# Patient Record
Sex: FEMALE | Race: WHITE | Employment: OTHER | URBAN - NONMETROPOLITAN AREA
[De-identification: names, ages, dates, MRNs, and addresses within clinical notes are randomized per-mention and may not be internally consistent; named-entity substitution may affect disease eponyms.]

---

## 2017-04-14 ENCOUNTER — HOSPITAL ENCOUNTER (OUTPATIENT)
Dept: CT IMAGING | Age: 77
Discharge: HOME | End: 2017-04-14
Payer: MEDICARE

## 2017-04-14 DIAGNOSIS — R10.2 PELVIC AND PERINEAL PAIN: ICD-10-CM

## 2017-04-14 LAB
GFR NON-AFRICAN AMERICAN: 48
PERFORMED ON: ABNORMAL
POC CREATININE: 1.1 MG/DL (ref 0.3–1.3)
POC SAMPLE TYPE: ABNORMAL

## 2017-04-14 PROCEDURE — 6360000004 HC RX CONTRAST MEDICATION: Performed by: FAMILY MEDICINE

## 2017-04-14 PROCEDURE — 74177 CT ABD & PELVIS W/CONTRAST: CPT

## 2017-04-14 PROCEDURE — 82565 ASSAY OF CREATININE: CPT

## 2017-04-14 RX ADMIN — IOVERSOL 75 ML: 741 INJECTION INTRA-ARTERIAL; INTRAVENOUS at 15:28

## 2017-05-02 ENCOUNTER — TRANSCRIBE ORDERS (OUTPATIENT)
Dept: ADMINISTRATIVE | Facility: HOSPITAL | Age: 77
End: 2017-05-02

## 2017-05-02 DIAGNOSIS — R19.09 ABDOMINAL MASS OF OTHER SITE: Primary | ICD-10-CM

## 2017-05-05 ENCOUNTER — APPOINTMENT (OUTPATIENT)
Dept: NUCLEAR MEDICINE | Facility: HOSPITAL | Age: 77
End: 2017-05-05
Attending: FAMILY MEDICINE

## 2017-05-11 ENCOUNTER — HOSPITAL ENCOUNTER (OUTPATIENT)
Dept: NUCLEAR MEDICINE | Facility: HOSPITAL | Age: 77
End: 2017-05-11
Attending: FAMILY MEDICINE

## 2017-05-11 ENCOUNTER — APPOINTMENT (OUTPATIENT)
Dept: NUCLEAR MEDICINE | Facility: HOSPITAL | Age: 77
End: 2017-05-11
Attending: FAMILY MEDICINE

## 2017-05-12 ENCOUNTER — TELEPHONE (OUTPATIENT)
Dept: CARDIOLOGY | Age: 77
End: 2017-05-12

## 2017-05-18 ENCOUNTER — HOSPITAL ENCOUNTER (OUTPATIENT)
Dept: NUCLEAR MEDICINE | Facility: HOSPITAL | Age: 77
Discharge: HOME | End: 2017-05-18
Attending: FAMILY MEDICINE

## 2017-05-18 DIAGNOSIS — R19.09 ABDOMINAL MASS OF OTHER SITE: ICD-10-CM

## 2017-05-18 PROCEDURE — A9561 TC99M OXIDRONATE: HCPCS | Performed by: FAMILY MEDICINE

## 2017-05-18 PROCEDURE — 0 TECHNETIUM OXIDRONATE KIT: Performed by: FAMILY MEDICINE

## 2017-05-18 PROCEDURE — 78306 BONE IMAGING WHOLE BODY: CPT

## 2017-05-18 RX ADMIN — TECHNETIUM TC 99M OXIDRONATE 1 DOSE: 3.15 INJECTION, POWDER, LYOPHILIZED, FOR SOLUTION INTRAVENOUS at 12:00

## 2017-06-23 ENCOUNTER — TELEPHONE (OUTPATIENT)
Dept: CARDIOLOGY | Age: 77
End: 2017-06-23

## 2017-07-12 ENCOUNTER — OFFICE VISIT (OUTPATIENT)
Dept: CARDIOLOGY | Age: 77
End: 2017-07-12
Payer: MEDICARE

## 2017-07-12 VITALS
BODY MASS INDEX: 24.4 KG/M2 | SYSTOLIC BLOOD PRESSURE: 110 MMHG | DIASTOLIC BLOOD PRESSURE: 70 MMHG | WEIGHT: 161 LBS | HEIGHT: 68 IN | HEART RATE: 103 BPM

## 2017-07-12 DIAGNOSIS — I51.89 DIASTOLIC DYSFUNCTION: ICD-10-CM

## 2017-07-12 DIAGNOSIS — Z95.0 PACEMAKER: ICD-10-CM

## 2017-07-12 DIAGNOSIS — I49.5 SICK SINUS SYNDROME (HCC): Primary | ICD-10-CM

## 2017-07-12 PROCEDURE — G8420 CALC BMI NORM PARAMETERS: HCPCS | Performed by: NURSE PRACTITIONER

## 2017-07-12 PROCEDURE — 1090F PRES/ABSN URINE INCON ASSESS: CPT | Performed by: NURSE PRACTITIONER

## 2017-07-12 PROCEDURE — G8427 DOCREV CUR MEDS BY ELIG CLIN: HCPCS | Performed by: NURSE PRACTITIONER

## 2017-07-12 PROCEDURE — 4040F PNEUMOC VAC/ADMIN/RCVD: CPT | Performed by: NURSE PRACTITIONER

## 2017-07-12 PROCEDURE — 1123F ACP DISCUSS/DSCN MKR DOCD: CPT | Performed by: NURSE PRACTITIONER

## 2017-07-12 PROCEDURE — G8400 PT W/DXA NO RESULTS DOC: HCPCS | Performed by: NURSE PRACTITIONER

## 2017-07-12 PROCEDURE — 99203 OFFICE O/P NEW LOW 30 MIN: CPT | Performed by: NURSE PRACTITIONER

## 2017-07-12 PROCEDURE — 93280 PM DEVICE PROGR EVAL DUAL: CPT | Performed by: NURSE PRACTITIONER

## 2017-07-12 PROCEDURE — 1036F TOBACCO NON-USER: CPT | Performed by: NURSE PRACTITIONER

## 2017-07-12 RX ORDER — FUROSEMIDE 40 MG/1
40 TABLET ORAL 2 TIMES DAILY
COMMUNITY
Start: 2017-07-04

## 2017-07-12 RX ORDER — CITALOPRAM 40 MG/1
40 TABLET ORAL DAILY
COMMUNITY
Start: 2017-06-17

## 2017-07-13 ENCOUNTER — TELEPHONE (OUTPATIENT)
Dept: CARDIOLOGY | Age: 77
End: 2017-07-13

## 2017-10-12 ENCOUNTER — TELEPHONE (OUTPATIENT)
Dept: CARDIOLOGY | Age: 77
End: 2017-10-12

## 2017-10-12 ENCOUNTER — HOSPITAL ENCOUNTER (OUTPATIENT)
Dept: WOUND CARE | Age: 77
Discharge: HOME | End: 2017-10-12
Payer: MEDICARE

## 2017-10-12 VITALS
HEART RATE: 79 BPM | DIASTOLIC BLOOD PRESSURE: 55 MMHG | TEMPERATURE: 98.2 F | BODY MASS INDEX: 23.19 KG/M2 | WEIGHT: 153 LBS | RESPIRATION RATE: 18 BRPM | HEIGHT: 68 IN | SYSTOLIC BLOOD PRESSURE: 103 MMHG

## 2017-10-12 DIAGNOSIS — R15.9 INCONTINENCE OF FECES, UNSPECIFIED FECAL INCONTINENCE TYPE: Chronic | ICD-10-CM

## 2017-10-12 DIAGNOSIS — L89.154 DECUBITUS ULCER OF SACRAL REGION, STAGE 4 (HCC): Chronic | ICD-10-CM

## 2017-10-12 DIAGNOSIS — E46 MALNUTRITION (HCC): Chronic | ICD-10-CM

## 2017-10-12 DIAGNOSIS — R53.81 DEBILITATED: Chronic | ICD-10-CM

## 2017-10-12 DIAGNOSIS — Z87.828 H/O BACK INJURY: Chronic | ICD-10-CM

## 2017-10-12 PROCEDURE — 11043 DBRDMT MUSC&/FSCA 1ST 20/<: CPT | Performed by: SURGERY

## 2017-10-12 PROCEDURE — 99214 OFFICE O/P EST MOD 30 MIN: CPT

## 2017-10-12 PROCEDURE — 11042 DBRDMT SUBQ TIS 1ST 20SQCM/<: CPT

## 2017-10-12 PROCEDURE — 99203 OFFICE O/P NEW LOW 30 MIN: CPT | Performed by: SURGERY

## 2017-10-12 NOTE — TELEPHONE ENCOUNTER
Talked to nurse at [facility]. Reminded her to send Carelink. She does have the monitor.
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
## 2017-10-12 NOTE — PLAN OF CARE
Problem: Pain:  Goal: Pain level will decrease  Pain level will decrease   Outcome: Ongoing    Goal: Control of acute pain  Control of acute pain   Outcome: Ongoing    Goal: Control of chronic pain  Control of chronic pain   Outcome: Ongoing      Problem: Wound:  Goal: Will show signs of wound healing; wound closure and no evidence of infection  Will show signs of wound healing; wound closure and no evidence of infection  Outcome: Ongoing      Problem: Pressure Ulcer:  Goal: Signs of wound healing will improve  Signs of wound healing will improve  Outcome: Ongoing    Goal: Absence of new pressure ulcer  Absence of new pressure ulcer  Outcome: Ongoing    Goal: Will show no infection signs and symptoms  Will show no infection signs and symptoms  Outcome: Ongoing

## 2017-10-23 ENCOUNTER — HOSPITAL ENCOUNTER (EMERGENCY)
Age: 77
Discharge: HOME | End: 2017-10-23
Attending: FAMILY MEDICINE
Payer: MEDICARE

## 2017-10-23 VITALS
DIASTOLIC BLOOD PRESSURE: 75 MMHG | TEMPERATURE: 98.5 F | HEIGHT: 68 IN | BODY MASS INDEX: 22.73 KG/M2 | WEIGHT: 150 LBS | OXYGEN SATURATION: 97 % | RESPIRATION RATE: 20 BRPM | HEART RATE: 84 BPM | SYSTOLIC BLOOD PRESSURE: 143 MMHG

## 2017-10-23 DIAGNOSIS — D72.829 LEUKOCYTOSIS, UNSPECIFIED TYPE: ICD-10-CM

## 2017-10-23 DIAGNOSIS — A49.9 UTI (URINARY TRACT INFECTION), BACTERIAL: Primary | ICD-10-CM

## 2017-10-23 DIAGNOSIS — N39.0 UTI (URINARY TRACT INFECTION), BACTERIAL: Primary | ICD-10-CM

## 2017-10-23 LAB
ALBUMIN SERPL-MCNC: 2 G/DL (ref 3.5–5.2)
ALP BLD-CCNC: 140 U/L (ref 35–104)
ALT SERPL-CCNC: 9 U/L (ref 5–33)
ANION GAP SERPL CALCULATED.3IONS-SCNC: 9 MMOL/L (ref 7–19)
AST SERPL-CCNC: 18 U/L (ref 5–32)
BACTERIA: ABNORMAL /HPF
BASOPHILS ABSOLUTE: 0.1 K/UL (ref 0–0.2)
BASOPHILS RELATIVE PERCENT: 0.4 % (ref 0–1)
BILIRUB SERPL-MCNC: 0.3 MG/DL (ref 0.2–1.2)
BILIRUBIN URINE: NEGATIVE
BLOOD, URINE: NEGATIVE
BUN BLDV-MCNC: 9 MG/DL (ref 8–23)
CALCIUM SERPL-MCNC: 8.3 MG/DL (ref 8.8–10.2)
CHLORIDE BLD-SCNC: 87 MMOL/L (ref 98–111)
CLARITY: ABNORMAL
CO2: 36 MMOL/L (ref 22–29)
COLOR: YELLOW
CREAT SERPL-MCNC: 0.4 MG/DL (ref 0.5–0.9)
EOSINOPHILS ABSOLUTE: 0 K/UL (ref 0–0.6)
EOSINOPHILS RELATIVE PERCENT: 0.1 % (ref 0–5)
EPITHELIAL CELLS, UA: 1 /HPF (ref 0–5)
GFR NON-AFRICAN AMERICAN: >60
GLUCOSE BLD-MCNC: 91 MG/DL (ref 74–109)
GLUCOSE URINE: NEGATIVE MG/DL
HCT VFR BLD CALC: 39.1 % (ref 37–47)
HEMOGLOBIN: 12.6 G/DL (ref 12–16)
HYALINE CASTS: 4 /HPF (ref 0–8)
KETONES, URINE: NEGATIVE MG/DL
LEUKOCYTE ESTERASE, URINE: ABNORMAL
LYMPHOCYTES ABSOLUTE: 1.6 K/UL (ref 1.1–4.5)
LYMPHOCYTES RELATIVE PERCENT: 9.6 % (ref 20–40)
MCH RBC QN AUTO: 31.7 PG (ref 27–31)
MCHC RBC AUTO-ENTMCNC: 32.2 G/DL (ref 33–37)
MCV RBC AUTO: 98.2 FL (ref 81–99)
MONOCYTES ABSOLUTE: 1.2 K/UL (ref 0–0.9)
MONOCYTES RELATIVE PERCENT: 7.1 % (ref 0–10)
NEUTROPHILS ABSOLUTE: 12.7 K/UL (ref 1.5–7.5)
NEUTROPHILS RELATIVE PERCENT: 78.3 % (ref 50–65)
NITRITE, URINE: POSITIVE
PDW BLD-RTO: 13 % (ref 11.5–14.5)
PH UA: 6
PLATELET # BLD: 281 K/UL (ref 130–400)
PMV BLD AUTO: 10.1 FL (ref 9.4–12.3)
POTASSIUM SERPL-SCNC: 3.7 MMOL/L (ref 3.5–5)
PROTEIN UA: NEGATIVE MG/DL
RBC # BLD: 3.98 M/UL (ref 4.2–5.4)
RBC UA: 1 /HPF (ref 0–4)
SODIUM BLD-SCNC: 132 MMOL/L (ref 136–145)
SPECIFIC GRAVITY UA: 1.01
TOTAL PROTEIN: 6.1 G/DL (ref 6.6–8.7)
UROBILINOGEN, URINE: 1 E.U./DL
WBC # BLD: 16.3 K/UL (ref 4.8–10.8)
WBC UA: 60 /HPF (ref 0–5)

## 2017-10-23 PROCEDURE — 85025 COMPLETE CBC W/AUTO DIFF WBC: CPT

## 2017-10-23 PROCEDURE — 96365 THER/PROPH/DIAG IV INF INIT: CPT

## 2017-10-23 PROCEDURE — 6370000000 HC RX 637 (ALT 250 FOR IP): Performed by: FAMILY MEDICINE

## 2017-10-23 PROCEDURE — 86403 PARTICLE AGGLUT ANTBDY SCRN: CPT

## 2017-10-23 PROCEDURE — 87086 URINE CULTURE/COLONY COUNT: CPT

## 2017-10-23 PROCEDURE — 36415 COLL VENOUS BLD VENIPUNCTURE: CPT

## 2017-10-23 PROCEDURE — 87075 CULTR BACTERIA EXCEPT BLOOD: CPT

## 2017-10-23 PROCEDURE — 2580000003 HC RX 258: Performed by: FAMILY MEDICINE

## 2017-10-23 PROCEDURE — 99284 EMERGENCY DEPT VISIT MOD MDM: CPT | Performed by: FAMILY MEDICINE

## 2017-10-23 PROCEDURE — 99283 EMERGENCY DEPT VISIT LOW MDM: CPT

## 2017-10-23 PROCEDURE — 87070 CULTURE OTHR SPECIMN AEROBIC: CPT

## 2017-10-23 PROCEDURE — 80053 COMPREHEN METABOLIC PANEL: CPT

## 2017-10-23 PROCEDURE — 2500000003 HC RX 250 WO HCPCS: Performed by: FAMILY MEDICINE

## 2017-10-23 PROCEDURE — 87040 BLOOD CULTURE FOR BACTERIA: CPT

## 2017-10-23 PROCEDURE — 81001 URINALYSIS AUTO W/SCOPE: CPT

## 2017-10-23 PROCEDURE — 87185 SC STD ENZYME DETCJ PER NZM: CPT

## 2017-10-23 PROCEDURE — 87077 CULTURE AEROBIC IDENTIFY: CPT

## 2017-10-23 RX ORDER — BENZONATATE 100 MG/1
100 CAPSULE ORAL 3 TIMES DAILY PRN
COMMUNITY
End: 2018-03-06 | Stop reason: ALTCHOICE

## 2017-10-23 RX ORDER — POTASSIUM CHLORIDE 1.5 G/1.77G
20 POWDER, FOR SOLUTION ORAL DAILY
COMMUNITY
End: 2018-09-25

## 2017-10-23 RX ORDER — MAGNESIUM CARB/ALUMINUM HYDROX 105-160MG
296 TABLET,CHEWABLE ORAL ONCE
COMMUNITY
End: 2018-02-22 | Stop reason: ALTCHOICE

## 2017-10-23 RX ORDER — NYSTATIN 100000 U/G
CREAM TOPICAL 2 TIMES DAILY
COMMUNITY
End: 2017-10-26 | Stop reason: ALTCHOICE

## 2017-10-23 RX ORDER — TRAMADOL HYDROCHLORIDE 50 MG/1
50 TABLET ORAL ONCE
Status: COMPLETED | OUTPATIENT
Start: 2017-10-23 | End: 2017-10-23

## 2017-10-23 RX ORDER — DOXYCYCLINE HYCLATE 100 MG
100 TABLET ORAL 2 TIMES DAILY
Qty: 14 TABLET | Refills: 0 | Status: SHIPPED | OUTPATIENT
Start: 2017-10-23 | End: 2017-10-26 | Stop reason: ALTCHOICE

## 2017-10-23 RX ORDER — CLOTRIMAZOLE AND BETAMETHASONE DIPROPIONATE 10; .64 MG/G; MG/G
CREAM TOPICAL 2 TIMES DAILY
COMMUNITY
End: 2017-10-26 | Stop reason: ALTCHOICE

## 2017-10-23 RX ADMIN — DOXYCYCLINE 100 MG: 100 INJECTION, POWDER, LYOPHILIZED, FOR SOLUTION INTRAVENOUS at 21:02

## 2017-10-23 RX ADMIN — TRAMADOL HYDROCHLORIDE 50 MG: 50 TABLET, FILM COATED ORAL at 22:08

## 2017-10-23 ASSESSMENT — ENCOUNTER SYMPTOMS
ABDOMINAL PAIN: 0
VOMITING: 0
SINUS PAIN: 0
BACK PAIN: 0
CHEST TIGHTNESS: 0
NAUSEA: 0
ABDOMINAL DISTENTION: 0
COUGH: 0
BLOOD IN STOOL: 0
SHORTNESS OF BREATH: 0
COLOR CHANGE: 0
RHINORRHEA: 0
CONSTIPATION: 0
WHEEZING: 0
PHOTOPHOBIA: 0
DIARRHEA: 0

## 2017-10-23 ASSESSMENT — PAIN SCALES - GENERAL
PAINLEVEL_OUTOF10: 3
PAINLEVEL_OUTOF10: 3

## 2017-10-23 NOTE — ED PROVIDER NOTES
as needed for Itching    DOCUSATE SODIUM (COLACE) 100 MG CAPSULE    Take 100 mg by mouth daily    FEXOFENADINE HCL (MUCINEX ALLERGY PO)    Take by mouth    FUROSEMIDE (LASIX) 20 MG TABLET    20 mg 2 times daily     IPRATROPIUM (ATROVENT) 0.03 % NASAL SPRAY    2 sprays by Nasal route every 12 hours    LOPERAMIDE (IMODIUM) 2 MG CAPSULE    Take 2 mg by mouth 4 times daily as needed for Diarrhea    LORATADINE (CLARITIN) 10 MG TABLET    Take 10 mg by mouth daily    LORAZEPAM (ATIVAN) 0.5 MG TABLET    Take 0.25 mg by mouth 2 times daily    MAGNESIUM CITRATE (CITROMA) 1.745 GM/30ML SOLUTION    Take 296 mLs by mouth once    MAGNESIUM HYDROXIDE (MILK OF MAGNESIA PO)    Take by mouth    MELOXICAM (MOBIC) 7.5 MG TABLET    Take 7.5 mg by mouth daily    MENTHOL-ZINC OXIDE (CALMOSEPTINE EX)    Apply topically    MENTHOL-ZINC OXIDE (CALMOSEPTINE EX)    Apply topically    NYSTATIN (MYCOSTATIN) 106763 UNIT/GM CREAM    Apply topically 2 times daily Apply topically 2 times daily. PANTOPRAZOLE SODIUM (PROTONIX) 40 MG PACK PACKET    Take 40 mg by mouth every morning (before breakfast)    POLYETHYLENE GLYCOL (GLYCOLAX) POWDER    Take 17 g by mouth daily    POTASSIUM CHLORIDE (KLOR-CON) 20 MEQ PACKET    Take 20 mEq by mouth daily    QUETIAPINE (SEROQUEL) 100 MG TABLET    Take 50 mg by mouth daily    ROPINIROLE (REQUIP) 0.25 MG TABLET    Take 0.25 mg by mouth daily    SKIN PROTECTANTS, MISC. (MINERIN) CREA    Apply topically    TRAMADOL (ULTRAM) 50 MG TABLET    Take 50 mg by mouth daily    TRAMADOL HCL  MG CP24    Take by mouth    TRAZODONE (DESYREL) 150 MG TABLET    Take 150 mg by mouth nightly    VALACYCLOVIR (VALTREX) 500 MG TABLET    Take 500 mg by mouth 2 times daily       ALLERGIES     Ciprocinonide [fluocinolone]; Codeine phosphate [codeine];  Morphine sulfate [morphine]; and Quinolones    FAMILY HISTORY       Family History   Problem Relation Age of Onset    Heart Disease Mother     Heart Disease Father           SOCIAL MICROSCOPIC URINALYSIS - Abnormal; Notable for the following:     WBC, UA 60 (*)     All other components within normal limits   CULTURE BLOOD #1   CULTURE BLOOD #2   TIP CULTURE   URINE CULTURE         All other labs were within normal range or not returned as of this dictation. EMERGENCY DEPARTMENT COURSE and DIFFERENTIAL DIAGNOSIS/MDM:   Vitals:    Vitals:    10/23/17 1902 10/23/17 1933 10/23/17 2003 10/23/17 2033   BP: 119/67 134/72 (!) 109/58 122/80   Pulse: 72 69 75 81   Resp: 20 18 20 20   Temp:       TempSrc:       SpO2: 93% 95% 94% 95%   Weight:       Height:           MDM  Number of Diagnoses or Management Options  Leukocytosis, unspecified type: new and requires workup  UTI (urinary tract infection), bacterial: new and requires workup     Amount and/or Complexity of Data Reviewed  Clinical lab tests: ordered and reviewed  Decide to obtain previous medical records or to obtain history from someone other than the patient: yes  Review and summarize past medical records: yes  Independent visualization of images, tracings, or specimens: yes    Risk of Complications, Morbidity, and/or Mortality  Presenting problems: moderate  Diagnostic procedures: moderate  Management options: moderate    Patient Progress  Patient progress: improved      Reassessment      CONSULTS:  None    PROCEDURES:  Unless otherwise noted below, none     Procedures    FINAL IMPRESSION      1. UTI (urinary tract infection), bacterial    2.  Leukocytosis, unspecified type          DISPOSITION/PLAN   DISPOSITION Decision to Discharge    PATIENT REFERRED TO:  MD Brit [address]ina [address] [id number]  [phone]    In 2 days  As needed      DISCHARGE MEDICATIONS:  New Prescriptions    DOXYCYCLINE HYCLATE (VIBRA-TABS) 100 MG TABLET    Take 1 tablet by mouth 2 times daily for 7 days          (Please note that portions of this note were completed with a voice recognition program.  Efforts were made to edit the dictations but occasionally words are mis-transcribed.)    Raeann Lucas MD (electronically signed)  Attending Emergency Physician       Wilmer Bartlett MD  10/23/17 2619       Wilmer Bartlett MD  10/23/17 6124

## 2017-10-24 NOTE — ED NOTES
Urine specimen pulled from pt existing obrien catheter.  Urine pulled from port after cleaning it with alcohol     Annemarie Luong RN  10/23/17 1583

## 2017-10-24 NOTE — ED NOTES
Pt with large incontinent bowel movement. Pt cleaned up, wound dressing from coccyx changed. Pt put back on back, pt with another large bowel movement.  Pt cleaned up     Maribell Daniel RN  10/23/17 2056

## 2017-10-26 ENCOUNTER — HOSPITAL ENCOUNTER (OUTPATIENT)
Dept: WOUND CARE | Age: 77
Discharge: HOME | End: 2017-10-26
Payer: MEDICARE

## 2017-10-26 VITALS
BODY MASS INDEX: 22.66 KG/M2 | DIASTOLIC BLOOD PRESSURE: 73 MMHG | HEART RATE: 112 BPM | RESPIRATION RATE: 22 BRPM | WEIGHT: 149.06 LBS | SYSTOLIC BLOOD PRESSURE: 122 MMHG | TEMPERATURE: 98.6 F

## 2017-10-26 LAB
ORGANISM: ABNORMAL
URINE CULTURE, ROUTINE: ABNORMAL

## 2017-10-26 PROCEDURE — 11043 DBRDMT MUSC&/FSCA 1ST 20/<: CPT

## 2017-10-26 PROCEDURE — 11043 DBRDMT MUSC&/FSCA 1ST 20/<: CPT | Performed by: SURGERY

## 2017-10-26 RX ORDER — SULFAMETHOXAZOLE AND TRIMETHOPRIM 800; 160 MG/1; MG/1
1 TABLET ORAL 2 TIMES DAILY
Qty: 10 TABLET | Refills: 0 | Status: SHIPPED | OUTPATIENT
Start: 2017-10-26 | End: 2017-11-05

## 2017-10-26 RX ORDER — GUAIFENESIN 600 MG/1
600 TABLET, EXTENDED RELEASE ORAL 2 TIMES DAILY
COMMUNITY
End: 2019-05-07 | Stop reason: ALTCHOICE

## 2017-10-26 RX ORDER — M-VIT,TX,IRON,MINS/CALC/FOLIC 27MG-0.4MG
1 TABLET ORAL DAILY
COMMUNITY

## 2017-10-26 ASSESSMENT — PAIN DESCRIPTION - DESCRIPTORS: DESCRIPTORS: ACHING;TIGHTNESS

## 2017-10-26 ASSESSMENT — PAIN DESCRIPTION - PROGRESSION: CLINICAL_PROGRESSION: NOT CHANGED

## 2017-10-26 ASSESSMENT — PAIN SCALES - GENERAL: PAINLEVEL_OUTOF10: 2

## 2017-10-26 ASSESSMENT — PAIN DESCRIPTION - LOCATION: LOCATION: LEG;OTHER (COMMENT)

## 2017-10-26 ASSESSMENT — PAIN DESCRIPTION - FREQUENCY: FREQUENCY: INTERMITTENT

## 2017-10-26 ASSESSMENT — PAIN DESCRIPTION - ONSET: ONSET: ON-GOING

## 2017-10-26 ASSESSMENT — PAIN DESCRIPTION - PAIN TYPE: TYPE: CHRONIC PAIN

## 2017-10-27 LAB
ANAEROBIC CULTURE: ABNORMAL
CULTURE CATHETER TIP: ABNORMAL
CULTURE CATHETER TIP: ABNORMAL
ORGANISM: ABNORMAL

## 2017-10-28 LAB
BLOOD CULTURE, ROUTINE: NORMAL
CULTURE, BLOOD 2: NORMAL

## 2017-10-31 ENCOUNTER — HOSPITAL ENCOUNTER (OUTPATIENT)
Dept: WOUND CARE | Age: 77
Discharge: HOME | End: 2017-10-31
Payer: OTHER MISCELLANEOUS

## 2017-10-31 VITALS — RESPIRATION RATE: 20 BRPM | WEIGHT: 149 LBS | TEMPERATURE: 97.7 F | HEIGHT: 68 IN | BODY MASS INDEX: 22.58 KG/M2

## 2017-10-31 PROCEDURE — 11043 DBRDMT MUSC&/FSCA 1ST 20/<: CPT

## 2017-10-31 PROCEDURE — 11043 DBRDMT MUSC&/FSCA 1ST 20/<: CPT | Performed by: SURGERY

## 2017-10-31 PROCEDURE — 11042 DBRDMT SUBQ TIS 1ST 20SQCM/<: CPT

## 2017-10-31 ASSESSMENT — PAIN SCALES - GENERAL: PAINLEVEL_OUTOF10: 3

## 2017-10-31 ASSESSMENT — PAIN DESCRIPTION - FREQUENCY: FREQUENCY: INTERMITTENT

## 2017-10-31 ASSESSMENT — PAIN DESCRIPTION - PROGRESSION: CLINICAL_PROGRESSION: NOT CHANGED

## 2017-10-31 ASSESSMENT — PAIN DESCRIPTION - PAIN TYPE: TYPE: CHRONIC PAIN

## 2017-10-31 ASSESSMENT — PAIN DESCRIPTION - LOCATION: LOCATION: BUTTOCKS

## 2017-10-31 ASSESSMENT — PAIN DESCRIPTION - ORIENTATION: ORIENTATION: RIGHT

## 2017-10-31 ASSESSMENT — PAIN DESCRIPTION - ONSET: ONSET: ON-GOING

## 2017-10-31 ASSESSMENT — PAIN DESCRIPTION - DESCRIPTORS: DESCRIPTORS: ACHING

## 2017-10-31 NOTE — PROGRESS NOTES
Wound Care Center   Progress Note and Procedure Note      Cristina Carrillo  AGE: 65 y.o. GENDER: female  : 1940  TODAY'S DATE:  10/31/2017    Subjective:        HISTORY of PRESENT ILLNESS HPI   Cristina Carrillo is a 65 y.o. female who presents today for wound evaluation. Wound Type:pressure  Wound Location:sacral  Modifying factors:chronic pressure, decreased mobility, shear force, malnutrition and incontinence of stool    Patient Active Problem List   Diagnosis Code    Sick sinus syndrome ([facility]) I49.5    Pacemaker W45.1    Diastolic dysfunction C64.8    Decubitus ulcer of sacral region, stage 4 ([facility]) L89.154    Debilitated R53.81    Malnutrition ([facility]) E46    H/O back injury Z87.828    Incontinence of feces R15.9    Severe protein-calorie malnutrition ([facility]) E43       ALLERGIES    Allergies   Allergen Reactions    Ciprocinonide [Fluocinolone]     Codeine Phosphate [Codeine]     Morphine Sulfate [Morphine]     Quinolones            Objective:      Temp 97.7 °F (36.5 °C) (Temporal)   Resp 20   Ht 5' 8\" (1.727 m)   Wt 149 lb (67.6 kg)   BMI 22.66 kg/m²     Post Debridement Measurements and Assessment:  Pressure Ulcer 10/12/17 Coccyx Mid Wound 1. Stage 4, Coccyx (Active)   Sabine-wound Assessment Excoriated 10/31/2017  2:13 PM   Sabine-Wound Texture Excoriation 10/31/2017  2:13 PM   Sabine-Wound Moisture Dark edges 10/31/2017  2:13 PM   Sabine-Wound Color Red 10/31/2017  2:13 PM   Pressure Ulcer Staging Stage IV 10/31/2017  2:13 PM   Non-staged Wound Description Not applicable   8:44 PM   Wound Assessment Black; Yellow;Slough;Pink 10/31/2017  2:13 PM   Shape irregular 10/12/2017  3:24 PM   Wound Length (cm) 6 cm 10/31/2017  3:06 PM   Wound Width (cm) 5 cm 10/31/2017  3:06 PM   Wound Depth (cm)  1.9 10/31/2017  3:06 PM   Calculated Wound Size (cm^2) (l*w) 30 cm^2 10/31/2017  3:06 PM   Change in Wound Size % (l*w) -66.67 10/31/2017  3:06 PM   Closure None 10/31/2017  2:13 PM   Dressing Status Old Debrided:  fibrin, biofilm, slough and necrotic/eschar    Percent of Wound Debrided: 50%- 50% debrided to muscle, 50% debrided as open wound      Bleeding: Estimated amount of blood loss is 15 ml. Hemostasis:   by pressure      Response to treatment:  Well tolerated by patient. Assessment:      Patient Active Problem List   Diagnosis    [facility]    Pacemaker    Diastolic dysfunction    Decubitus ulcer of sacral region, stage 4 ([facility]    Debilitated    Malnutrition ([facility]    H/O back injury    Incontinence of feces    Severe protein-calorie malnutrition ([facility]          Plan:          Plan for wound - Dress per physician order  Treatment:     Compression : No   Offloading : Yes   Dressing : see AVS   Additional Therapy : none     1. Discussed appropriate home care of this wound. Wound redressed. 2. Patient instructions were given. 3. Follow up: 2 week(s). Ms. Arsenio Casillas had more necrotic tissue in the base of her wound and the appearance of continued pressure. I discussed with her that my directions wound be to be up just long enough and then back to bed, no other sitting in her chair, and limited to 30-45 minutes at a time. Discharge Instructions       Visit Discharge/Physician Orders    Discharge condition: Stable     Discharge to: ECF     Left via:Private automobile     Accompanied by:  granddaughter     ECF/HHA: [address]     Dressing Orders: Coccyx:   Wash with soap and water. Apply santyl ointment to wound bed. Cover with normal saline moistened gauze to wound bed. Cover with ABD. Secure with medipore tape or hytape. Change twice daily .     Apply Tinactin to both feet twice daily.      Treatment Orders:   Avoid Pressure to wound site.   Turn frequently (turn at least every two hours when in bed)  While in chair reposition every 30 minutes  Patient advised to sit up less than 1 hours at a time.     Off-load heels by applying heel-lift boots or \"float\" heels by placing pillows under calves so that heels do not touch mattress.     Continue Protein rich diet (unless restricted by your physician). Please restart Protein supplements. Take Multivitamin daily     Please use high profile Roho cushion in chair  Please use low air loss bed          [facility] followup visit ______2 weeks_______________________  (Please note your next appointment above and if you are unable to keep, kindly give a 24 hour notice. Thank you.)        If you experience any of the following, please call the [facility] during business hours:    * Increase in Pain  * Temperature over 101  * Increase in drainage from your wound  * Drainage with a foul odor  * Bleeding  * Increase in swelling  * Need for compression bandage changes due to slippage, breakthrough drainage. If you need medical attention outside of the business hours of the [facility] please contact your PCP or go to the nearest emergency room.              Electronically signed by Velma Ye MD on 10/31/2017 at 3:15 PM

## 2017-10-31 NOTE — PLAN OF CARE
Problem: Wound:  Goal: Will show signs of wound healing; wound closure and no evidence of infection  Will show signs of wound healing; wound closure and no evidence of infection   Outcome: Ongoing      Problem: Pressure Ulcer:  Goal: Signs of wound healing will improve  Signs of wound healing will improve   Outcome: Ongoing    Goal: Absence of new pressure ulcer  Absence of new pressure ulcer   Outcome: Ongoing    Goal: Will show no infection signs and symptoms  Will show no infection signs and symptoms   Outcome: Ongoing

## 2017-11-03 ENCOUNTER — TELEPHONE (OUTPATIENT)
Dept: CARDIOLOGY | Age: 77
End: 2017-11-03

## 2017-11-03 NOTE — TELEPHONE ENCOUNTER
Krys Scanlon of [address] in [facility] called and they have tried 3 times to get carelink to work. It will not send out. Ordered a new carelink monitor and wireless adapter.

## 2017-11-17 DIAGNOSIS — I49.5 SA NODE DYSFUNCTION (HCC): ICD-10-CM

## 2017-11-17 DIAGNOSIS — Z95.0 PACEMAKER: Primary | ICD-10-CM

## 2017-11-17 PROCEDURE — 93294 REM INTERROG EVL PM/LDLS PM: CPT | Performed by: INTERNAL MEDICINE

## 2017-11-17 PROCEDURE — 93296 REM INTERROG EVL PM/IDS: CPT | Performed by: INTERNAL MEDICINE

## 2017-11-21 ENCOUNTER — HOSPITAL ENCOUNTER (OUTPATIENT)
Dept: WOUND CARE | Age: 77
Discharge: HOME | End: 2017-11-21
Payer: MEDICARE

## 2017-11-21 VITALS
RESPIRATION RATE: 16 BRPM | SYSTOLIC BLOOD PRESSURE: 106 MMHG | DIASTOLIC BLOOD PRESSURE: 67 MMHG | WEIGHT: 142.86 LBS | BODY MASS INDEX: 21.65 KG/M2 | TEMPERATURE: 98.2 F | HEART RATE: 87 BPM | HEIGHT: 68 IN

## 2017-11-21 PROCEDURE — 11042 DBRDMT SUBQ TIS 1ST 20SQCM/<: CPT | Performed by: SURGERY

## 2017-11-21 PROCEDURE — 11042 DBRDMT SUBQ TIS 1ST 20SQCM/<: CPT

## 2017-11-21 RX ORDER — COLLAGENASE SANTYL 250 [ARB'U]/G
OINTMENT TOPICAL 2 TIMES DAILY
COMMUNITY
Start: 2017-11-16 | End: 2018-10-16 | Stop reason: ALTCHOICE

## 2017-11-21 ASSESSMENT — PAIN DESCRIPTION - ONSET: ONSET: ON-GOING

## 2017-11-21 ASSESSMENT — PAIN DESCRIPTION - PAIN TYPE: TYPE: CHRONIC PAIN

## 2017-11-21 ASSESSMENT — PAIN DESCRIPTION - FREQUENCY: FREQUENCY: INTERMITTENT

## 2017-11-21 ASSESSMENT — PAIN SCALES - GENERAL: PAINLEVEL_OUTOF10: 2

## 2017-11-21 ASSESSMENT — PAIN DESCRIPTION - LOCATION: LOCATION: HIP

## 2017-11-21 ASSESSMENT — PAIN DESCRIPTION - PROGRESSION: CLINICAL_PROGRESSION: NOT CHANGED

## 2017-11-21 ASSESSMENT — PAIN DESCRIPTION - DESCRIPTORS: DESCRIPTORS: DULL

## 2017-11-21 NOTE — PROGRESS NOTES
Wound Care Center   Progress Note and Procedure Note      Kianna Hollingsworth  AGE: 65 y.o. GENDER: female  : 1940  TODAY'S DATE:  2017    Subjective:        HISTORY of PRESENT ILLNESS HPI   Kianna Hollingsworth is a 65 y.o. female who presents today for wound evaluation. Wound Type:pressure  Wound Location:sacral  Modifying factors:chronic pressure, decreased mobility, shear force and incontinence of stool    Patient Active Problem List   Diagnosis Code    SA node dysfunction ([facility]) I49.5    Pacemaker L64.0    Diastolic dysfunction X47.0    Decubitus ulcer of sacral region, stage 4 ([facility]) L89.154    Debilitated R53.81    Malnutrition ([facility]) E46    H/O back injury Z87.828    Incontinence of feces R15.9    Severe protein-calorie malnutrition ([facility]) E43       ALLERGIES    Allergies   Allergen Reactions    Morphine Sulfate [Morphine] Anaphylaxis     Pt states she quit breathing      Ciprocinonide [Fluocinolone]     Codeine Phosphate [Codeine] Nausea Only    Quinolones            Objective:      /67   Pulse 87   Temp 98.2 °F (36.8 °C) (Temporal)   Resp 16   Ht 5' 8\" (1.727 m)   Wt 142 lb 13.7 oz (64.8 kg)   BMI 21.72 kg/m²     Post Debridement Measurements and Assessment:  Pressure Ulcer 10/12/17 Coccyx Mid Wound 1. Stage 4, Coccyx (Active)   Sabine-wound Assessment Purple;Red 2017 11:06 AM   Sabine-Wound Texture Localized edema 2017 11:06 AM   Sabine-Wound Moisture Dry 2017 11:06 AM   Sabine-Wound Color Purple;Red 2017 11:06 AM   Pressure Ulcer Staging Stage IV 2017 11:06 AM   Non-staged Wound Description Not applicable  91:15 AM   Wound Assessment Drainage;Red;Pink;Slough; Yellow;Black 2017 11:06 AM   Shape irregular 10/12/2017  3:24 PM   Wound Length (cm) 7.5 cm 2017 11:26 AM   Wound Width (cm) 4.5 cm 2017 11:26 AM   Wound Depth (cm)  1.9 2017 11:26 AM   Calculated Wound Size (cm^2) (l*w) 33.75 cm^2 2017 11:26 AM   Change in Wound Size % (l*w) -87.5 11/21/2017 11:26 AM   Closure None 11/21/2017 11:06 AM   Dressing Status Old drainage 11/21/2017 11:06 AM   Dressing Changed Changed/New 10/12/2017  4:45 PM   Dressing/Treatment Moist to dry;ABD; Medipore 10/26/2017  3:26 PM   Wound Cleansed Other (Comment) 11/21/2017 11:06 AM   Necrotic Type Black Eschar 11/21/2017 11:06 AM   Necrotic Amount Medium: 34-66% 11/21/2017 11:06 AM   Granulation Quality Red 11/21/2017 11:06 AM   Drainage Amount Moderate 11/21/2017 11:06 AM   Drainage Description Serosanguinous 11/21/2017 11:06 AM   Odor None 11/21/2017 11:06 AM   Epithelialization None present 11/21/2017 11:06 AM   Distance Tunneling (cm) 0 cm 11/21/2017 11:06 AM   Tunneling Position ___ O'Clock 0 11/21/2017 11:06 AM   Tunneling Maxium Distance (cm) 0 11/21/2017 11:06 AM   Undermining Starts ___ O'Clock 8 11/21/2017 11:06 AM   Undermining Ends___ O'Clock 5 11/21/2017 11:06 AM   Undermining Maxium Distance (cm) 4.8 11/21/2017 11:06 AM   [place]%Wound Bed 20 11/21/2017 11:06 AM   Red%Wound Bed 40 11/21/2017 11:06 AM   Yellow%Wound Bed 20 11/21/2017 11:06 AM   Black%Wound Bed 20 11/21/2017 11:06 AM   Purple%Wound Bed 0 11/21/2017 11:06 AM   Margins Unattached edges 11/21/2017 11:06 AM   Exposed structure Bone 11/21/2017 11:06 AM   Debridement per physician Muscle 11/21/2017 11:26 AM   Time out Yes 11/21/2017 11:26 AM   Procedural Pain 0 11/21/2017 11:26 AM   Post procedural Pain 0 11/21/2017 11:26 AM   Number of days: 39      The patients pain isPain Level: 2 Pain Type: Chronic pain. Wound is has slightly improved. The appearance of the wound is much improved, even though the measurements are not improved. Please refer to nursing measurements and assessment regarding wound pre and post debridement.     Procedure Note:    Wound #: 1     Debridement: Excisional Debridement    Anesthetic: Anesthetic: 2% Lidocaine Gel Topical  Using scissors and forceps the wound was sharply debrided    down through and including the removal of epidermis, dermis and subcutaneous tissue. Devitalized Tissue Debrided:  fibrin, biofilm, slough and necrotic/eschar    Percent of Wound Debrided: 10%      Bleeding: Minimal    Hemostasis:   by pressure      Response to treatment:  Well tolerated by patient. Assessment:      Patient Active Problem List   Diagnosis    [facility] [address]    Pacemaker    Diastolic dysfunction    Decubitus ulcer of sacral region, stage 4 ([facility] [address]    Debilitated    Malnutrition [facility] [address]    H/O back injury    Incontinence of feces    Severe protein-calorie malnutrition ([facility] [address]          Plan:          Plan for wound - Dress per physician order  Treatment:     Compression : No   Offloading : Yes   Dressing : see AVS   Additional Therapy : none     1. Discussed appropriate home care of this wound. Wound redressed. 2. Patient instructions were given. 3. Follow up: 2 week(s). Ms. Nury Fournier wound is looking much more clean, there was only a small area of necrotic tissue today. Continue with spending most time in the bed on the specialty mattress, only up to eat. Encouraged her to try and eat more, and take the protein supplement every time. Follow up in two weeks. Discharge Instructions       Visit Discharge/Physician Orders    Discharge condition: Stable     Discharge to: ECF     Left via:Private automobile     Accompanied by:  granddaughter     ECF/[facility]HA: [address]     Dressing Orders: Coccyx:   Wash with soap and water. Apply santyl ointment to wound bed. Cover with normal saline moistened gauze to wound bed. Cover with ABD. Secure with medipore tape or hytape. Change twice daily .     Apply Tinactin to both feet twice daily.      Treatment Orders:   Avoid Pressure to wound site.   Turn frequently (turn at least every two hours when in bed)  While in chair reposition every 30 minutes  Patient advised to sit up less than 1 hours at a time.     Off-load heels by applying heel-lift boots or \"float\" heels by placing pillows under calves so that heels do not touch mattress.     Continue Protein rich diet (unless restricted by your physician). Please restart Protein supplements. Take Multivitamin daily     Please use high profile Roho cushion in chair  Please use low air loss bed          [facility] followup visit ______2 weeks_______________________  (Please note your next appointment above and if you are unable to keep, kindly give a 24 hour notice. Thank you.)      If you experience any of the following, please call the [facility] during business hours:    * Increase in Pain  * Temperature over 101  * Increase in drainage from your wound  * Drainage with a foul odor  * Bleeding  * Increase in swelling  * Need for compression bandage changes due to slippage, breakthrough drainage. If you need medical attention outside of the business hours of the [facility] please contact your PCP or go to the nearest emergency room.              Electronically signed by Mart Coon MD on 11/21/2017 at 11:33 AM

## 2017-12-12 ENCOUNTER — HOSPITAL ENCOUNTER (OUTPATIENT)
Dept: WOUND CARE | Age: 77
Discharge: HOME | End: 2017-12-12
Payer: MEDICARE

## 2017-12-19 ENCOUNTER — HOSPITAL ENCOUNTER (OUTPATIENT)
Dept: WOUND CARE | Age: 77
Discharge: HOME | End: 2017-12-19
Payer: MEDICARE

## 2017-12-19 VITALS
WEIGHT: 138.45 LBS | DIASTOLIC BLOOD PRESSURE: 56 MMHG | RESPIRATION RATE: 18 BRPM | HEART RATE: 68 BPM | HEIGHT: 68 IN | SYSTOLIC BLOOD PRESSURE: 87 MMHG | BODY MASS INDEX: 20.98 KG/M2

## 2017-12-19 PROCEDURE — 97598 DBRDMT OPN WND ADDL 20CM/<: CPT

## 2017-12-19 PROCEDURE — 97597 DBRDMT OPN WND 1ST 20 CM/<: CPT

## 2017-12-19 PROCEDURE — 11042 DBRDMT SUBQ TIS 1ST 20SQCM/<: CPT | Performed by: SURGERY

## 2017-12-19 PROCEDURE — 11045 DBRDMT SUBQ TISS EACH ADDL: CPT | Performed by: SURGERY

## 2017-12-19 PROCEDURE — 11042 DBRDMT SUBQ TIS 1ST 20SQCM/<: CPT

## 2017-12-19 PROCEDURE — 11045 DBRDMT SUBQ TISS EACH ADDL: CPT

## 2017-12-19 ASSESSMENT — PAIN SCALES - GENERAL: PAINLEVEL_OUTOF10: 0

## 2017-12-19 NOTE — PROGRESS NOTES
Wound Care Center   Progress Note and Procedure Note      Elaine Adrian  AGE: 65 y.o. GENDER: female  : 1940  TODAY'S DATE:  2017    Subjective:        HISTORY of PRESENT ILLNESS HPI   Elaine Adrian is a 65 y.o. female who presents today for wound evaluation. Wound Type:pressure  Wound Location:sacrral  Modifying factors:chronic pressure, decreased mobility, shear force and non-adherence    Patient Active Problem List   Diagnosis Code    SA node dysfunction ([facility]) I49.5    Pacemaker U39.1    Diastolic dysfunction S76.2    Decubitus ulcer of sacral region, stage 4 ([facility]) L89.154    Debilitated R53.81    Malnutrition ([facility]) E46    H/O back injury Z87.828    Incontinence of feces R15.9    Severe protein-calorie malnutrition ([facility]) E43       ALLERGIES    Allergies   Allergen Reactions    Morphine Sulfate [Morphine] Anaphylaxis     Pt states she quit breathing      Ciprocinonide [Fluocinolone]     Codeine Phosphate [Codeine] Nausea Only    Quinolones            Objective:      BP (!) 87/56   Pulse 68   Resp 18   Ht 5' 8\" (1.727 m)   Wt 138 lb 7.2 oz (62.8 kg)   BMI 21.05 kg/m²     Post Debridement Measurements and Assessment:  Pressure Ulcer 10/12/17 Coccyx Mid Wound 1.  Stage 4, Coccyx (Active)   Sabine-wound Assessment Dry 2017 11:25 AM   Sabine-Wound Texture Localized edema 2017 11:06 AM   Sabine-Wound Moisture Dry 2017 11:25 AM   Sabine-Wound Color Purple 2017 11:25 AM   Pressure Ulcer Staging Stage IV 2017 11:25 AM   Non-staged Wound Description Not applicable  38:79 AM   Wound Assessment Red;Slough;Pink;Yellow;Granulation tissue;Drainage 2017 11:25 AM   Shape irregular 10/12/2017  3:24 PM   Wound Length (cm) 9.5 cm 2017 12:00 PM   Wound Width (cm) 5.7 cm 2017 12:00 PM   Wound Depth (cm)  2.7 2017 12:00 PM   Calculated Wound Size (cm^2) (l*w) 54.15 cm^2 2017 12:00 PM   Change in Wound Size % (l*w) -200.83 2017 12:00 PM Closure None 12/19/2017 11:25 AM   Dressing Status Old drainage 12/19/2017 11:25 AM   Dressing Changed Changed/New 10/12/2017  4:45 PM   Dressing/Treatment Moist to dry;ABD; Medipore 10/26/2017  3:26 PM   Wound Cleansed Other (Comment) 12/19/2017 11:25 AM   Necrotic Type Yellow Fibrin/Slough 12/19/2017 11:25 AM   Necrotic Amount Medium: 34-66% 12/19/2017 11:25 AM   Granulation Quality Red 12/19/2017 11:25 AM   Drainage Amount Large 12/19/2017 11:25 AM   Drainage Description Serosanguinous 12/19/2017 11:25 AM   Odor None 12/19/2017 11:25 AM   Epithelialization None present 12/19/2017 11:25 AM   Distance Tunneling (cm) 0 cm 12/19/2017 11:25 AM   Tunneling Position ___ O'Clock 0 12/19/2017 11:25 AM   Tunneling Maxium Distance (cm) 0 12/19/2017 11:25 AM   Undermining Starts ___ O'Clock 12 12/19/2017 11:25 AM   Undermining Ends___ O'Clock 5 12/19/2017 11:25 AM   Undermining Maxium Distance (cm) 4.5 12/19/2017 11:25 AM   [place]%Wound Bed 20 12/19/2017 11:25 AM   Red%Wound Bed 60 12/19/2017 11:25 AM   Yellow%Wound Bed 15 12/19/2017 11:25 AM   Black%Wound Bed 5 12/19/2017 11:25 AM   Purple%Wound Bed 0 12/19/2017 11:25 AM   Margins Unattached edges 12/19/2017 11:25 AM   Exposed structure Bone 12/19/2017 11:25 AM   Debridement per physician Subcutaneous 12/19/2017 12:00 PM   Time out Yes 12/19/2017 12:00 PM   Procedural Pain 0 12/19/2017 12:00 PM   Post procedural Pain 0 12/19/2017 12:00 PM   Number of days: 67      The patients pain isPain Level: 0  . Wound is has worsened slightly. Please refer to nursing measurements and assessment regarding wound pre and post debridement. Procedure Note:    Wound #: 1     Debridement: Excisional Debridement    Anesthetic: Anesthetic: 2% Lidocaine Gel Topical  Using curette the wound was sharply debrided    down through and including the removal of epidermis, dermis and subcutaneous tissue.       Total Surface Area Debrided:  54 sq cm   Devitalized Tissue Debrided:  fibrin, biofilm

## 2018-01-09 ENCOUNTER — HOSPITAL ENCOUNTER (OUTPATIENT)
Dept: WOUND CARE | Age: 78
Discharge: HOME | End: 2018-01-09

## 2018-01-23 ENCOUNTER — HOSPITAL ENCOUNTER (OUTPATIENT)
Dept: WOUND CARE | Age: 78
Discharge: HOME | End: 2018-01-23
Payer: MEDICARE

## 2018-01-23 VITALS
HEART RATE: 64 BPM | TEMPERATURE: 98 F | WEIGHT: 138.2 LBS | HEIGHT: 68 IN | RESPIRATION RATE: 20 BRPM | BODY MASS INDEX: 20.95 KG/M2

## 2018-01-23 DIAGNOSIS — E43 SEVERE PROTEIN-CALORIE MALNUTRITION (HCC): Primary | ICD-10-CM

## 2018-01-23 DIAGNOSIS — L89.154 DECUBITUS ULCER OF SACRAL REGION, STAGE 4 (HCC): Chronic | ICD-10-CM

## 2018-01-23 PROCEDURE — 97598 DBRDMT OPN WND ADDL 20CM/<: CPT | Performed by: SURGERY

## 2018-01-23 PROCEDURE — 97597 DBRDMT OPN WND 1ST 20 CM/<: CPT | Performed by: SURGERY

## 2018-01-23 PROCEDURE — 97598 DBRDMT OPN WND ADDL 20CM/<: CPT

## 2018-01-23 PROCEDURE — 97597 DBRDMT OPN WND 1ST 20 CM/<: CPT

## 2018-01-23 RX ORDER — THERMOMETER, ELECTRONIC,ORAL
EACH MISCELLANEOUS 2 TIMES DAILY
COMMUNITY
End: 2018-03-06 | Stop reason: ALTCHOICE

## 2018-01-23 RX ORDER — OMEPRAZOLE 20 MG/1
20 CAPSULE, DELAYED RELEASE ORAL DAILY
COMMUNITY
End: 2018-03-06 | Stop reason: ALTCHOICE

## 2018-01-23 ASSESSMENT — PAIN DESCRIPTION - DESCRIPTORS: DESCRIPTORS: ACHING

## 2018-01-23 ASSESSMENT — PAIN DESCRIPTION - LOCATION: LOCATION: BUTTOCKS

## 2018-01-23 ASSESSMENT — PAIN DESCRIPTION - ONSET: ONSET: ON-GOING

## 2018-01-23 ASSESSMENT — PAIN DESCRIPTION - FREQUENCY: FREQUENCY: INTERMITTENT

## 2018-01-23 ASSESSMENT — PAIN SCALES - GENERAL: PAINLEVEL_OUTOF10: 5

## 2018-01-23 ASSESSMENT — PAIN DESCRIPTION - PROGRESSION: CLINICAL_PROGRESSION: NOT CHANGED

## 2018-01-23 ASSESSMENT — PAIN DESCRIPTION - PAIN TYPE: TYPE: CHRONIC PAIN

## 2018-01-30 ENCOUNTER — HOSPITAL ENCOUNTER (OUTPATIENT)
Dept: NUCLEAR MEDICINE | Age: 78
Discharge: HOME | End: 2018-02-01
Payer: MEDICARE

## 2018-01-30 DIAGNOSIS — E43 SEVERE PROTEIN-CALORIE MALNUTRITION (HCC): ICD-10-CM

## 2018-01-30 DIAGNOSIS — L89.154 DECUBITUS ULCER OF SACRAL REGION, STAGE 4 (HCC): Chronic | ICD-10-CM

## 2018-01-30 PROCEDURE — A9561 TC99M OXIDRONATE: HCPCS | Performed by: SURGERY

## 2018-01-30 PROCEDURE — 3430000000 HC RX DIAGNOSTIC RADIOPHARMACEUTICAL: Performed by: SURGERY

## 2018-01-30 PROCEDURE — 78315 BONE IMAGING 3 PHASE: CPT

## 2018-01-30 RX ADMIN — TECHNETIUM TC 99M OXIDRONATE 20 MILLICURIE: 3.15 INJECTION, POWDER, LYOPHILIZED, FOR SOLUTION INTRAVENOUS at 13:29

## 2018-02-06 ENCOUNTER — HOSPITAL ENCOUNTER (OUTPATIENT)
Dept: WOUND CARE | Age: 78
Discharge: HOME | End: 2018-02-06
Payer: MEDICARE

## 2018-02-06 VITALS
BODY MASS INDEX: 20.92 KG/M2 | WEIGHT: 138 LBS | RESPIRATION RATE: 20 BRPM | DIASTOLIC BLOOD PRESSURE: 68 MMHG | HEART RATE: 91 BPM | HEIGHT: 68 IN | SYSTOLIC BLOOD PRESSURE: 107 MMHG | TEMPERATURE: 98.7 F

## 2018-02-06 DIAGNOSIS — M86.8X8 OTHER OSTEOMYELITIS, OTHER SITE (HCC): Chronic | ICD-10-CM

## 2018-02-06 DIAGNOSIS — L89.154 DECUBITUS ULCER OF SACRAL REGION, STAGE 4 (HCC): Primary | Chronic | ICD-10-CM

## 2018-02-06 DIAGNOSIS — L89.154 DECUBITUS ULCER OF SACRAL REGION, STAGE 4 (HCC): Chronic | ICD-10-CM

## 2018-02-06 LAB
ALBUMIN SERPL-MCNC: 2.9 G/DL (ref 3.5–5.2)
ANION GAP SERPL CALCULATED.3IONS-SCNC: 14 MMOL/L (ref 7–19)
BUN BLDV-MCNC: 12 MG/DL (ref 8–23)
C-REACTIVE PROTEIN: 4.26 MG/DL (ref 0–0.5)
CALCIUM SERPL-MCNC: 9.3 MG/DL (ref 8.8–10.2)
CHLORIDE BLD-SCNC: 96 MMOL/L (ref 98–111)
CO2: 29 MMOL/L (ref 22–29)
CREAT SERPL-MCNC: <0.5 MG/DL (ref 0.5–0.9)
GFR NON-AFRICAN AMERICAN: >60
GLUCOSE BLD-MCNC: 85 MG/DL (ref 74–109)
HCT VFR BLD CALC: 42.4 % (ref 37–47)
HEMOGLOBIN: 12.7 G/DL (ref 12–16)
MCH RBC QN AUTO: 27.9 PG (ref 27–31)
MCHC RBC AUTO-ENTMCNC: 30 G/DL (ref 33–37)
MCV RBC AUTO: 93 FL (ref 81–99)
PDW BLD-RTO: 14.5 % (ref 11.5–14.5)
PHOSPHORUS: 3.3 MG/DL (ref 2.5–4.5)
PLATELET # BLD: 371 K/UL (ref 130–400)
PMV BLD AUTO: 11.5 FL (ref 9.4–12.3)
POTASSIUM SERPL-SCNC: 4.1 MMOL/L (ref 3.5–5)
PREALBUMIN: 11 MG/DL (ref 20–40)
RBC # BLD: 4.56 M/UL (ref 4.2–5.4)
SEDIMENTATION RATE, ERYTHROCYTE: 82 MM/HR (ref 0–25)
SODIUM BLD-SCNC: 139 MMOL/L (ref 136–145)
WBC # BLD: 9.2 K/UL (ref 4.8–10.8)

## 2018-02-06 PROCEDURE — 36415 COLL VENOUS BLD VENIPUNCTURE: CPT

## 2018-02-06 PROCEDURE — 80069 RENAL FUNCTION PANEL: CPT

## 2018-02-06 PROCEDURE — 84134 ASSAY OF PREALBUMIN: CPT

## 2018-02-06 PROCEDURE — 85652 RBC SED RATE AUTOMATED: CPT

## 2018-02-06 PROCEDURE — 97598 DBRDMT OPN WND ADDL 20CM/<: CPT

## 2018-02-06 PROCEDURE — 97597 DBRDMT OPN WND 1ST 20 CM/<: CPT

## 2018-02-06 PROCEDURE — 97598 DBRDMT OPN WND ADDL 20CM/<: CPT | Performed by: SURGERY

## 2018-02-06 PROCEDURE — 85027 COMPLETE CBC AUTOMATED: CPT

## 2018-02-06 PROCEDURE — 97597 DBRDMT OPN WND 1ST 20 CM/<: CPT | Performed by: SURGERY

## 2018-02-06 PROCEDURE — 86140 C-REACTIVE PROTEIN: CPT

## 2018-02-06 ASSESSMENT — PAIN DESCRIPTION - PAIN TYPE: TYPE: CHRONIC PAIN

## 2018-02-06 ASSESSMENT — PAIN DESCRIPTION - FREQUENCY: FREQUENCY: INTERMITTENT

## 2018-02-06 ASSESSMENT — PAIN DESCRIPTION - PROGRESSION: CLINICAL_PROGRESSION: NOT CHANGED

## 2018-02-06 ASSESSMENT — PAIN DESCRIPTION - ONSET: ONSET: ON-GOING

## 2018-02-06 ASSESSMENT — PAIN SCALES - GENERAL: PAINLEVEL_OUTOF10: 6

## 2018-02-06 ASSESSMENT — PAIN DESCRIPTION - LOCATION: LOCATION: BUTTOCKS;COCCYX

## 2018-02-06 ASSESSMENT — PAIN DESCRIPTION - DESCRIPTORS: DESCRIPTORS: ACHING;SORE;TENDER

## 2018-02-06 NOTE — PROGRESS NOTES
PM   Change in Wound Size % (l*w) -92.67 2/6/2018  2:35 PM   Closure None 2/6/2018  1:57 PM   Dressing Status Old drainage 2/6/2018  1:57 PM   Dressing Changed Changed/New 1/23/2018  3:10 PM   Dressing/Treatment Moist to dry 1/23/2018  3:10 PM   Wound Cleansed Other (Comment) 1/23/2018  1:54 PM   Necrotic Type Yellow Fibrin/Slough 2/6/2018  1:57 PM   Necrotic Amount Medium: 34-66% 2/6/2018  1:57 PM   Granulation Quality Red 2/6/2018  1:57 PM   Drainage Amount Large 2/6/2018  1:57 PM   Drainage Description Serosanguinous 2/6/2018  1:57 PM   Odor None 2/6/2018  1:57 PM   Epithelialization None present 2/6/2018  1:57 PM   Distance Tunneling (cm) 0 cm 2/6/2018  1:57 PM   Tunneling Position ___ O'Clock 0 2/6/2018  1:57 PM   Tunneling Maxium Distance (cm) 0 2/6/2018  1:57 PM   Undermining Starts ___ O'Clock 9 2/6/2018  1:57 PM   Undermining Ends___ O'Clock 12 2/6/2018  1:57 PM   Undermining Maxium Distance (cm) 3.2 2/6/2018  1:57 PM   [place]%Wound Bed 30 2/6/2018  1:57 PM   Red%Wound Bed 50 2/6/2018  1:57 PM   Yellow%Wound Bed 10 2/6/2018  1:57 PM   Black%Wound Bed 0 2/6/2018  1:57 PM   Purple%Wound Bed 0 2/6/2018  1:57 PM   Margins Unattached edges 2/6/2018  1:57 PM   Exposed structure Bone 2/6/2018  1:57 PM   Debridement per physician Partial thickness 2/6/2018  2:35 PM   Time out Yes 2/6/2018  2:35 PM   Procedural Pain 0 2/6/2018  2:35 PM   Post procedural Pain 0 2/6/2018  2:35 PM   Number of days: 117      The patients pain isPain Level: 6 Pain Type: Chronic pain. Wound is has slightly improved. Please refer to nursing measurements and assessment regarding wound pre and post debridement. Procedure Note:    Wound #: 1     Debridement: Non-excisional Debridement    Anesthetic: Anesthetic: 2% Lidocaine Gel Topical  Using curette the wound was sharply debrided    down through and including the removal of epidermis and dermis.       Total Surface Area Debrided:  38 sq cm   Devitalized Tissue Debrided:  fibrin, biofilm

## 2018-02-20 ENCOUNTER — HOSPITAL ENCOUNTER (OUTPATIENT)
Dept: CT IMAGING | Age: 78
Discharge: HOME | End: 2018-02-20
Payer: MEDICARE

## 2018-02-20 ENCOUNTER — OFFICE VISIT (OUTPATIENT)
Dept: CARDIOLOGY | Age: 78
End: 2018-02-20
Payer: MEDICARE

## 2018-02-20 VITALS
DIASTOLIC BLOOD PRESSURE: 58 MMHG | RESPIRATION RATE: 16 BRPM | SYSTOLIC BLOOD PRESSURE: 104 MMHG | WEIGHT: 140 LBS | HEIGHT: 68 IN | BODY MASS INDEX: 21.22 KG/M2

## 2018-02-20 DIAGNOSIS — M86.8X8 OTHER OSTEOMYELITIS, OTHER SITE (HCC): Chronic | ICD-10-CM

## 2018-02-20 DIAGNOSIS — Z95.0 PACEMAKER: ICD-10-CM

## 2018-02-20 DIAGNOSIS — I49.5 SA NODE DYSFUNCTION (HCC): ICD-10-CM

## 2018-02-20 DIAGNOSIS — L89.154 DECUBITUS ULCER OF SACRAL REGION, STAGE 4 (HCC): Chronic | ICD-10-CM

## 2018-02-20 PROCEDURE — G8484 FLU IMMUNIZE NO ADMIN: HCPCS | Performed by: INTERNAL MEDICINE

## 2018-02-20 PROCEDURE — 6360000004 HC RX CONTRAST MEDICATION: Performed by: SURGERY

## 2018-02-20 PROCEDURE — 93280 PM DEVICE PROGR EVAL DUAL: CPT | Performed by: INTERNAL MEDICINE

## 2018-02-20 PROCEDURE — 1090F PRES/ABSN URINE INCON ASSESS: CPT | Performed by: INTERNAL MEDICINE

## 2018-02-20 PROCEDURE — G8420 CALC BMI NORM PARAMETERS: HCPCS | Performed by: INTERNAL MEDICINE

## 2018-02-20 PROCEDURE — 1123F ACP DISCUSS/DSCN MKR DOCD: CPT | Performed by: INTERNAL MEDICINE

## 2018-02-20 PROCEDURE — 1036F TOBACCO NON-USER: CPT | Performed by: INTERNAL MEDICINE

## 2018-02-20 PROCEDURE — 4040F PNEUMOC VAC/ADMIN/RCVD: CPT | Performed by: INTERNAL MEDICINE

## 2018-02-20 PROCEDURE — 99212 OFFICE O/P EST SF 10 MIN: CPT | Performed by: INTERNAL MEDICINE

## 2018-02-20 PROCEDURE — G8400 PT W/DXA NO RESULTS DOC: HCPCS | Performed by: INTERNAL MEDICINE

## 2018-02-20 PROCEDURE — 74177 CT ABD & PELVIS W/CONTRAST: CPT

## 2018-02-20 PROCEDURE — G8427 DOCREV CUR MEDS BY ELIG CLIN: HCPCS | Performed by: INTERNAL MEDICINE

## 2018-02-20 RX ORDER — TRAMADOL HYDROCHLORIDE 300 MG/1
TABLET, EXTENDED RELEASE ORAL
COMMUNITY
Start: 2018-01-29 | End: 2018-02-20

## 2018-02-20 RX ORDER — ROPINIROLE 0.5 MG/1
0.5 TABLET, FILM COATED ORAL NIGHTLY
COMMUNITY
Start: 2018-02-14

## 2018-02-20 RX ORDER — POTASSIUM CHLORIDE 20 MEQ/1
20 TABLET, EXTENDED RELEASE ORAL 2 TIMES DAILY
COMMUNITY
Start: 2018-02-14 | End: 2019-12-12 | Stop reason: ALTCHOICE

## 2018-02-20 RX ADMIN — IOPAMIDOL 90 ML: 755 INJECTION, SOLUTION INTRAVENOUS at 13:31

## 2018-02-20 NOTE — PROGRESS NOTES
Pacemaker interrogated  Presenting rhythm:  AS VS, AP 8.9%,  2.2%  [place] voltage 2.78 V  Lead status:  Lead impedance within range and stable  Sensing:  P waves 2.8 to 4.0 mV,  R waves 8.0 11.2 mV  Thresholds:  Atrial 0.500V @ 0.4ms, ventricular 1.000@ 0.4ms  Observations:  1 mode switch  Reprogramming for sensitivity and threshold testing  Next [facility] appointment:  5/22/18

## 2018-02-22 ENCOUNTER — HOSPITAL ENCOUNTER (OUTPATIENT)
Dept: WOUND CARE | Age: 78
Discharge: HOME | End: 2018-02-22
Payer: MEDICARE

## 2018-02-22 VITALS
HEART RATE: 70 BPM | BODY MASS INDEX: 20.92 KG/M2 | HEIGHT: 68 IN | SYSTOLIC BLOOD PRESSURE: 85 MMHG | WEIGHT: 138 LBS | DIASTOLIC BLOOD PRESSURE: 56 MMHG | RESPIRATION RATE: 18 BRPM | TEMPERATURE: 97.4 F

## 2018-02-22 PROCEDURE — 97597 DBRDMT OPN WND 1ST 20 CM/<: CPT

## 2018-02-22 PROCEDURE — 97598 DBRDMT OPN WND ADDL 20CM/<: CPT

## 2018-02-22 PROCEDURE — 97598 DBRDMT OPN WND ADDL 20CM/<: CPT | Performed by: SURGERY

## 2018-02-22 PROCEDURE — 97597 DBRDMT OPN WND 1ST 20 CM/<: CPT | Performed by: SURGERY

## 2018-02-22 RX ORDER — TRAMADOL HYDROCHLORIDE 300 MG/1
300 CAPSULE ORAL DAILY
COMMUNITY

## 2018-02-22 RX ORDER — NYSTATIN 100000 U/G
CREAM TOPICAL PRN
COMMUNITY
End: 2018-10-16 | Stop reason: ALTCHOICE

## 2018-02-22 RX ORDER — LOPERAMIDE HYDROCHLORIDE 2 MG/1
2 CAPSULE ORAL 4 TIMES DAILY PRN
COMMUNITY
End: 2018-03-06 | Stop reason: ALTCHOICE

## 2018-02-22 RX ORDER — CLOTRIMAZOLE AND BETAMETHASONE DIPROPIONATE 10; .64 MG/G; MG/G
CREAM TOPICAL 2 TIMES DAILY PRN
COMMUNITY
End: 2018-03-06 | Stop reason: ALTCHOICE

## 2018-02-22 ASSESSMENT — PAIN DESCRIPTION - LOCATION: LOCATION: BUTTOCKS;COCCYX

## 2018-02-22 ASSESSMENT — PAIN DESCRIPTION - DESCRIPTORS: DESCRIPTORS: SORE

## 2018-02-22 ASSESSMENT — PAIN DESCRIPTION - PROGRESSION: CLINICAL_PROGRESSION: NOT CHANGED

## 2018-02-22 ASSESSMENT — PAIN DESCRIPTION - PAIN TYPE: TYPE: ACUTE PAIN

## 2018-02-22 ASSESSMENT — PAIN DESCRIPTION - FREQUENCY: FREQUENCY: INTERMITTENT

## 2018-02-22 ASSESSMENT — PAIN DESCRIPTION - ONSET: ONSET: ON-GOING

## 2018-02-22 ASSESSMENT — PAIN SCALES - GENERAL: PAINLEVEL_OUTOF10: 3

## 2018-03-06 ENCOUNTER — HOSPITAL ENCOUNTER (OUTPATIENT)
Dept: WOUND CARE | Age: 78
Discharge: HOME | End: 2018-03-06
Payer: MEDICARE

## 2018-03-06 VITALS
WEIGHT: 149 LBS | BODY MASS INDEX: 22.58 KG/M2 | DIASTOLIC BLOOD PRESSURE: 55 MMHG | SYSTOLIC BLOOD PRESSURE: 85 MMHG | HEIGHT: 68 IN | TEMPERATURE: 98.8 F | RESPIRATION RATE: 18 BRPM | HEART RATE: 77 BPM

## 2018-03-06 PROCEDURE — 97597 DBRDMT OPN WND 1ST 20 CM/<: CPT | Performed by: SURGERY

## 2018-03-06 PROCEDURE — 97598 DBRDMT OPN WND ADDL 20CM/<: CPT

## 2018-03-06 PROCEDURE — 97598 DBRDMT OPN WND ADDL 20CM/<: CPT | Performed by: SURGERY

## 2018-03-06 PROCEDURE — 97597 DBRDMT OPN WND 1ST 20 CM/<: CPT

## 2018-03-06 RX ORDER — OMEPRAZOLE 20 MG/1
20 CAPSULE, DELAYED RELEASE ORAL DAILY
COMMUNITY

## 2018-03-06 RX ORDER — BISACODYL 10 MG
10 SUPPOSITORY, RECTAL RECTAL DAILY PRN
COMMUNITY
End: 2020-01-21 | Stop reason: SDUPTHER

## 2018-03-06 RX ORDER — THERMOMETER, ELECTRONIC,ORAL
EACH MISCELLANEOUS NIGHTLY
COMMUNITY

## 2018-03-06 RX ORDER — BENZONATATE 100 MG/1
100 CAPSULE ORAL 3 TIMES DAILY PRN
COMMUNITY
End: 2018-12-04 | Stop reason: ALTCHOICE

## 2018-03-06 RX ORDER — DIPHENHYDRAMINE HCL 25 MG
25 TABLET ORAL EVERY 6 HOURS PRN
COMMUNITY
End: 2019-10-29 | Stop reason: ALTCHOICE

## 2018-03-06 ASSESSMENT — PAIN SCALES - GENERAL: PAINLEVEL_OUTOF10: 0

## 2018-03-06 NOTE — PROGRESS NOTES
1:57 PM   Change in Wound Size % (l*w) -82 3/6/2018  1:57 PM   Closure None 3/6/2018  1:57 PM   Dressing Status Old drainage 3/6/2018  1:57 PM   Dressing Changed Changed/New 2/22/2018  3:15 PM   Dressing/Treatment Moist to moist;4x4;ABD; Medipore 2/22/2018  3:15 PM   Wound Cleansed Other (Comment) 3/6/2018  1:57 PM   Necrotic Type Yellow Fibrin/Slough 3/6/2018  1:57 PM   Necrotic Amount Small: 1-33% 3/6/2018  1:57 PM   Granulation Quality Red 3/6/2018  1:57 PM   Drainage Amount Large 3/6/2018  1:57 PM   Drainage Description Serosanguinous 3/6/2018  1:57 PM   Odor None 3/6/2018  1:57 PM   Epithelialization None present 3/6/2018  1:57 PM   Distance Tunneling (cm) 0 cm 3/6/2018  1:57 PM   Tunneling Position ___ O'Clock 0 3/6/2018  1:57 PM   Tunneling Maxium Distance (cm) 0 3/6/2018  1:57 PM   Undermining Starts ___ O'Clock 1 3/6/2018  1:57 PM   Undermining Ends___ O'Clock 5 3/6/2018  1:57 PM   Undermining Maxium Distance (cm) 4.5 3/6/2018  1:57 PM   [place]%Wound Bed 10 3/6/2018  1:57 PM   Red%Wound Bed 90 3/6/2018  1:57 PM   Yellow%Wound Bed 10 3/6/2018  1:57 PM   Black%Wound Bed 0 3/6/2018  1:57 PM   Purple%Wound Bed 0 3/6/2018  1:57 PM   Margins Unattached edges 3/6/2018  1:57 PM   Exposed structure Muscle 3/6/2018  1:57 PM   Debridement per physician Partial thickness 2/22/2018  2:34 PM   Time out Yes 2/22/2018  2:34 PM   Procedural Pain 0 2/22/2018  2:34 PM   Post procedural Pain 0 2/22/2018  2:34 PM   Number of days: 145      The patients pain isPain Level: 0  . Wound is unchanged. Please refer to nursing measurements and assessment regarding wound pre and post debridement. Procedure Note:    Wound #: 1     Debridement: Non-excisional Debridement    Anesthetic:    Using curette the wound was sharply debrided    down through and including the removal of epidermis and dermis.       Total Surface Area Debrided:  32 sq cm   Devitalized Tissue Debrided:  fibrin, biofilm and slough    Percent of Wound Debrided:

## 2018-03-20 ENCOUNTER — HOSPITAL ENCOUNTER (OUTPATIENT)
Dept: WOUND CARE | Age: 78
Discharge: HOME | End: 2018-03-20

## 2018-04-05 ENCOUNTER — HOSPITAL ENCOUNTER (OUTPATIENT)
Dept: WOUND CARE | Age: 78
Discharge: HOME | End: 2018-04-05
Payer: MEDICARE

## 2018-04-05 VITALS
SYSTOLIC BLOOD PRESSURE: 98 MMHG | HEART RATE: 73 BPM | DIASTOLIC BLOOD PRESSURE: 59 MMHG | TEMPERATURE: 98.9 F | RESPIRATION RATE: 16 BRPM

## 2018-04-05 PROCEDURE — 11042 DBRDMT SUBQ TIS 1ST 20SQCM/<: CPT

## 2018-04-05 PROCEDURE — 11042 DBRDMT SUBQ TIS 1ST 20SQCM/<: CPT | Performed by: SURGERY

## 2018-04-05 PROCEDURE — 11045 DBRDMT SUBQ TISS EACH ADDL: CPT | Performed by: SURGERY

## 2018-04-05 PROCEDURE — 11045 DBRDMT SUBQ TISS EACH ADDL: CPT

## 2018-04-05 ASSESSMENT — PAIN DESCRIPTION - ONSET: ONSET: ON-GOING

## 2018-04-05 ASSESSMENT — PAIN DESCRIPTION - FREQUENCY: FREQUENCY: INTERMITTENT

## 2018-04-05 ASSESSMENT — PAIN DESCRIPTION - DESCRIPTORS: DESCRIPTORS: ACHING

## 2018-04-05 ASSESSMENT — PAIN SCALES - GENERAL: PAINLEVEL_OUTOF10: 1

## 2018-04-05 ASSESSMENT — PAIN DESCRIPTION - PROGRESSION: CLINICAL_PROGRESSION: NOT CHANGED

## 2018-04-05 ASSESSMENT — PAIN DESCRIPTION - PAIN TYPE: TYPE: ACUTE PAIN

## 2018-04-05 ASSESSMENT — PAIN DESCRIPTION - LOCATION: LOCATION: BUTTOCKS

## 2018-04-19 ENCOUNTER — HOSPITAL ENCOUNTER (OUTPATIENT)
Dept: WOUND CARE | Age: 78
Discharge: HOME | End: 2018-04-19
Payer: MEDICARE

## 2018-04-19 VITALS
DIASTOLIC BLOOD PRESSURE: 69 MMHG | WEIGHT: 149 LBS | HEIGHT: 68 IN | RESPIRATION RATE: 16 BRPM | TEMPERATURE: 98 F | HEART RATE: 80 BPM | SYSTOLIC BLOOD PRESSURE: 106 MMHG | BODY MASS INDEX: 22.58 KG/M2

## 2018-04-19 PROCEDURE — 97597 DBRDMT OPN WND 1ST 20 CM/<: CPT

## 2018-04-19 PROCEDURE — 97597 DBRDMT OPN WND 1ST 20 CM/<: CPT | Performed by: SURGERY

## 2018-04-19 PROCEDURE — 97598 DBRDMT OPN WND ADDL 20CM/<: CPT | Performed by: SURGERY

## 2018-04-19 PROCEDURE — 97598 DBRDMT OPN WND ADDL 20CM/<: CPT

## 2018-04-19 ASSESSMENT — PAIN DESCRIPTION - DESCRIPTORS: DESCRIPTORS: ACHING;SORE;TENDER

## 2018-04-19 ASSESSMENT — PAIN DESCRIPTION - PROGRESSION: CLINICAL_PROGRESSION: NOT CHANGED

## 2018-04-19 ASSESSMENT — PAIN DESCRIPTION - ONSET: ONSET: ON-GOING

## 2018-04-19 ASSESSMENT — PAIN DESCRIPTION - ORIENTATION: ORIENTATION: RIGHT

## 2018-04-19 ASSESSMENT — PAIN SCALES - GENERAL: PAINLEVEL_OUTOF10: 0

## 2018-04-19 ASSESSMENT — PAIN DESCRIPTION - FREQUENCY: FREQUENCY: INTERMITTENT

## 2018-04-19 ASSESSMENT — PAIN DESCRIPTION - PAIN TYPE: TYPE: ACUTE PAIN

## 2018-04-19 ASSESSMENT — PAIN DESCRIPTION - LOCATION: LOCATION: BUTTOCKS

## 2018-05-10 ENCOUNTER — HOSPITAL ENCOUNTER (OUTPATIENT)
Dept: WOUND CARE | Age: 78
Discharge: HOME | End: 2018-05-10
Payer: MEDICARE

## 2018-05-10 VITALS
WEIGHT: 149 LBS | RESPIRATION RATE: 16 BRPM | SYSTOLIC BLOOD PRESSURE: 115 MMHG | TEMPERATURE: 97.5 F | HEIGHT: 68 IN | HEART RATE: 77 BPM | DIASTOLIC BLOOD PRESSURE: 65 MMHG | BODY MASS INDEX: 22.58 KG/M2

## 2018-05-10 PROCEDURE — 97597 DBRDMT OPN WND 1ST 20 CM/<: CPT | Performed by: SURGERY

## 2018-05-10 PROCEDURE — 97597 DBRDMT OPN WND 1ST 20 CM/<: CPT

## 2018-05-10 ASSESSMENT — PAIN DESCRIPTION - FREQUENCY: FREQUENCY: INTERMITTENT

## 2018-05-10 ASSESSMENT — PAIN DESCRIPTION - ORIENTATION: ORIENTATION: RIGHT

## 2018-05-10 ASSESSMENT — PAIN SCALES - GENERAL: PAINLEVEL_OUTOF10: 4

## 2018-05-10 ASSESSMENT — PAIN DESCRIPTION - PAIN TYPE: TYPE: ACUTE PAIN

## 2018-05-10 ASSESSMENT — PAIN DESCRIPTION - DESCRIPTORS: DESCRIPTORS: ACHING;TENDER;SORE

## 2018-05-10 ASSESSMENT — PAIN DESCRIPTION - LOCATION: LOCATION: BUTTOCKS

## 2018-05-10 ASSESSMENT — PAIN DESCRIPTION - ONSET: ONSET: ON-GOING

## 2018-05-10 ASSESSMENT — PAIN DESCRIPTION - PROGRESSION: CLINICAL_PROGRESSION: NOT CHANGED

## 2018-05-23 ENCOUNTER — TELEPHONE (OUTPATIENT)
Dept: CARDIOLOGY | Age: 78
End: 2018-05-23

## 2018-05-24 DIAGNOSIS — Z95.0 PACEMAKER: Primary | ICD-10-CM

## 2018-05-24 DIAGNOSIS — I49.5 SA NODE DYSFUNCTION (HCC): ICD-10-CM

## 2018-05-24 PROCEDURE — 93294 REM INTERROG EVL PM/LDLS PM: CPT | Performed by: CLINICAL NURSE SPECIALIST

## 2018-05-24 PROCEDURE — 93296 REM INTERROG EVL PM/IDS: CPT | Performed by: CLINICAL NURSE SPECIALIST

## 2018-06-05 ENCOUNTER — HOSPITAL ENCOUNTER (OUTPATIENT)
Dept: WOUND CARE | Age: 78
Discharge: HOME | End: 2018-06-05
Payer: MEDICARE

## 2018-06-05 VITALS
WEIGHT: 141.5 LBS | RESPIRATION RATE: 18 BRPM | HEART RATE: 71 BPM | TEMPERATURE: 97.8 F | BODY MASS INDEX: 21.44 KG/M2 | DIASTOLIC BLOOD PRESSURE: 58 MMHG | HEIGHT: 68 IN | SYSTOLIC BLOOD PRESSURE: 93 MMHG

## 2018-06-05 PROCEDURE — 97597 DBRDMT OPN WND 1ST 20 CM/<: CPT | Performed by: SURGERY

## 2018-06-05 PROCEDURE — 97597 DBRDMT OPN WND 1ST 20 CM/<: CPT

## 2018-06-05 ASSESSMENT — PAIN SCALES - GENERAL: PAINLEVEL_OUTOF10: 0

## 2018-06-26 ENCOUNTER — HOSPITAL ENCOUNTER (OUTPATIENT)
Dept: WOUND CARE | Age: 78
Discharge: HOME | End: 2018-06-26
Payer: MEDICARE

## 2018-06-26 VITALS
HEIGHT: 68 IN | RESPIRATION RATE: 16 BRPM | HEART RATE: 65 BPM | TEMPERATURE: 98.6 F | WEIGHT: 143.74 LBS | BODY MASS INDEX: 21.78 KG/M2

## 2018-06-26 PROCEDURE — 97597 DBRDMT OPN WND 1ST 20 CM/<: CPT | Performed by: SURGERY

## 2018-06-26 PROCEDURE — 97597 DBRDMT OPN WND 1ST 20 CM/<: CPT

## 2018-06-26 ASSESSMENT — PAIN SCALES - GENERAL: PAINLEVEL_OUTOF10: 0

## 2018-07-17 ENCOUNTER — HOSPITAL ENCOUNTER (OUTPATIENT)
Dept: WOUND CARE | Age: 78
Discharge: HOME | End: 2018-07-17
Payer: MEDICARE

## 2018-07-17 VITALS
TEMPERATURE: 98.2 F | RESPIRATION RATE: 18 BRPM | WEIGHT: 143 LBS | BODY MASS INDEX: 21.67 KG/M2 | HEART RATE: 72 BPM | HEIGHT: 68 IN

## 2018-07-17 PROCEDURE — 97597 DBRDMT OPN WND 1ST 20 CM/<: CPT | Performed by: SURGERY

## 2018-07-17 PROCEDURE — 97597 DBRDMT OPN WND 1ST 20 CM/<: CPT

## 2018-07-17 ASSESSMENT — PAIN SCALES - GENERAL: PAINLEVEL_OUTOF10: 0

## 2018-07-17 ASSESSMENT — PAIN DESCRIPTION - ONSET: ONSET: ON-GOING

## 2018-07-17 ASSESSMENT — PAIN DESCRIPTION - FREQUENCY: FREQUENCY: INTERMITTENT

## 2018-07-17 ASSESSMENT — PAIN DESCRIPTION - PROGRESSION: CLINICAL_PROGRESSION: NOT CHANGED

## 2018-07-17 ASSESSMENT — PAIN DESCRIPTION - LOCATION: LOCATION: COCCYX

## 2018-07-17 ASSESSMENT — PAIN DESCRIPTION - PAIN TYPE: TYPE: ACUTE PAIN

## 2018-07-17 NOTE — PROGRESS NOTES
Wound Care Center   Progress Note and Procedure Note      Velma Yuen  AGE: 77 y.o. GENDER: female  : 1940  TODAY'S DATE:  2018    Subjective:        HISTORY of PRESENT ILLNESS HPI   Velma Yeun is a 77 y.o. female who presents today for wound evaluation. Wound Type:pressure  Wound Location:sacral  Modifying factors:chronic pressure, decreased mobility, shear force and malnutrition    Patient Active Problem List   Diagnosis Code    SA node dysfunction ([facility]) I49.5    Pacemaker Z28.0    Diastolic dysfunction A94.7    Decubitus ulcer of sacral region, stage 4 ([facility]) L89.154    Debilitated R53.81    Malnutrition ([address] E46    H/O back injury Z87.828    Incontinence of feces R15.9    Severe protein-calorie malnutrition ([facility]) E43    Other osteomyelitis, other site ([facility]) M86.8X8       ALLERGIES    Allergies   Allergen Reactions    Morphine Sulfate [Morphine] Anaphylaxis     Pt states she quit breathing      Ciprocinonide [Fluocinolone]     Codeine Phosphate [Codeine] Nausea Only    Quinolones            Objective:      Pulse 72   Temp 98.2 °F (36.8 °C) (Temporal)   Resp 18   Ht 5' 8\" (1.727 m)   Wt 143 lb (64.9 kg)   BMI 21.74 kg/m²     Post Debridement Measurements and Assessment:  Pressure Ulcer 10/12/17 Coccyx Mid Wound 1: Coccyx (Pressure Stage 4) (Active)   Sabine-wound Assessment Dry; Intact 2018 10:25 AM   Sabine-Wound Texture Rash 5/10/2018  2:46 PM   Sabine-Wound Moisture Dry; Intact 2018 10:25 AM   Sabine-Wound Color Rubor 2018 10:25 AM   Pressure Ulcer Staging Stage IV 2018 10:25 AM   Non-staged Wound Description Not applicable 1756 50:35 AM   Wound Assessment Red;Pink;Slough; Yellow;Drainage;Granulation tissue 2018 10:25 AM   Shape irregular 10/12/2017  3:24 PM   Wound Length (cm) 3.6 cm 2018 10:35 AM   Wound Width (cm) 3 cm 2018 10:35 AM   Wound Depth (cm)  3.3 2018 10:35 AM   Calculated Wound Size (cm^2) (l*w) 10.8 cm^2 2018 10:35 AM

## 2018-07-27 ENCOUNTER — LAB REQUISITION (OUTPATIENT)
Dept: LAB | Facility: HOSPITAL | Age: 78
End: 2018-07-27

## 2018-07-27 DIAGNOSIS — Z00.00 ROUTINE GENERAL MEDICAL EXAMINATION AT A HEALTH CARE FACILITY: ICD-10-CM

## 2018-07-27 LAB
BUN BLD-MCNC: 19 MG/DL (ref 5–21)
CREAT BLD-MCNC: 0.55 MG/DL (ref 0.5–1.4)
GFR SERPL CREATININE-BSD FRML MDRD: 107 ML/MIN/1.73

## 2018-07-27 PROCEDURE — 82565 ASSAY OF CREATININE: CPT

## 2018-07-27 PROCEDURE — 84520 ASSAY OF UREA NITROGEN: CPT

## 2018-07-31 ENCOUNTER — HOSPITAL ENCOUNTER (OUTPATIENT)
Dept: WOUND CARE | Age: 78
Discharge: HOME | End: 2018-07-31

## 2018-08-07 ENCOUNTER — HOSPITAL ENCOUNTER (OUTPATIENT)
Dept: WOUND CARE | Age: 78
Discharge: HOME | End: 2018-08-07
Payer: MEDICARE

## 2018-08-07 VITALS
HEIGHT: 68 IN | DIASTOLIC BLOOD PRESSURE: 59 MMHG | WEIGHT: 136 LBS | TEMPERATURE: 98.6 F | HEART RATE: 69 BPM | SYSTOLIC BLOOD PRESSURE: 98 MMHG | RESPIRATION RATE: 16 BRPM | BODY MASS INDEX: 20.61 KG/M2

## 2018-08-07 PROCEDURE — 97597 DBRDMT OPN WND 1ST 20 CM/<: CPT

## 2018-08-07 PROCEDURE — 97597 DBRDMT OPN WND 1ST 20 CM/<: CPT | Performed by: NURSE PRACTITIONER

## 2018-08-07 RX ORDER — CEFDINIR 300 MG/1
300 CAPSULE ORAL 2 TIMES DAILY
COMMUNITY
End: 2018-10-16 | Stop reason: ALTCHOICE

## 2018-08-07 RX ORDER — QUETIAPINE FUMARATE 50 MG/1
50 TABLET, FILM COATED ORAL NIGHTLY
COMMUNITY
End: 2018-10-16 | Stop reason: ALTCHOICE

## 2018-08-07 ASSESSMENT — PAIN SCALES - GENERAL: PAINLEVEL_OUTOF10: 0

## 2018-08-07 NOTE — PROGRESS NOTES
NONFORMULARY 3 times daily House Supplement on snack cart      diphenhydrAMINE (BENADRYL) 25 MG tablet Take 112.4 mg by mouth every 6 hours as needed for Itching      benzonatate (TESSALON PERLES) 100 MG capsule Take 100 mg by mouth 3 times daily as needed for Cough      magnesium citrate solution Take 296 mLs by mouth once as needed for Constipation      bisacodyl (DULCOLAX) 5 MG EC tablet Take 5 mg by mouth nightly as needed for Constipation      magnesium hydroxide (MILK OF MAGNESIA) 400 MG/5ML suspension Take by mouth daily as needed for Constipation      tolnaftate (TINACTIN) 1 % cream Apply topically 2 times daily Apply topically 2 times daily.  Amino Acids-Protein Hydrolys (PRO-STAT PO) Take 30 mLs by mouth daily      nystatin (MYCOSTATIN) 567775 UNIT/GM cream Apply topically as needed for Dry Skin Apply topically 2 times daily.  traMADol (ULTRAM ER) 300 MG extended releae tablet Take 300 mg by mouth daily.       sodium hypochlorite (DAKINS) 0.125 % SOLN Irrigate with as directed 2 times daily as needed for Wound Care      potassium chloride (KLOR-CON M) 20 MEQ extended release tablet Take 20 mEq by mouth daily       rOPINIRole (REQUIP) 0.5 MG tablet Take 0.5 mg by mouth nightly       SANTYL 250 UNIT/GM ointment Apply topically 2 times daily      guaiFENesin (MUCINEX) 600 MG extended release tablet Take 1,200 mg by mouth 2 times daily      Multiple Vitamins-Minerals (THERAPEUTIC MULTIVITAMIN-MINERALS) tablet Take 1 tablet by mouth daily      potassium chloride (KLOR-CON) 20 MEQ packet Take 20 mEq by mouth daily      furosemide (LASIX) 20 MG tablet 20 mg 2 times daily       citalopram (CELEXA) 40 MG tablet Take 40 mg by mouth nightly       traMADol (ULTRAM) 50 MG tablet Take 50 mg by mouth nightly       brimonidine-timolol (COMBIGAN) 0.2-0.5 % ophthalmic solution Place 1 drop into the left eye 3 times daily       Menthol-Zinc Oxide (CALMOSEPTINE EX) Apply topically Apply at dressing Mother     Heart Disease Father      Social History   Substance Use Topics    Smoking status: Former Smoker     Types: Cigarettes     Quit date: 6/19/2012    Smokeless tobacco: Never Used    Alcohol use No       Review of Systems    Constitutional - no significant activity change, appetite change, or unexpected weight change. No fever or chills. No diaphoresis or significant fatigue. HENT - no significant rhinorrhea or epistaxis. No tinnitus or significant hearing loss. Eyes - no sudden vision change or amaurosis. Respiratory - no significant shortness of breath, wheezing, or stridor. Cardiovascular - no chest pain, syncope, or significant dizziness. Gastrointestinal - no abdominal swelling or pain. No severe constipation or diarrhea  Genitourinary - No difficulty urinating, dysuria, frequency, or urgency. Musculoskeletal - no back pain,  Skin - no color change, rash, pallor, sacral wound. Neurologic - no dizziness, facial asymmetry, or light headedness. Hematologic - no easy bruising or excessive bleeding. Psychiatric - no severe anxiety or nervousness. No confusion. All other review of systems are negative. Physical Exam    BP (!) 98/59   Pulse 69   Temp 98.6 °F (37 °C) (Temporal)   Resp 16   Ht 5' 8\" (1.727 m)   Wt 136 lb (61.7 kg)   BMI 20.68 kg/m²     Constitutional - well developed, well nourished. No diaphoresis or acute distress. HENT - head normocephalic. Septum appears midline. Eyes - conjunctiva normal.  EOMS normal.  No exudate. No icterus. Neck- ROM appears normal, no tracheal deviation. Cardiovascular - Regular rate and rhythm. Heart sounds are normal.   Extremities - Radial and brachial pulses are 2+ to palpation bilaterally. No signs atheroembolic event. Pulmonary - effort appears normal.  No respiratory distress. Lungs - Breath sounds normal. No wheezes or rales. GI - Abdomen - soft, non tender, bowel sounds X 4 quadrants.      Genitourinary -

## 2018-08-21 ENCOUNTER — OFFICE VISIT (OUTPATIENT)
Dept: CARDIOLOGY | Age: 78
End: 2018-08-21
Payer: MEDICARE

## 2018-08-21 VITALS
HEART RATE: 62 BPM | WEIGHT: 140 LBS | BODY MASS INDEX: 21.22 KG/M2 | SYSTOLIC BLOOD PRESSURE: 122 MMHG | HEIGHT: 68 IN | DIASTOLIC BLOOD PRESSURE: 62 MMHG

## 2018-08-21 DIAGNOSIS — I49.5 SA NODE DYSFUNCTION (HCC): Primary | ICD-10-CM

## 2018-08-21 DIAGNOSIS — I51.89 DIASTOLIC DYSFUNCTION: ICD-10-CM

## 2018-08-21 DIAGNOSIS — Z95.0 PACEMAKER: ICD-10-CM

## 2018-08-21 PROCEDURE — G8420 CALC BMI NORM PARAMETERS: HCPCS | Performed by: NURSE PRACTITIONER

## 2018-08-21 PROCEDURE — G8427 DOCREV CUR MEDS BY ELIG CLIN: HCPCS | Performed by: NURSE PRACTITIONER

## 2018-08-21 PROCEDURE — 1090F PRES/ABSN URINE INCON ASSESS: CPT | Performed by: NURSE PRACTITIONER

## 2018-08-21 PROCEDURE — 1101F PT FALLS ASSESS-DOCD LE1/YR: CPT | Performed by: NURSE PRACTITIONER

## 2018-08-21 PROCEDURE — 93280 PM DEVICE PROGR EVAL DUAL: CPT | Performed by: NURSE PRACTITIONER

## 2018-08-21 PROCEDURE — 99214 OFFICE O/P EST MOD 30 MIN: CPT | Performed by: NURSE PRACTITIONER

## 2018-08-21 NOTE — PROGRESS NOTES
[facility], [place]. [address], [facility] 969, [address] [facility], [facility]  [phone] office  [phone] fax      OFFICE VISIT:  2018    Quinn Pinzon - : 1940    Reason For Visit:  Jackie Byrd is a 77 y.o. female who is here for 6 Month Follow-Up (Patient presents for cardiolog follow up and pacer check doing well.) and Bradycardia (s/p pacer)    The patient presents today for cardiology follow up and pacemaker interrogation. Overall, the patient is doing well from a cardiac standpoint without symptoms to suggest myocardial ischemia. BP is normal.  The patient's PCP monitors cholesterol. Patient is a nursing home resident. Subjective  Jackie Byrd denies exertional chest pain, shortness of breath, orthopnea, paroxysmal nocturnal dyspnea, syncope, presyncope, sustained arrythmia, edema and fatigue. The patient denies numbness or weakness to suggest cerebrovascular accident or transient ischemic attack.       Quinn Pinzon has the following history as recorded in [facility]:    Patient Active Problem List   Diagnosis Code    SA node dysfunction ([address] I49.5    Pacemaker V81.1    Diastolic dysfunction M80.2    Decubitus ulcer of sacral region, stage 4 ([address] L89.154    Debilitated R53.81    Malnutrition ([address] E46    H/O back injury Z87.828    Incontinence of feces R15.9    Severe protein-calorie malnutrition ([address] E43    Other osteomyelitis, other site [facility]) G66.2A0     Past Medical History:   Diagnosis Date    CAD (coronary artery disease)     Depressive disorder     GERD (gastroesophageal reflux disease)     bleeding ulcer    Hemorrhage of gastrointestinal tract     Hyperlipidemia     Hypertension     Osteoarthrosis     Pacemaker     Retention of fluid     Sick sinus syndrome ([address]     Sinus node dysfunction (HCC)      Past Surgical History:   Procedure Laterality Date    APPENDECTOMY      BACK SURGERY      CHOLECYSTECTOMY      by mouth daily       rOPINIRole (REQUIP) 0.5 MG tablet Take 0.5 mg by mouth nightly       SANTYL 250 UNIT/GM ointment Apply topically 2 times daily      guaiFENesin (MUCINEX) 600 MG extended release tablet Take 1,200 mg by mouth 2 times daily      Multiple Vitamins-Minerals (THERAPEUTIC MULTIVITAMIN-MINERALS) tablet Take 1 tablet by mouth daily      potassium chloride (KLOR-CON) 20 MEQ packet Take 20 mEq by mouth daily      furosemide (LASIX) 20 MG tablet 20 mg 2 times daily       citalopram (CELEXA) 40 MG tablet Take 40 mg by mouth nightly       traMADol (ULTRAM) 50 MG tablet Take 50 mg by mouth nightly       brimonidine-timolol (COMBIGAN) 0.2-0.5 % ophthalmic solution Place 1 drop into the left eye 3 times daily       Menthol-Zinc Oxide (CALMOSEPTINE EX) Apply topically Apply at dressing change      Artificial Tear Solution (BION TEARS OP) Apply to eye every 2 hours as needed       ipratropium (ATROVENT) 0.03 % nasal spray 2 sprays by Nasal route 3 times daily       bacitracin 500 UNIT/GM ophthalmic ointment Place into the left eye nightly Every 4 hours.  atorvastatin (LIPITOR) 10 MG tablet Take 10 mg by mouth daily      albuterol sulfate  (90 BASE) MCG/ACT inhaler Inhale 2 puffs into the lungs every 6 hours as needed for Wheezing      acetaminophen (TYLENOL) 500 MG tablet Take 500 mg by mouth every 6 hours as needed for Pain      loratadine (CLARITIN) 10 MG tablet Take 10 mg by mouth nightly       Cholecalciferol (VITAMIN D3) 11872 UNITS CAPS Take 1 capsule by mouth every 14 days       traZODone (DESYREL) 150 MG tablet Take 75 mg by mouth nightly 1/2 tablet of 150mg at bedtime      baclofen (LIORESAL) 10 MG tablet Take 10 mg by mouth 3 times daily as needed       valACYclovir (VALTREX) 500 MG tablet Take 500 mg by mouth 2 times daily      docusate sodium (COLACE) 100 MG capsule Take 200 mg by mouth 2 times daily        No current facility-administered medications for this visit. Allergies: Morphine sulfate [morphine]; Ciprocinonide [fluocinolone]; Codeine phosphate [codeine]; and Quinolones    Review of Systems  Constitutional - no appetite change, or unexpected weight change. No fever, chills or diaphoresis. No significant change in activity level or new onset of fatigue. HEENT - no significant rhinorrhea or epistaxis. No tinnitus or significant hearing loss. Eyes - no sudden vision change or amaurosis. No corneal arcus, xantholasma, subconjunctival hemorrhage or discharge. Respiratory - no significant wheezing, stridor, apnea or cough. No dyspnea on exertion or shortness of air. Cardiovascular - no exertional chest pain to suggest myocardial ischemia. No orthopnea or PND. No sensation of sustained arrythmia. No occurrence of slow heart rate. No palpitations. No claudication. No leg edema. Gastrointestinal - no abdominal swelling or pain. No blood in stool. No severe constipation, diarrhea, nausea, or vomiting. Genitourinary - no dysuria, frequency, or urgency. No flank pain or hematuria. Musculoskeletal - no back pain or myalgia. Transported via wheelchair. Extremities - no clubbing, cyanosis or edema. Skin - no color change or rash. No pallor. No new surgical incision. Neurologic - no speech difficulty, facial asymmetry or lateralizing weakness. No seizures, presyncope or syncope. No significant dizziness. Hematologic - no easy bruising or excessive bleeding. Psychiatric - no severe anxiety or insomnia. No confusion. All other review of systems are negative. Objective  Vital Signs - /62   Pulse 62   Ht 5' 8\" (1.727 m)   Wt 140 lb (63.5 kg)   BMI 21.29 kg/m²   General - Emmanuel Rodriguez is alert, cooperative, and pleasant. Well groomed. No acute distress. Body habitus - Body mass index is 21.29 kg/m². HEENT - Head is normocephalic. No circumoral cyanosis. Dentition is normal.  EYES -   Lids normal without ptosis.   No discharge, edema or

## 2018-08-28 ENCOUNTER — HOSPITAL ENCOUNTER (OUTPATIENT)
Dept: WOUND CARE | Age: 78
Discharge: HOME | End: 2018-08-28
Payer: MEDICARE

## 2018-08-28 VITALS
RESPIRATION RATE: 14 BRPM | WEIGHT: 140 LBS | TEMPERATURE: 99.6 F | BODY MASS INDEX: 21.22 KG/M2 | SYSTOLIC BLOOD PRESSURE: 109 MMHG | HEART RATE: 76 BPM | HEIGHT: 68 IN | DIASTOLIC BLOOD PRESSURE: 59 MMHG

## 2018-08-28 PROCEDURE — 97597 DBRDMT OPN WND 1ST 20 CM/<: CPT

## 2018-08-28 PROCEDURE — 97597 DBRDMT OPN WND 1ST 20 CM/<: CPT | Performed by: NURSE PRACTITIONER

## 2018-08-28 ASSESSMENT — PAIN SCALES - GENERAL: PAINLEVEL_OUTOF10: 0

## 2018-08-28 NOTE — PROGRESS NOTES
3.  Physiologic. Psychiatric - mood, affect, and behavior appear normal.  Judgment and thought processes appear normal.  Skin - sacral wound    Post Debridement Measurements and Assessment:    Pressure Ulcer 10/12/17 Coccyx Mid Wound 1: Coccyx (Pressure Stage 4) (Active)   Sabine-wound Assessment Dry; Intact 8/28/2018  1:45 PM   Sabine-Wound Texture Scarring 8/28/2018  1:45 PM   Sabine-Wound Moisture Dry; Intact 8/28/2018  1:45 PM   Sabine-Wound Color Rubor 8/28/2018  1:45 PM   Pressure Ulcer Staging Stage IV 8/28/2018  1:45 PM   Non-staged Wound Description Not applicable 2/41/7658  6:75 PM   Wound Assessment Red;Pink;Slough; Yellow;Drainage;Granulation tissue 8/28/2018  1:45 PM   Shape irregular 10/12/2017  3:24 PM   Wound Length (cm) 3.8 cm 8/28/2018  2:05 PM   Wound Width (cm) 2.7 cm 8/28/2018  2:05 PM   Wound Depth (cm)  3.5 8/28/2018  2:05 PM   Calculated Wound Size (cm^2) (l*w) 10.26 cm^2 8/28/2018  2:05 PM   Change in Wound Size % (l*w) 43 8/28/2018  2:05 PM   Closure None 8/28/2018  1:45 PM   Dressing Status Old drainage 8/28/2018  1:45 PM   Dressing Changed Changed/New 8/7/2018  2:25 PM   Dressing/Treatment Moist to moist;ABD 8/7/2018  2:15 PM   Wound Cleansed Rinsed/Irrigated with saline 8/28/2018  1:45 PM   Necrotic Type Yellow Fibrin/Slough 8/28/2018  1:45 PM   Necrotic Amount None present 8/28/2018  1:45 PM   Granulation Quality Red 8/28/2018  1:45 PM   Drainage Amount Large 8/28/2018  1:45 PM   Drainage Description Serosanguinous 8/28/2018  1:45 PM   Odor None 8/28/2018  1:45 PM   Epithelialization None present 8/7/2018  1:15 PM   Distance Tunneling (cm) 0 cm 8/28/2018  1:45 PM   Tunneling Position ___ O'Clock 0 8/28/2018  1:45 PM   Tunneling Maxium Distance (cm) 0 8/28/2018  1:45 PM   Undermining Starts ___ O'Clock 0 8/28/2018  1:45 PM   Undermining Ends___ O'Clock 0 8/28/2018  1:45 PM   Undermining Maxium Distance (cm) 0 8/28/2018  1:45 PM   [place]%Wound Bed 10 8/28/2018  1:45 PM   Red%Wound Bed 90 8/28/2018 cream of barrier cream to periwound -irritated area. Wash with soap and water. Cover with 1/4 STR DAKINS moistened ROLLED GAUZE to wound bed. Cover with ABD. Secure with hytape. Change twice daily .     Apply Tinactin to both feet twice daily.      Treatment Orders:   Avoid Pressure to wound site. Turn frequently (turn at least every two hours when in bed)  No sitting in chair.      Off-load heels by applying heel-lift boots or \"float\" heels by placing pillows under calves so that heels do not touch mattress.     Continue Protein rich diet (unless restricted by your physician). Please restart Protein supplements.     Take Multivitamin daily     Please use low air loss bed    [facility] followup visit ______3 weeks_______________________  (Please note your next appointment above and if you are unable to keep, kindly give a 24 hour notice. Thank you.)          If you experience any of the following, please call the [facility] during business hours:    * Increase in Pain  * Temperature over 101  * Increase in drainage from your wound  * Drainage with a foul odor  * Bleeding  * Increase in swelling  * Need for compression bandage changes due to slippage, breakthrough drainage. If you need medical attention outside of the business hours of the [facility] please contact your PCP or go to the nearest emergency room.

## 2018-09-18 ENCOUNTER — HOSPITAL ENCOUNTER (OUTPATIENT)
Dept: WOUND CARE | Age: 78
Discharge: HOME | End: 2018-09-18

## 2018-09-25 ENCOUNTER — HOSPITAL ENCOUNTER (OUTPATIENT)
Dept: WOUND CARE | Age: 78
Discharge: HOME | End: 2018-09-25
Payer: MEDICARE

## 2018-09-25 VITALS
RESPIRATION RATE: 16 BRPM | WEIGHT: 140 LBS | HEART RATE: 78 BPM | BODY MASS INDEX: 21.22 KG/M2 | DIASTOLIC BLOOD PRESSURE: 59 MMHG | TEMPERATURE: 98.8 F | SYSTOLIC BLOOD PRESSURE: 103 MMHG | HEIGHT: 68 IN

## 2018-09-25 PROCEDURE — 97597 DBRDMT OPN WND 1ST 20 CM/<: CPT

## 2018-09-25 PROCEDURE — 97597 DBRDMT OPN WND 1ST 20 CM/<: CPT | Performed by: NURSE PRACTITIONER

## 2018-09-25 ASSESSMENT — PAIN DESCRIPTION - ORIENTATION: ORIENTATION: RIGHT

## 2018-09-25 ASSESSMENT — PAIN DESCRIPTION - PROGRESSION: CLINICAL_PROGRESSION: NOT CHANGED

## 2018-09-25 ASSESSMENT — PAIN DESCRIPTION - PAIN TYPE: TYPE: ACUTE PAIN

## 2018-09-25 ASSESSMENT — PAIN DESCRIPTION - ONSET: ONSET: ON-GOING

## 2018-09-25 ASSESSMENT — PAIN SCALES - GENERAL: PAINLEVEL_OUTOF10: 0

## 2018-09-25 ASSESSMENT — PAIN DESCRIPTION - FREQUENCY: FREQUENCY: INTERMITTENT

## 2018-09-25 ASSESSMENT — PAIN DESCRIPTION - DESCRIPTORS: DESCRIPTORS: ACHING;TENDER;SORE

## 2018-09-25 ASSESSMENT — PAIN DESCRIPTION - LOCATION: LOCATION: COCCYX

## 2018-09-25 NOTE — PROGRESS NOTES
100 mg by mouth 3 times daily as needed for Cough      magnesium citrate solution Take 296 mLs by mouth once as needed for Constipation      bisacodyl (DULCOLAX) 5 MG EC tablet Take 5 mg by mouth nightly as needed for Constipation      magnesium hydroxide (MILK OF MAGNESIA) 400 MG/5ML suspension Take by mouth daily as needed for Constipation      tolnaftate (TINACTIN) 1 % cream Apply topically 2 times daily Apply topically 2 times daily.  Amino Acids-Protein Hydrolys (PRO-STAT PO) Take 30 mLs by mouth daily      nystatin (MYCOSTATIN) 771528 UNIT/GM cream Apply topically as needed for Dry Skin Apply topically 2 times daily.  traMADol (ULTRAM ER) 300 MG extended releae tablet Take 300 mg by mouth daily.  sodium hypochlorite (DAKINS) 0.125 % SOLN Irrigate with as directed 2 times daily as needed for Wound Care      potassium chloride (KLOR-CON M) 20 MEQ extended release tablet Take 20 mEq by mouth daily       rOPINIRole (REQUIP) 0.5 MG tablet Take 0.5 mg by mouth nightly       SANTYL 250 UNIT/GM ointment Apply topically 2 times daily      guaiFENesin (MUCINEX) 600 MG extended release tablet Take 1,200 mg by mouth 2 times daily      Multiple Vitamins-Minerals (THERAPEUTIC MULTIVITAMIN-MINERALS) tablet Take 1 tablet by mouth daily      furosemide (LASIX) 20 MG tablet 20 mg 2 times daily       citalopram (CELEXA) 40 MG tablet Take 40 mg by mouth nightly       traMADol (ULTRAM) 50 MG tablet Take 50 mg by mouth nightly       brimonidine-timolol (COMBIGAN) 0.2-0.5 % ophthalmic solution Place 1 drop into the left eye 3 times daily       Menthol-Zinc Oxide (CALMOSEPTINE EX) Apply topically Apply at dressing change      Artificial Tear Solution (BION TEARS OP) Apply to eye every 2 hours as needed       ipratropium (ATROVENT) 0.03 % nasal spray 2 sprays by Nasal route 3 times daily       bacitracin 500 UNIT/GM ophthalmic ointment Place into the left eye nightly Every 4 hours.        atorvastatin Alcohol use No       Review of Systems  Constitutional / general:  No fever / chills / sweats  Head:  No headache / neck stiffness / trauma / visual change  Eyes:  No blurry vision / acute visual change or loss / itching / redness  ENT: No sore throat / hoarseness / nasal drainage / ear pain  CV:  No chest pain / palpitations/ orthopnea   Respiratory:  No cough / shortness of breath / sputum / hemoptysis  GI: No nausea / vomiting / abdominal pain / diarrhea / constipation  :  No dysuria / hesitancy / urgency / hematuria   Vascular: No edema / claudication / thrombosis / varicosities  Heme / endocrine: No easy bruising / bleeding / excessive sweating / heat or cold intolerance  Psychiatric:  No depression / anxiety / insomnia / mood changes  Skin:  No new rashes / lesions / skin hair or nail changes  Wound: sacral wound  All other review of systems are negative. Physical Exam    BP (!) 103/59   Pulse 78   Temp 98.8 °F (37.1 °C) (Temporal)   Resp 16   Ht 5' 8\" (1.727 m)   Wt 140 lb (63.5 kg)   BMI 21.29 kg/m²     General appearance: alert, appears stated age, cooperative and no distress  Head: Normocephalic, without obvious abnormality, atraumatic  Eyes: conjunctivae/corneas clear. PERRL, EOM's intact.    Ears: normal external ears  Neck: no adenopathy, no carotid bruit, no JVD, supple, symmetrical, trachea midline and thyroid not enlarged, symmetric, no tenderness/mass/nodules  Lungs: clear to auscultation bilaterally,no rales or wheezes   Heart: regular rate and rhythm, S1, S2 normal, no murmur,  regular rate and rhythm   Abdomen:soft, non-tender; non-distended normal bowel sounds no masses, no organomegaly  Extremities:Pedal edema none  Homans sign is negative, no sign of DVT  Lymphatic: No palpable lymph node enlargment  Neurologic: Alert and oriented X 3,   Psychiatric:  Alert and oriented, thought content appropriate, normal insight, mood appropriate  Skin: sacral wound    Post Debridement Measurements symptoms were discussed with the patient. Medications for discharge discussed, and adverse effects reviewed. Questions invited and answered. Patient shows understanding of the discharge information and agrees to follow-up. Discharge Instructions       Visit Discharge/Physician Orders    Discharge condition: Stable    Discharge to: Home    Left via:Private automobile    Accompanied by:  self    ECF/HHA: [facility] Wilson      Dressing Orders: Coccyx: Apply crusting to periwound or REFUGIO cream of barrier cream to periwound -irritated area. Wash with soap and water. Cover with 1/4 STR DAKINS moistened ROLLED GAUZE to wound bed. Cover with ABD. Secure with hytape. Change twice daily .     Apply Tinactin to both feet twice daily.      Treatment Orders:   Avoid Pressure to wound site. Turn frequently (turn at least every two hours when in bed)  No sitting in chair.      Off-load heels by applying heel-lift boots or \"float\" heels by placing pillows under calves so that heels do not touch mattress.     Continue Protein rich diet (unless restricted by your physician). Please restart Protein supplements.     Take Multivitamin daily     Please use low air loss bed    [facility] followup visit ____3 weeks_________________________  (Please note your next appointment above and if you are unable to keep, kindly give a 24 hour notice. Thank you.)          If you experience any of the following, please call the [address] [place] [address] during business hours:    * Increase in Pain  * Temperature over 101  * Increase in drainage from your wound  * Drainage with a foul odor  * Bleeding  * Increase in swelling  * Need for compression bandage changes due to slippage, breakthrough drainage. If you need medical attention outside of the business hours of the [facility] please contact your PCP or go to the nearest emergency room.

## 2018-10-16 ENCOUNTER — HOSPITAL ENCOUNTER (OUTPATIENT)
Dept: WOUND CARE | Age: 78
Discharge: HOME | End: 2018-10-16
Payer: MEDICARE

## 2018-10-16 VITALS
BODY MASS INDEX: 22.06 KG/M2 | SYSTOLIC BLOOD PRESSURE: 116 MMHG | WEIGHT: 145.06 LBS | HEART RATE: 71 BPM | TEMPERATURE: 98.6 F | DIASTOLIC BLOOD PRESSURE: 67 MMHG

## 2018-10-16 PROCEDURE — 97597 DBRDMT OPN WND 1ST 20 CM/<: CPT | Performed by: SURGERY

## 2018-10-16 PROCEDURE — 97597 DBRDMT OPN WND 1ST 20 CM/<: CPT

## 2018-10-16 ASSESSMENT — PAIN SCALES - GENERAL: PAINLEVEL_OUTOF10: 0

## 2018-10-16 NOTE — PROGRESS NOTES
% (l*w) 50.89 10/16/2018  3:13 PM   Closure None 10/16/2018  2:46 PM   Dressing Status Old drainage 10/16/2018  2:46 PM   Dressing Changed Changed/New 8/28/2018  2:35 PM   Dressing/Treatment Moist to moist;ABD;4x4 8/28/2018  2:35 PM   Wound Cleansed Rinsed/Irrigated with saline 10/16/2018  2:46 PM   Necrotic Type Yellow Fibrin/Slough 10/16/2018  2:46 PM   Necrotic Amount Small: 1-33% 10/16/2018  2:46 PM   Granulation Quality Red 10/16/2018  2:46 PM   Drainage Amount Moderate 10/16/2018  2:46 PM   Drainage Description Serosanguinous 10/16/2018  2:46 PM   Odor None 10/16/2018  2:46 PM   Epithelialization None present 10/16/2018  2:46 PM   Distance Tunneling (cm) 0 cm 10/16/2018  2:46 PM   Tunneling Position ___ O'Clock 0 10/16/2018  2:46 PM   Tunneling Maxium Distance (cm) 0 10/16/2018  2:46 PM   Undermining Starts ___ O'Clock 1 10/16/2018  2:46 PM   Undermining Ends___ O'Clock 4 10/16/2018  2:46 PM   Undermining Maxium Distance (cm) 1.8 10/16/2018  2:46 PM   [place]%Wound Bed 0 10/16/2018  2:46 PM   Red%Wound Bed 95 10/16/2018  2:46 PM   Yellow%Wound Bed 5 10/16/2018  2:46 PM   Black%Wound Bed 0 10/16/2018  2:46 PM   Purple%Wound Bed 0 10/16/2018  2:46 PM   Margins Unattached edges 10/16/2018  2:46 PM   Exposed structure Muscle 10/16/2018  2:46 PM   Debridement per physician Partial thickness 10/16/2018  3:13 PM   Time out Yes 10/16/2018  3:13 PM   Procedural Pain 0 10/16/2018  3:13 PM   Post procedural Pain 0 10/16/2018  3:13 PM   Number of days: 369      The patients pain isPain Level: 0  . Wound is has slightly improved. Please refer to nursing measurements and assessment regarding wound pre and post debridement. Procedure Note:    Wound #: 1     Debridement: Non-excisional Debridement    Anesthetic: Anesthetic: 2% Lidocaine Gel Topical  Using curette the wound was sharply debrided    down through and including the removal of epidermis and dermis.       Total Surface Area Debrided:  8.84 sq cm   Devitalized

## 2018-10-30 ENCOUNTER — HOSPITAL ENCOUNTER (OUTPATIENT)
Dept: WOUND CARE | Age: 78
Discharge: HOME | End: 2018-10-30
Payer: MEDICARE

## 2018-10-30 VITALS
WEIGHT: 145 LBS | TEMPERATURE: 98.2 F | HEART RATE: 68 BPM | BODY MASS INDEX: 21.98 KG/M2 | DIASTOLIC BLOOD PRESSURE: 54 MMHG | HEIGHT: 68 IN | RESPIRATION RATE: 16 BRPM | SYSTOLIC BLOOD PRESSURE: 108 MMHG

## 2018-10-30 PROCEDURE — 97597 DBRDMT OPN WND 1ST 20 CM/<: CPT

## 2018-10-30 PROCEDURE — 97597 DBRDMT OPN WND 1ST 20 CM/<: CPT | Performed by: SURGERY

## 2018-10-30 ASSESSMENT — PAIN DESCRIPTION - LOCATION: LOCATION: COCCYX

## 2018-10-30 ASSESSMENT — PAIN DESCRIPTION - FREQUENCY: FREQUENCY: INTERMITTENT

## 2018-10-30 ASSESSMENT — PAIN DESCRIPTION - PROGRESSION: CLINICAL_PROGRESSION: NOT CHANGED

## 2018-10-30 ASSESSMENT — PAIN DESCRIPTION - PAIN TYPE: TYPE: ACUTE PAIN

## 2018-10-30 ASSESSMENT — PAIN DESCRIPTION - ONSET: ONSET: ON-GOING

## 2018-10-30 ASSESSMENT — PAIN SCALES - GENERAL: PAINLEVEL_OUTOF10: 4

## 2018-10-30 ASSESSMENT — PAIN DESCRIPTION - DESCRIPTORS: DESCRIPTORS: TENDER;SORE

## 2018-10-30 NOTE — PROGRESS NOTES
[facility] Care Center   Progress Note and Procedure Note      Carrillo Pham  AGE: 77 y.o. GENDER: female  : 1940  TODAY'S DATE:  10/30/2018    Subjective:        HISTORY of PRESENT ILLNESS HPI   Carrillo Pham is a 77 y.o. female who presents today for wound evaluation. Wound Type:pressure  Wound Location:sacral  Modifying factors:chronic pressure, decreased mobility, shear force, malnutrition and incontinence of stool    Patient Active Problem List   Diagnosis Code    SA node dysfunction ([facility]) I49.5    Pacemaker S02.1    Diastolic dysfunction G18.9    Decubitus ulcer of sacral region, stage 4 ([facility]) L89.154    Debilitated R53.81    Malnutrition ([facility]) E46    H/O back injury Z87.828    Incontinence of feces R15.9    Severe protein-calorie malnutrition ([facility]) E43    Other osteomyelitis, other site ([facility]) M86.8X8       ALLERGIES    Allergies   Allergen Reactions    Morphine Sulfate [Morphine] Anaphylaxis     Pt states she quit breathing      Ciprocinonide [Fluocinolone]     Codeine Phosphate [Codeine] Nausea Only    Quinolones            Objective:      BP (!) 108/54   Pulse 68   Temp 98.2 °F (36.8 °C) (Temporal)   Resp 16   Ht 5' 8\" (1.727 m)   Wt 145 lb (65.8 kg)   BMI 22.05 kg/m²     Post Debridement Measurements and Assessment:  Pressure Ulcer 10/12/17 Coccyx Mid Wound 1: Coccyx (Pressure Stage 4) (Active)   Sabine-wound Assessment Dry; Intact 10/30/2018  1:35 PM   Sabine-Wound Texture Scarring 10/30/2018  1:35 PM   Sabine-Wound Moisture Dry 10/30/2018  1:35 PM   Sabine-Wound Color Pink 10/30/2018  1:35 PM   Pressure Ulcer Staging Stage IV 10/16/2018  2:46 PM   Non-staged Wound Description Not applicable   4:60 PM   Wound Assessment Red;Pink;Slough; Yellow;Drainage;Granulation tissue 2018  2:39 PM   Shape irregular` 2018  2:39 PM   Wound Length (cm) 3.8 cm 10/30/2018  2:04 PM   Wound Width (cm) 3 cm 10/30/2018  2:04 PM   Wound Depth (cm)  2.5 10/30/2018  2:04 PM   Calculated Wound

## 2018-11-20 ENCOUNTER — HOSPITAL ENCOUNTER (OUTPATIENT)
Dept: WOUND CARE | Age: 78
Discharge: HOME | End: 2018-11-20
Payer: MEDICARE

## 2018-11-20 VITALS
DIASTOLIC BLOOD PRESSURE: 70 MMHG | RESPIRATION RATE: 16 BRPM | HEIGHT: 68 IN | WEIGHT: 150.57 LBS | TEMPERATURE: 97.7 F | HEART RATE: 72 BPM | BODY MASS INDEX: 22.82 KG/M2 | SYSTOLIC BLOOD PRESSURE: 112 MMHG

## 2018-11-20 PROCEDURE — 97597 DBRDMT OPN WND 1ST 20 CM/<: CPT

## 2018-11-20 PROCEDURE — 97597 DBRDMT OPN WND 1ST 20 CM/<: CPT | Performed by: SURGERY

## 2018-11-20 ASSESSMENT — PAIN DESCRIPTION - FREQUENCY: FREQUENCY: CONTINUOUS

## 2018-11-20 ASSESSMENT — PAIN DESCRIPTION - ONSET: ONSET: ON-GOING

## 2018-11-20 ASSESSMENT — PAIN DESCRIPTION - PROGRESSION: CLINICAL_PROGRESSION: NOT CHANGED

## 2018-11-20 ASSESSMENT — PAIN DESCRIPTION - DESCRIPTORS: DESCRIPTORS: ACHING

## 2018-11-20 ASSESSMENT — PAIN DESCRIPTION - LOCATION: LOCATION: BACK

## 2018-11-20 ASSESSMENT — PAIN SCALES - GENERAL: PAINLEVEL_OUTOF10: 6

## 2018-11-20 ASSESSMENT — PAIN DESCRIPTION - PAIN TYPE: TYPE: CHRONIC PAIN

## 2018-11-21 ENCOUNTER — TELEPHONE (OUTPATIENT)
Dept: CARDIOLOGY | Age: 78
End: 2018-11-21

## 2018-12-04 ENCOUNTER — HOSPITAL ENCOUNTER (OUTPATIENT)
Dept: WOUND CARE | Age: 78
Discharge: HOME | End: 2018-12-04
Payer: MEDICARE

## 2018-12-04 VITALS
TEMPERATURE: 97.8 F | HEIGHT: 68 IN | WEIGHT: 150.5 LBS | SYSTOLIC BLOOD PRESSURE: 100 MMHG | RESPIRATION RATE: 16 BRPM | BODY MASS INDEX: 22.81 KG/M2 | DIASTOLIC BLOOD PRESSURE: 51 MMHG | HEART RATE: 41 BPM

## 2018-12-04 PROCEDURE — 97597 DBRDMT OPN WND 1ST 20 CM/<: CPT | Performed by: SURGERY

## 2018-12-04 PROCEDURE — 97597 DBRDMT OPN WND 1ST 20 CM/<: CPT

## 2018-12-04 RX ORDER — LANOLIN ALCOHOL/MO/W.PET/CERES
4 CREAM (GRAM) TOPICAL NIGHTLY PRN
COMMUNITY
End: 2019-07-02 | Stop reason: DRUGHIGH

## 2018-12-04 ASSESSMENT — PAIN DESCRIPTION - ONSET: ONSET: ON-GOING

## 2018-12-04 ASSESSMENT — PAIN DESCRIPTION - FREQUENCY: FREQUENCY: CONTINUOUS

## 2018-12-04 ASSESSMENT — PAIN DESCRIPTION - DESCRIPTORS: DESCRIPTORS: DULL;ACHING

## 2018-12-04 ASSESSMENT — PAIN DESCRIPTION - PAIN TYPE: TYPE: CHRONIC PAIN

## 2018-12-04 ASSESSMENT — PAIN DESCRIPTION - LOCATION: LOCATION: BACK

## 2018-12-04 ASSESSMENT — PAIN SCALES - GENERAL: PAINLEVEL_OUTOF10: 3

## 2018-12-04 NOTE — PROGRESS NOTES
PM   Post-Procedure Surface Area (cm^2) 5.7 cm^2 12/4/2018  1:57 PM   Post-Procedure Volume (cm^3) 14.82 cm^3 12/4/2018  1:57 PM   Distance Tunneling (cm) 0 cm 12/4/2018  1:24 PM   Tunneling Position ___ O'Clock 0 12/4/2018  1:24 PM   Undermining Starts ___ O'Clock 0 12/4/2018  1:24 PM   Undermining Ends___ O'Clock 0 12/4/2018  1:24 PM   Undermining Maxium Distance (cm) 0 12/4/2018  1:24 PM   Wound Assessment Drainage;Red;Yellow;Granulation tissue;Slough 12/4/2018  1:24 PM   Drainage Amount Moderate 12/4/2018  1:24 PM   Drainage Description Serosanguinous 12/4/2018  1:24 PM   Odor None 12/4/2018  1:24 PM   Margins Attached edges 12/4/2018  1:24 PM   Exposed structure Muscle 12/4/2018  1:24 PM   Sabine-wound Assessment Dry; Intact 12/4/2018  1:24 PM   Non-staged Wound Description Not applicable 98/1/1071  4:16 PM   [place]%Wound Bed 0 12/4/2018  1:24 PM   Red%Wound Bed 95 12/4/2018  1:24 PM   Yellow%Wound Bed 5 12/4/2018  1:24 PM   Black%Wound Bed 0 12/4/2018  1:24 PM   Purple%Wound Bed 0 12/4/2018  1:24 PM   Other%Wound Bed 0 12/4/2018  1:24 PM   Number of days: 418        The patients pain isPain Level: 3 Pain Type: Chronic pain. Wound is has slightly improved. Please refer to nursing measurements and assessment regarding wound pre and post debridement. Procedure Note:    Wound #: 1     Debridement: Non-excisional Debridement    Anesthetic: Anesthetic: 2% Lidocaine Gel Topical  Using curette the wound was sharply debrided    down through and including the removal of epidermis and dermis. Total Surface Area Debrided:  5.7 sq cm   Devitalized Tissue Debrided:  fibrin, biofilm and slough    Percent of Wound Debrided: 100%      Bleeding: Minimal    Hemostasis:   not needed      Response to treatment:  Well tolerated by patient.       Assessment:      Patient Active Problem List   Diagnosis    [facility] dysfunction ([facility]    Pacemaker    Diastolic dysfunction    [facility], [facility]    Debilitated    Malnutrition ([facility]    H/O back injury    Incontinence of feces    Severe protein-calorie malnutrition ([facility]    Other osteomyelitis, other site ([facility]          Plan:          Plan for wound - Dress per physician order  Treatment:     Compression : No   Offloading : Yes   Dressing : see AVS   Additional Therapy : none     1. Discussed appropriate home care of this wound. Wound redressed. 2. Patient instructions were given. 3. Follow up: 2 week(s). Ms. Sabrina Prince wound is just a little smaller. She did have quite a bit of hydrofera blue stuffed in the wound. We will send a message to the nursing home to put less in the wound. Continue offloading. Follow up in 2 weeks.     Electronically signed by Sha Ring MD on 12/4/2018 at 2:12 PM

## 2018-12-07 DIAGNOSIS — I49.5 SA NODE DYSFUNCTION (HCC): ICD-10-CM

## 2018-12-07 DIAGNOSIS — Z95.0 PACEMAKER: Primary | ICD-10-CM

## 2018-12-07 PROCEDURE — 93296 REM INTERROG EVL PM/IDS: CPT | Performed by: CLINICAL NURSE SPECIALIST

## 2018-12-07 PROCEDURE — 93294 REM INTERROG EVL PM/LDLS PM: CPT | Performed by: CLINICAL NURSE SPECIALIST

## 2018-12-18 ENCOUNTER — HOSPITAL ENCOUNTER (OUTPATIENT)
Dept: WOUND CARE | Age: 78
Discharge: HOME | End: 2018-12-18
Payer: MEDICARE

## 2018-12-18 VITALS
SYSTOLIC BLOOD PRESSURE: 116 MMHG | HEART RATE: 70 BPM | TEMPERATURE: 98.1 F | DIASTOLIC BLOOD PRESSURE: 64 MMHG | HEIGHT: 68 IN | BODY MASS INDEX: 22.73 KG/M2 | WEIGHT: 150 LBS | RESPIRATION RATE: 14 BRPM

## 2018-12-18 PROCEDURE — 97597 DBRDMT OPN WND 1ST 20 CM/<: CPT

## 2018-12-18 PROCEDURE — 97597 DBRDMT OPN WND 1ST 20 CM/<: CPT | Performed by: SURGERY

## 2018-12-18 ASSESSMENT — PAIN SCALES - GENERAL: PAINLEVEL_OUTOF10: 3

## 2018-12-18 ASSESSMENT — PAIN DESCRIPTION - ORIENTATION: ORIENTATION: LOWER

## 2018-12-18 ASSESSMENT — PAIN DESCRIPTION - LOCATION: LOCATION: BACK

## 2018-12-18 ASSESSMENT — PAIN DESCRIPTION - ONSET: ONSET: ON-GOING

## 2018-12-18 ASSESSMENT — PAIN DESCRIPTION - PAIN TYPE: TYPE: CHRONIC PAIN

## 2018-12-18 ASSESSMENT — PAIN DESCRIPTION - FREQUENCY: FREQUENCY: CONTINUOUS

## 2018-12-18 ASSESSMENT — PAIN DESCRIPTION - DESCRIPTORS: DESCRIPTORS: ACHING

## 2018-12-18 NOTE — PROGRESS NOTES
[facility] Center   Progress Note and Procedure Note      Hiral Osorio  AGE: 77 y.o. GENDER: female  : 1940  TODAY'S DATE:  2018    Subjective:        HISTORY of PRESENT ILLNESS HPI   Hiral Osorio is a 77 y.o. female who presents today for wound evaluation.     Wound Type:pressure  Wound Location:sacral  Modifying factors:decreased mobility and shear force    Patient Active Problem List   Diagnosis Code    SA node dysfunction ([facility] I49.5    Pacemaker U45.9    Diastolic dysfunction D43.9    Decubitus ulcer of sacral region, stage 4 (HCC) L89.154    Debilitated R53.81    Malnutrition ([address] E46    H/O back injury Z87.828    Incontinence of feces R15.9    Severe protein-calorie malnutrition ([facility] E43    Other osteomyelitis, other site ([facility] M86.8X8       ALLERGIES    Allergies   Allergen Reactions    Morphine Sulfate [Morphine] Anaphylaxis     Pt states she quit breathing      Ciprocinonide [Fluocinolone]     Codeine Phosphate [Codeine] Nausea Only    Quinolones             Objective:      /64   Pulse 70   Temp 98.1 °F (36.7 °C) (Temporal)   Resp 14   Ht 5' 8\" (1.727 m)   Wt 150 lb (68 kg)   BMI 22.81 kg/m²     Post Debridement Measurements and Assessment:  Wound 10/12/17 Coccyx Wound 1, Pressure Stage 4, Coccyx (Active)   Wound Image   2018  1:45 PM   Wound Pressure Stage  4 2018  1:45 PM   Dressing Status Old drainage 2018  1:45 PM   Wound Cleansed Rinsed/Irrigated with saline 2018  1:45 PM   Wound Length (cm) 2.3 cm 2018  1:45 PM   Wound Width (cm) 2.5 cm 2018  1:45 PM   Wound Depth (cm) 2 cm 2018  1:45 PM   Wound Surface Area (cm^2) 5.75 cm^2 2018  1:45 PM   Change in Wound Size % (l*w) -0.88 2018  1:45 PM   Wound Volume (cm^3) 11.5 cm^3 2018  1:45 PM   Wound Healing % 22 2018  1:45 PM   Post-Procedure Length (cm) 2.3 cm 2018  2:10 PM   Post-Procedure Width (cm) 2.5 cm 2018  2:10 PM Post-Procedure Depth (cm) 2 cm 12/18/2018  2:10 PM   Post-Procedure Surface Area (cm^2) 5.75 cm^2 12/18/2018  2:10 PM   Post-Procedure Volume (cm^3) 11.5 cm^3 12/18/2018  2:10 PM   Distance Tunneling (cm) 0 cm 12/18/2018  1:45 PM   Tunneling Position ___ O'Clock 0 12/18/2018  1:45 PM   Undermining Starts ___ O'Clock 0 12/18/2018  1:45 PM   Undermining Ends___ O'Clock 0 12/18/2018  1:45 PM   Undermining Maxium Distance (cm) 0 12/18/2018  1:45 PM   Wound Assessment Drainage;Red;Yellow;Granulation tissue;Slough 12/18/2018  1:45 PM   Drainage Amount Moderate 12/18/2018  1:45 PM   Drainage Description Serosanguinous 12/18/2018  1:45 PM   Odor None 12/18/2018  1:45 PM   Margins Attached edges 12/18/2018  1:45 PM   Exposed structure Muscle 12/18/2018  1:45 PM   Sabine-wound Assessment Dry; Intact 12/18/2018  1:45 PM   Non-staged Wound Description Not applicable 72/21/2335  5:63 PM   [place]%Wound Bed 0 12/18/2018  1:45 PM   Red%Wound Bed 90 12/18/2018  1:45 PM   Yellow%Wound Bed 10 12/18/2018  1:45 PM   Black%Wound Bed 0 12/18/2018  1:45 PM   Purple%Wound Bed 0 12/18/2018  1:45 PM   Other%Wound Bed 0 12/18/2018  1:45 PM   Number of days: 432        The patients pain isPain Level: 3 Pain Type: Chronic pain. Wound is has slightly improved. Please refer to nursing measurements and assessment regarding wound pre and post debridement. Procedure Note:    Wound #: 4     Debridement: Non-excisional Debridement    Anesthetic: Anesthetic: 2% Lidocaine Gel Topical  Using curette the wound was sharply debrided    down through and including the removal of epidermis and dermis. Total Surface Area Debrided:  5.75 sq cm   Devitalized Tissue Debrided:  fibrin and biofilm    Percent of Wound Debrided: 100%      Bleeding: Minimal    Hemostasis:   not needed      Response to treatment:  Well tolerated by patient.       Assessment:      Patient Active Problem List   Diagnosis    SA node dysfunction ([address]    Pacemaker    Diastolic dysfunction    Decubitus ulcer of sacral region, stage 4 (HCC)    Debilitated    Malnutrition ([address]    H/O back injury    Incontinence of feces    Severe protein-calorie malnutrition ([address]    Other osteomyelitis, other site ([address]          Plan:          Plan for wound - Dress per physician order  Treatment:     Compression : No   Offloading : Yes   Dressing : see AVS   Additional Therapy : none     1. Discussed appropriate home care of this wound. Wound redressed. 2. Patient instructions were given. 3. Follow up: 4 week(s). Ms. Catie Hendrix wound is slightly smaller. We will continue using hydrofera blue for now and monitor for continued improvement. Continue with offloading. Follow up in 4 weeks.     Electronically signed by Greg Bhagat MD on 12/18/2018 at 2:21 PM

## 2019-01-15 ENCOUNTER — HOSPITAL ENCOUNTER (OUTPATIENT)
Dept: WOUND CARE | Age: 79
Discharge: HOME | End: 2019-01-15
Payer: MEDICARE

## 2019-01-15 VITALS
HEART RATE: 69 BPM | BODY MASS INDEX: 23.58 KG/M2 | DIASTOLIC BLOOD PRESSURE: 66 MMHG | SYSTOLIC BLOOD PRESSURE: 118 MMHG | HEIGHT: 68 IN | WEIGHT: 155.56 LBS | TEMPERATURE: 98.6 F

## 2019-01-15 PROCEDURE — 97597 DBRDMT OPN WND 1ST 20 CM/<: CPT

## 2019-01-15 PROCEDURE — 97597 DBRDMT OPN WND 1ST 20 CM/<: CPT | Performed by: SURGERY

## 2019-01-15 ASSESSMENT — PAIN DESCRIPTION - PAIN TYPE: TYPE: CHRONIC PAIN

## 2019-01-15 ASSESSMENT — PAIN DESCRIPTION - DESCRIPTORS: DESCRIPTORS: ACHING

## 2019-01-15 ASSESSMENT — PAIN DESCRIPTION - LOCATION: LOCATION: BACK

## 2019-01-15 ASSESSMENT — PAIN DESCRIPTION - FREQUENCY: FREQUENCY: CONTINUOUS

## 2019-01-15 ASSESSMENT — PAIN DESCRIPTION - ONSET: ONSET: ON-GOING

## 2019-01-15 ASSESSMENT — PAIN DESCRIPTION - PROGRESSION: CLINICAL_PROGRESSION: NOT CHANGED

## 2019-01-15 ASSESSMENT — PAIN SCALES - GENERAL: PAINLEVEL_OUTOF10: 1

## 2019-02-05 ENCOUNTER — HOSPITAL ENCOUNTER (OUTPATIENT)
Dept: WOUND CARE | Age: 79
Discharge: HOME | End: 2019-02-05
Payer: MEDICARE

## 2019-02-05 VITALS
SYSTOLIC BLOOD PRESSURE: 114 MMHG | BODY MASS INDEX: 23.49 KG/M2 | WEIGHT: 155 LBS | HEIGHT: 68 IN | TEMPERATURE: 98 F | DIASTOLIC BLOOD PRESSURE: 60 MMHG | RESPIRATION RATE: 16 BRPM | HEART RATE: 66 BPM

## 2019-02-05 PROCEDURE — 97597 DBRDMT OPN WND 1ST 20 CM/<: CPT | Performed by: SURGERY

## 2019-02-05 PROCEDURE — 97597 DBRDMT OPN WND 1ST 20 CM/<: CPT

## 2019-02-26 ENCOUNTER — HOSPITAL ENCOUNTER (OUTPATIENT)
Dept: WOUND CARE | Age: 79
Discharge: HOME | End: 2019-02-26
Payer: MEDICARE

## 2019-02-26 VITALS
WEIGHT: 155 LBS | HEART RATE: 68 BPM | SYSTOLIC BLOOD PRESSURE: 116 MMHG | BODY MASS INDEX: 23.49 KG/M2 | HEIGHT: 68 IN | DIASTOLIC BLOOD PRESSURE: 64 MMHG | RESPIRATION RATE: 16 BRPM | TEMPERATURE: 98 F

## 2019-02-26 PROCEDURE — 97597 DBRDMT OPN WND 1ST 20 CM/<: CPT | Performed by: SURGERY

## 2019-02-26 PROCEDURE — 97597 DBRDMT OPN WND 1ST 20 CM/<: CPT

## 2019-02-26 ASSESSMENT — PAIN SCALES - GENERAL: PAINLEVEL_OUTOF10: 0

## 2019-03-12 ENCOUNTER — HOSPITAL ENCOUNTER (OUTPATIENT)
Dept: WOUND CARE | Age: 79
Discharge: HOME | End: 2019-03-12
Payer: MEDICARE

## 2019-03-12 VITALS
SYSTOLIC BLOOD PRESSURE: 111 MMHG | WEIGHT: 155 LBS | DIASTOLIC BLOOD PRESSURE: 69 MMHG | RESPIRATION RATE: 14 BRPM | TEMPERATURE: 98.2 F | HEIGHT: 68 IN | BODY MASS INDEX: 23.49 KG/M2 | HEART RATE: 42 BPM

## 2019-03-12 PROCEDURE — 97597 DBRDMT OPN WND 1ST 20 CM/<: CPT | Performed by: SURGERY

## 2019-03-12 PROCEDURE — 97597 DBRDMT OPN WND 1ST 20 CM/<: CPT

## 2019-03-12 ASSESSMENT — PAIN SCALES - GENERAL: PAINLEVEL_OUTOF10: 0

## 2019-03-19 ENCOUNTER — OFFICE VISIT (OUTPATIENT)
Dept: CARDIOLOGY | Age: 79
End: 2019-03-19
Payer: MEDICARE

## 2019-03-19 VITALS
HEIGHT: 67 IN | SYSTOLIC BLOOD PRESSURE: 110 MMHG | HEART RATE: 70 BPM | DIASTOLIC BLOOD PRESSURE: 62 MMHG | BODY MASS INDEX: 24.96 KG/M2 | WEIGHT: 159 LBS

## 2019-03-19 DIAGNOSIS — Z95.0 PACEMAKER: Primary | ICD-10-CM

## 2019-03-19 DIAGNOSIS — I51.89 DIASTOLIC DYSFUNCTION: ICD-10-CM

## 2019-03-19 DIAGNOSIS — I49.5 SA NODE DYSFUNCTION (HCC): ICD-10-CM

## 2019-03-19 PROCEDURE — G8427 DOCREV CUR MEDS BY ELIG CLIN: HCPCS | Performed by: CLINICAL NURSE SPECIALIST

## 2019-03-19 PROCEDURE — 1101F PT FALLS ASSESS-DOCD LE1/YR: CPT | Performed by: CLINICAL NURSE SPECIALIST

## 2019-03-19 PROCEDURE — G8484 FLU IMMUNIZE NO ADMIN: HCPCS | Performed by: CLINICAL NURSE SPECIALIST

## 2019-03-19 PROCEDURE — 1090F PRES/ABSN URINE INCON ASSESS: CPT | Performed by: CLINICAL NURSE SPECIALIST

## 2019-03-19 PROCEDURE — 1036F TOBACCO NON-USER: CPT | Performed by: CLINICAL NURSE SPECIALIST

## 2019-03-19 PROCEDURE — 93288 INTERROG EVL PM/LDLS PM IP: CPT | Performed by: CLINICAL NURSE SPECIALIST

## 2019-03-19 PROCEDURE — G8420 CALC BMI NORM PARAMETERS: HCPCS | Performed by: CLINICAL NURSE SPECIALIST

## 2019-03-19 PROCEDURE — 4040F PNEUMOC VAC/ADMIN/RCVD: CPT | Performed by: CLINICAL NURSE SPECIALIST

## 2019-03-19 PROCEDURE — G8400 PT W/DXA NO RESULTS DOC: HCPCS | Performed by: CLINICAL NURSE SPECIALIST

## 2019-03-19 PROCEDURE — 99213 OFFICE O/P EST LOW 20 MIN: CPT | Performed by: CLINICAL NURSE SPECIALIST

## 2019-03-19 PROCEDURE — 1123F ACP DISCUSS/DSCN MKR DOCD: CPT | Performed by: CLINICAL NURSE SPECIALIST

## 2019-03-19 RX ORDER — HYDROXYZINE HYDROCHLORIDE 25 MG/1
25 TABLET, FILM COATED ORAL 3 TIMES DAILY PRN
COMMUNITY
End: 2019-10-29 | Stop reason: ALTCHOICE

## 2019-04-02 ENCOUNTER — HOSPITAL ENCOUNTER (OUTPATIENT)
Dept: WOUND CARE | Age: 79
Discharge: HOME | End: 2019-04-02
Payer: MEDICARE

## 2019-04-02 VITALS
WEIGHT: 159 LBS | HEIGHT: 67 IN | HEART RATE: 82 BPM | SYSTOLIC BLOOD PRESSURE: 97 MMHG | TEMPERATURE: 98.7 F | RESPIRATION RATE: 16 BRPM | DIASTOLIC BLOOD PRESSURE: 57 MMHG | BODY MASS INDEX: 24.96 KG/M2

## 2019-04-02 DIAGNOSIS — L72.9 SCALP CYST: Chronic | ICD-10-CM

## 2019-04-02 PROCEDURE — 97597 DBRDMT OPN WND 1ST 20 CM/<: CPT

## 2019-04-02 PROCEDURE — 99213 OFFICE O/P EST LOW 20 MIN: CPT

## 2019-04-02 PROCEDURE — 97597 DBRDMT OPN WND 1ST 20 CM/<: CPT | Performed by: SURGERY

## 2019-04-02 PROCEDURE — 10060 I&D ABSCESS SIMPLE/SINGLE: CPT | Performed by: SURGERY

## 2019-04-02 PROCEDURE — 10060 I&D ABSCESS SIMPLE/SINGLE: CPT

## 2019-04-02 RX ORDER — AMOXICILLIN AND CLAVULANATE POTASSIUM 875; 125 MG/1; MG/1
1 TABLET, FILM COATED ORAL 2 TIMES DAILY
Qty: 14 TABLET | Refills: 0 | Status: SHIPPED | OUTPATIENT
Start: 2019-04-02 | End: 2019-04-09

## 2019-04-02 ASSESSMENT — PAIN SCALES - GENERAL: PAINLEVEL_OUTOF10: 0

## 2019-04-02 NOTE — PROGRESS NOTES
Wound Care Center   Progress Note and Procedure Note      Agnieszka Dawson  AGE: 77 y.o. GENDER: female  : 1940  TODAY'S DATE:  2019    Subjective:        HISTORY of PRESENT ILLNESS HPI   Agnieszka Dawson is a 77 y.o. female who presents today for wound evaluation.     Wound Type:pressure  Wound Location:sacral  Modifying factors:chronic pressure, decreased mobility, shear force and malnutrition    Patient Active Problem List   Diagnosis Code    SA node dysfunction ([address] I49.5    Pacemaker N62.1    Diastolic dysfunction L15.3    Decubitus ulcer of sacral region, stage 4 ([facility]) L89.154    Debilitated R53.81    Malnutrition ([address] E46    H/O back injury Z87.828    Incontinence of feces R15.9    Severe protein-calorie malnutrition ([address] E43    Other osteomyelitis, other site ([address] M86.8X8    Scalp cyst L72.9       ALLERGIES    Allergies   Allergen Reactions    Morphine Sulfate [Morphine] Anaphylaxis     Pt states she quit breathing      Ciprocinonide [Fluocinolone]     Codeine Phosphate [Codeine] Nausea Only    Quinolones             Objective:      BP (!) 97/57   Pulse 82   Temp 98.7 °F (37.1 °C) (Temporal)   Resp 16   Ht 5' 7\" (1.702 m)   Wt 159 lb (72.1 kg)   BMI 24.90 kg/m²     Post Debridement Measurements and Assessment:  Wound 10/12/17 Coccyx Wound 1, Pressure Stage 4, Coccyx (Active)   Wound Image   2019  2:11 PM   Wound Pressure Stage  4 2019  2:11 PM   Dressing Status Old drainage 2019  2:11 PM   Dressing Changed Changed/New 3/12/2019  2:15 PM   Dressing/Treatment Silver Dressing;ABD 3/12/2019  2:15 PM   Wound Cleansed Rinsed/Irrigated with saline 2019  2:11 PM   Wound Length (cm) 2.2 cm 2019  2:11 PM   Wound Width (cm) 1.2 cm 2019  2:11 PM   Wound Depth (cm) 2 cm 2019  2:11 PM   Wound Surface Area (cm^2) 2.64 cm^2 2019  2:11 PM   Change in Wound Size % (l*w) 53.68 2019  2:11 PM   Wound Volume (cm^3) 5.28 cm^3 2019  2:11 PM   Wound Healing %

## 2019-04-02 NOTE — PLAN OF CARE
Problem: Wound:  Goal: Will show signs of wound healing; wound closure and no evidence of infection  Description  Will show signs of wound healing; wound closure and no evidence of infection   Outcome: Ongoing     Problem: Pressure Ulcer:  Goal: Signs of wound healing will improve  Description  Signs of wound healing will improve   Outcome: Ongoing  Goal: Absence of new pressure ulcer  Description  Absence of new pressure ulcer   Outcome: Ongoing  Goal: Will show no infection signs and symptoms  Description  Will show no infection signs and symptoms   Outcome: Ongoing

## 2019-04-16 ENCOUNTER — HOSPITAL ENCOUNTER (OUTPATIENT)
Dept: WOUND CARE | Age: 79
Discharge: HOME | End: 2019-04-16
Payer: MEDICARE

## 2019-04-16 VITALS
DIASTOLIC BLOOD PRESSURE: 65 MMHG | BODY MASS INDEX: 24.96 KG/M2 | HEIGHT: 67 IN | SYSTOLIC BLOOD PRESSURE: 109 MMHG | WEIGHT: 159 LBS | RESPIRATION RATE: 18 BRPM | TEMPERATURE: 98.5 F | HEART RATE: 64 BPM

## 2019-04-16 PROCEDURE — 97597 DBRDMT OPN WND 1ST 20 CM/<: CPT | Performed by: SURGERY

## 2019-04-16 PROCEDURE — 97597 DBRDMT OPN WND 1ST 20 CM/<: CPT

## 2019-04-16 ASSESSMENT — PAIN SCALES - GENERAL: PAINLEVEL_OUTOF10: 0

## 2019-04-16 NOTE — PROGRESS NOTES
1:42 PM   Wound Healing % 43 4/16/2019  1:42 PM   Post-Procedure Length (cm) 2.1 cm 4/16/2019  2:02 PM   Post-Procedure Width (cm) 1.5 cm 4/16/2019  2:02 PM   Post-Procedure Depth (cm) 2.7 cm 4/16/2019  2:02 PM   Post-Procedure Surface Area (cm^2) 3.15 cm^2 4/16/2019  2:02 PM   Post-Procedure Volume (cm^3) 8.51 cm^3 4/16/2019  2:02 PM   Distance Tunneling (cm) 0 cm 4/16/2019  1:42 PM   Tunneling Position ___ O'Clock 0 4/16/2019  1:42 PM   Undermining Starts ___ O'Clock 1 4/16/2019  1:42 PM   Undermining Ends___ O'Clock 4 4/16/2019  1:42 PM   Undermining Maxium Distance (cm) 2.2 4/16/2019  1:42 PM   Wound Assessment Granulation tissue;Drainage;Red;Slough; Yellow 4/16/2019  1:42 PM   Drainage Amount Moderate 4/16/2019  1:42 PM   Drainage Description Serosanguinous 4/16/2019  1:42 PM   Odor None 4/16/2019  1:42 PM   Margins Attached edges 4/16/2019  1:42 PM   Exposed structure Muscle 4/16/2019  1:42 PM   Sabine-wound Assessment Dry; Intact 4/16/2019  1:42 PM   Non-staged Wound Description Not applicable 8/72/9233  7:54 PM   [place]%Wound Bed 75 4/16/2019  1:42 PM   Red%Wound Bed 0 4/16/2019  1:42 PM   Yellow%Wound Bed 25 4/16/2019  1:42 PM   Black%Wound Bed 0 4/16/2019  1:42 PM   Purple%Wound Bed 0 4/16/2019  1:42 PM   Other%Wound Bed 0 4/16/2019  1:42 PM   Number of days: 551        The patients pain isPain Level: 0  . Wound(s) is unchanged. Please refer to nursing measurements and assessment regarding wound pre and post debridement. Procedure Note:    Wound #: 1     Debridement: Non-excisional Debridement    Anesthetic: Anesthetic: 2% Lidocaine Gel Topical  Using curette the wound was sharply debrided    down through and including the removal of epidermis and dermis.       Total Surface Area Debrided:  3.15 sq cm   Devitalized Tissue Debrided:  fibrin and biofilm    Percent of Wound Debrided: 100%      Bleeding: Minimal    Hemostasis:   by pressure      Response to treatment:  Well tolerated by patient. Assessment:      Patient Active Problem List   Diagnosis    SA node dysfunction ([address]    Pacemaker    Diastolic dysfunction    Decubitus ulcer of sacral region, stage 4 ([address]    Debilitated    Malnutrition ([address]    H/O back injury    Incontinence of feces    Severe protein-calorie malnutrition ([address]    Other osteomyelitis, other site ([address]    Scalp cyst          Plan:          Plan for wound - Dress per physician order  Treatment:     Compression : No   Offloading : Yes   Dressing : see AVS   Additional Therapy : none     1. Discussed appropriate home care of this wound. Wound redressed. 2. Patient instructions were given. 3. Follow up: 3 week(s). Ms. Heidi Mckeon wound is stable, no noticeable changes. Her scalp abscess is healed. Continue with dressing changes, protein, and offloading.     Electronically signed by Kushal Cabrera MD on 4/16/2019 at 2:05 PM

## 2019-05-07 ENCOUNTER — HOSPITAL ENCOUNTER (OUTPATIENT)
Dept: WOUND CARE | Age: 79
Discharge: HOME | End: 2019-05-07
Payer: MEDICARE

## 2019-05-07 VITALS
SYSTOLIC BLOOD PRESSURE: 105 MMHG | DIASTOLIC BLOOD PRESSURE: 65 MMHG | BODY MASS INDEX: 25.65 KG/M2 | TEMPERATURE: 98.2 F | RESPIRATION RATE: 18 BRPM | HEIGHT: 67 IN | HEART RATE: 81 BPM | WEIGHT: 163.44 LBS

## 2019-05-07 PROCEDURE — 97597 DBRDMT OPN WND 1ST 20 CM/<: CPT | Performed by: SURGERY

## 2019-05-07 PROCEDURE — 97597 DBRDMT OPN WND 1ST 20 CM/<: CPT

## 2019-05-07 RX ORDER — MENTHOL AND ZINC OXIDE .44; 20.625 G/100G; G/100G
OINTMENT TOPICAL DAILY
COMMUNITY

## 2019-05-07 ASSESSMENT — PAIN SCALES - GENERAL: PAINLEVEL_OUTOF10: 0

## 2019-05-07 NOTE — PROGRESS NOTES
Wound Care Center   Progress Note and Procedure Note      Buzz Colmenares  AGE: 77 y.o. GENDER: female  : 1940  TODAY'S DATE:  2019    Subjective:        HISTORY of PRESENT ILLNESS HPI   Buzz Colmenares is a 77 y.o. female who presents today for wound/ulcer evaluation. Ulcer Type:pressure  Ulcer/Wound Location:sacral  Contributing factors:chronic pressure, decreased mobility, shear force, malnutrition and incontinence of stool    Patient Active Problem List   Diagnosis Code    SA node dysfunction ([facility]) I49.5    Pacemaker U28.1    Diastolic dysfunction P96.1    Decubitus ulcer of sacral region, stage 4 ([facility]) L89.154    Debilitated R53.81    Malnutrition ([facility]) E46    H/O back injury Z87.828    Incontinence of feces R15.9    Severe protein-calorie malnutrition ([facility]) E43    Other osteomyelitis, other site ([facility]) M86.8X8    Scalp cyst L72.9       ALLERGIES    Allergies   Allergen Reactions    Morphine Sulfate [Morphine] Anaphylaxis     Pt states she quit breathing      Ciprocinonide [Fluocinolone]     Codeine Phosphate [Codeine] Nausea Only    Quinolones            Objective:      /65   Pulse 81   Temp 98.2 °F (36.8 °C) (Temporal)   Resp 18   Ht 5' 7\" (1.702 m)   Wt 163 lb 7 oz (74.1 kg)   BMI 25.60 kg/m²         Assessment:      Problem List Items Addressed This Visit     None          The patients pain isPain Level: 0  . Wound(s) has slightly improved. Please refer to nursing measurements and assessment regarding wound pre and post debridement. Wound 10/12/17 Coccyx Wound 1, Pressure Stage 4, Coccyx (Active)   Wound Image   2019  1:29 PM   Wound Pressure Stage  4 2019  1:29 PM   Dressing Status Old drainage 2019  1:29 PM   Dressing Changed Changed/New 2019  3:15 PM   Dressing/Treatment Silver Dressing;ABD; [place] 2019  3:15 PM   Wound Cleansed Rinsed/Irrigated with saline 2019  1:29 PM   Wound Length (cm) 2 cm 2019  1:29 PM   Wound Width (cm) 1 cm 5/7/2019  1:29 PM   Wound Depth (cm) 2.9 cm 5/7/2019  1:29 PM   Wound Surface Area (cm^2) 2 cm^2 5/7/2019  1:29 PM   Change in Wound Size % (l*w) 64.91 5/7/2019  1:29 PM   Wound Volume (cm^3) 5.8 cm^3 5/7/2019  1:29 PM   Wound Healing % 61 5/7/2019  1:29 PM   Post-Procedure Length (cm) 2 cm 5/7/2019  1:50 PM   Post-Procedure Width (cm) 1 cm 5/7/2019  1:50 PM   Post-Procedure Depth (cm) 2.9 cm 5/7/2019  1:50 PM   Post-Procedure Surface Area (cm^2) 2 cm^2 5/7/2019  1:50 PM   Post-Procedure Volume (cm^3) 5.8 cm^3 5/7/2019  1:50 PM   Distance Tunneling (cm) 0 cm 5/7/2019  1:29 PM   Tunneling Position ___ O'Clock 0 5/7/2019  1:29 PM   Undermining Starts ___ O'Clock 0 5/7/2019  1:29 PM   Undermining Ends___ O'Clock 0 5/7/2019  1:29 PM   Undermining Maxium Distance (cm) 0 5/7/2019  1:29 PM   Wound Assessment Granulation tissue 5/7/2019  1:29 PM   Drainage Amount Moderate 5/7/2019  1:29 PM   Drainage Description Serosanguinous 5/7/2019  1:29 PM   Odor None 5/7/2019  1:29 PM   Margins Attached edges 5/7/2019  1:29 PM   Exposed structure Muscle 5/7/2019  1:29 PM   Sabine-wound Assessment Dry; Intact 5/7/2019  1:29 PM   Non-staged Wound Description Not applicable 7/1/0450  4:64 PM   [place]%Wound Bed 25 5/7/2019  1:29 PM   Red%Wound Bed 70 5/7/2019  1:29 PM   Yellow%Wound Bed 5 5/7/2019  1:29 PM   Black%Wound Bed 0 5/7/2019  1:29 PM   Purple%Wound Bed 0 5/7/2019  1:29 PM   Other%Wound Bed 0 5/7/2019  1:29 PM   Number of days: 572           Procedure Note  Indications:  Based on my examination of this patient's wound(s)/ulcer(s) today, debridement is required to promote healing and evaluate the wound base. Performed by: Armani Woodruff MD    Consent obtained:  Yes    Time out taken:  Yes    Pain Control: Anesthetic  Anesthetic: 2% Lidocaine Gel Topical       Debridement: Selective Debridement    Using curette the wound(s)/ulcer(s) was/were sharply debrided down through and including the removal of epidermis and dermis. Devitalized Tissue Debrided:  fibrin, biofilm and slough    Pre Debridement Measurements:  Are located in the Wound/Ulcer Documentation Flow Sheet    Wound/Ulcer #: 1    Post Debridement Measurements:  Wound/Ulcer Descriptions are Pre Debridement except measurements:          Percent of Wound(s)/Ulcer(s) Debrided: 100%    Total Surface Area Debrided:  2.0 sq cm     Diabetic/Pressure/Non Pressure Ulcers only:  Ulcer: Pressure ulcer, Stage 4     Estimated Blood Loss:  Minimal    Hemostasis Achieved:  by pressure    Response to treatment:  Well tolerated by patient. Plan:          Plan for wound - Dress per physician order  Treatment:     Compression : No   Offloading : Yes   Dressing : see AVS   Additional Therapy : none     1. Discussed appropriate home care of this wound. Wound redressed. 2. Written patient dismissal instructions given to patient and signed by patient or POA. 3. Follow up: 3 week(s). Ms. Ellen Bergman wound is essentially unchanged to minimally improved. Continue with current dressings, working on offloading, and protein supplementation. Follow up in three weeks.     Electronically signed by Tyra Del Cid MD on 5/7/2019 at 1:53 PM

## 2019-05-28 ENCOUNTER — HOSPITAL ENCOUNTER (OUTPATIENT)
Dept: WOUND CARE | Age: 79
Discharge: HOME | End: 2019-05-28
Payer: MEDICARE

## 2019-05-28 VITALS
WEIGHT: 163 LBS | TEMPERATURE: 98.2 F | DIASTOLIC BLOOD PRESSURE: 64 MMHG | HEART RATE: 75 BPM | RESPIRATION RATE: 18 BRPM | HEIGHT: 67 IN | SYSTOLIC BLOOD PRESSURE: 114 MMHG | BODY MASS INDEX: 25.58 KG/M2

## 2019-05-28 PROCEDURE — 11042 DBRDMT SUBQ TIS 1ST 20SQCM/<: CPT | Performed by: SURGERY

## 2019-05-28 PROCEDURE — 11042 DBRDMT SUBQ TIS 1ST 20SQCM/<: CPT

## 2019-05-28 ASSESSMENT — PAIN SCALES - GENERAL: PAINLEVEL_OUTOF10: 0

## 2019-05-28 NOTE — PROGRESS NOTES
including the removal of epidermis, dermis and subcutaneous tissue. Devitalized Tissue Debrided:  fibrin, biofilm and slough    Pre Debridement Measurements:  Are located in the Wound/Ulcer Documentation Flow Sheet    Wound/Ulcer #: 1    Post Debridement Measurements:  Wound/Ulcer Descriptions are Pre Debridement except measurements:          Percent of Wound(s)/Ulcer(s) Debrided: 100%    Total Surface Area Debrided:  2.64 sq cm     Diabetic/Pressure/Non Pressure Ulcers only:  Ulcer: Pressure ulcer, Stage 4     Estimated Blood Loss:  Minimal    Hemostasis Achieved:  not needed    Response to treatment:  Well tolerated by patient. Plan:          Plan for wound - Dress per physician order  Treatment:     Compression : No   Offloading : Yes   Dressing : see AVS   Additional Therapy : none     1. Discussed appropriate home care of this wound. Wound redressed. 2. Written patient dismissal instructions given to patient and signed by patient or POA. 3. Follow up: 2 week(s). Ms. Bonnee Severs wound is minimally improved. Will Continue with current dressing and offloading. She reports she has not been doing very good with protein, so she will work on that. Follow up in 2 weeks.     Electronically signed by Armani Woodruff MD on 5/28/2019 at 2:27 PM

## 2019-06-20 ENCOUNTER — TELEPHONE (OUTPATIENT)
Dept: CARDIOLOGY | Age: 79
End: 2019-06-20

## 2019-07-02 ENCOUNTER — HOSPITAL ENCOUNTER (OUTPATIENT)
Dept: WOUND CARE | Age: 79
Discharge: HOME | End: 2019-07-02
Payer: MEDICARE

## 2019-07-02 VITALS
SYSTOLIC BLOOD PRESSURE: 112 MMHG | BODY MASS INDEX: 26.99 KG/M2 | DIASTOLIC BLOOD PRESSURE: 68 MMHG | RESPIRATION RATE: 18 BRPM | HEART RATE: 74 BPM | WEIGHT: 171.96 LBS | HEIGHT: 67 IN | TEMPERATURE: 98.1 F

## 2019-07-02 PROCEDURE — 11042 DBRDMT SUBQ TIS 1ST 20SQCM/<: CPT

## 2019-07-02 PROCEDURE — 11042 DBRDMT SUBQ TIS 1ST 20SQCM/<: CPT | Performed by: SURGERY

## 2019-07-02 RX ORDER — TRAZODONE HYDROCHLORIDE 50 MG/1
50 TABLET ORAL DAILY
COMMUNITY

## 2019-07-02 RX ORDER — CHOLECALCIFEROL (VITAMIN D3) 125 MCG
5 CAPSULE ORAL NIGHTLY
COMMUNITY
End: 2019-10-29 | Stop reason: ALTCHOICE

## 2019-07-02 ASSESSMENT — PAIN SCALES - GENERAL: PAINLEVEL_OUTOF10: 0

## 2019-07-02 NOTE — PROGRESS NOTES
(cm) 0.9 cm 7/2/2019  2:49 PM   Wound Depth (cm) 1.8 cm 7/2/2019  2:49 PM   Wound Surface Area (cm^2) 1.98 cm^2 7/2/2019  2:49 PM   Change in Wound Size % (l*w) 65.26 7/2/2019  2:49 PM   Wound Volume (cm^3) 3.56 cm^3 7/2/2019  2:49 PM   Wound Healing % 76 7/2/2019  2:49 PM   Post-Procedure Length (cm) 2.2 cm 7/2/2019  3:09 PM   Post-Procedure Width (cm) 0.9 cm 7/2/2019  3:09 PM   Post-Procedure Depth (cm) 1.8 cm 7/2/2019  3:09 PM   Post-Procedure Surface Area (cm^2) 1.98 cm^2 7/2/2019  3:09 PM   Post-Procedure Volume (cm^3) 3.56 cm^3 7/2/2019  3:09 PM   Distance Tunneling (cm) 0 cm 7/2/2019  2:49 PM   Tunneling Position ___ O'Clock 0 7/2/2019  2:49 PM   Undermining Starts ___ O'Clock 0 7/2/2019  2:49 PM   Undermining Ends___ O'Clock 0 7/2/2019  2:49 PM   Undermining Maxium Distance (cm) 0 7/2/2019  2:49 PM   Wound Assessment Red;Pink;Drainage;Slough; Yellow 7/2/2019  2:49 PM   Drainage Amount Moderate 7/2/2019  2:49 PM   Drainage Description Serosanguinous 7/2/2019  2:49 PM   Odor None 7/2/2019  2:49 PM   Margins Attached edges 7/2/2019  2:49 PM   Exposed structure Muscle 7/2/2019  2:49 PM   Sabine-wound Assessment Pink;Dry; Intact 7/2/2019  2:49 PM   Non-staged Wound Description Not applicable 5/6/8744  9:91 PM   [place]%Wound Bed 30 7/2/2019  2:49 PM   Red%Wound Bed 60 7/2/2019  2:49 PM   Yellow%Wound Bed 10 7/2/2019  2:49 PM   Black%Wound Bed 0 7/2/2019  2:49 PM   Purple%Wound Bed 0 7/2/2019  2:49 PM   Other%Wound Bed 0 7/2/2019  2:49 PM   Number of days: 628           Procedure Note  Indications:  Based on my examination of this patient's wound(s)/ulcer(s) today, debridement is required to promote healing and evaluate the wound base.     Performed by: Denton Gautam MD    Consent obtained:  Yes    Time out taken:  Yes    Pain Control: Anesthetic  Anesthetic: 2% Lidocaine Gel Topical       Debridement: Excisional Debridement    Using curette the wound(s)/ulcer(s) was/were sharply debrided down through and including the

## 2019-07-23 ENCOUNTER — HOSPITAL ENCOUNTER (OUTPATIENT)
Dept: WOUND CARE | Age: 79
Discharge: HOME | End: 2019-07-23
Payer: MEDICARE

## 2019-07-23 VITALS
SYSTOLIC BLOOD PRESSURE: 111 MMHG | HEART RATE: 69 BPM | TEMPERATURE: 97.8 F | RESPIRATION RATE: 16 BRPM | DIASTOLIC BLOOD PRESSURE: 57 MMHG | BODY MASS INDEX: 26.84 KG/M2 | WEIGHT: 171 LBS | HEIGHT: 67 IN

## 2019-07-23 PROCEDURE — 11042 DBRDMT SUBQ TIS 1ST 20SQCM/<: CPT

## 2019-07-23 PROCEDURE — 11042 DBRDMT SUBQ TIS 1ST 20SQCM/<: CPT | Performed by: SURGERY

## 2019-07-23 ASSESSMENT — PAIN SCALES - GENERAL: PAINLEVEL_OUTOF10: 0

## 2019-08-02 ENCOUNTER — TELEPHONE (OUTPATIENT)
Dept: CARDIOLOGY | Age: 79
End: 2019-08-02

## 2019-08-13 ENCOUNTER — HOSPITAL ENCOUNTER (OUTPATIENT)
Dept: WOUND CARE | Age: 79
Discharge: HOME | End: 2019-08-13
Payer: MEDICARE

## 2019-08-13 VITALS
SYSTOLIC BLOOD PRESSURE: 111 MMHG | HEIGHT: 67 IN | BODY MASS INDEX: 26.84 KG/M2 | TEMPERATURE: 97.4 F | WEIGHT: 171 LBS | HEART RATE: 65 BPM | DIASTOLIC BLOOD PRESSURE: 58 MMHG | RESPIRATION RATE: 16 BRPM

## 2019-08-13 PROCEDURE — 11042 DBRDMT SUBQ TIS 1ST 20SQCM/<: CPT

## 2019-08-13 PROCEDURE — 11042 DBRDMT SUBQ TIS 1ST 20SQCM/<: CPT | Performed by: SURGERY

## 2019-08-13 ASSESSMENT — PAIN DESCRIPTION - FREQUENCY: FREQUENCY: CONTINUOUS

## 2019-08-13 ASSESSMENT — PAIN SCALES - GENERAL: PAINLEVEL_OUTOF10: 7

## 2019-08-13 ASSESSMENT — PAIN DESCRIPTION - LOCATION: LOCATION: BACK

## 2019-08-13 ASSESSMENT — PAIN DESCRIPTION - ONSET: ONSET: ON-GOING

## 2019-08-13 ASSESSMENT — PAIN DESCRIPTION - ORIENTATION: ORIENTATION: LOWER

## 2019-08-13 ASSESSMENT — PAIN DESCRIPTION - PAIN TYPE: TYPE: ACUTE PAIN

## 2019-08-13 ASSESSMENT — PAIN DESCRIPTION - DESCRIPTORS: DESCRIPTORS: ACHING

## 2019-08-13 NOTE — PROGRESS NOTES
Wound Care Center   Progress Note and Procedure Note      Ward Miranda  AGE: [de-identified] y.o. GENDER: female  : 1940  TODAY'S DATE:  2019    Subjective:        HISTORY of PRESENT ILLNESS HPI   Ward Miranda is a [de-identified] y.o. female who presents today for wound/ulcer evaluation. Ulcer Type:pressure  Ulcer/Wound Location:sacral  Contributing factors:chronic pressure, decreased mobility, shear force and malnutrition    Patient Active Problem List   Diagnosis Code    SA node dysfunction ([facility]) I49.5    Pacemaker L82.0    Diastolic dysfunction Z92.77    Decubitus ulcer of sacral region, stage 4 ([facility]) L89.154    Debilitated R53.81    Malnutrition ([address] E46    H/O back injury Z87.828    Incontinence of feces R15.9    Severe protein-calorie malnutrition ([facility]) E43    Other osteomyelitis, other site ([facility]) M86.8X8    Scalp cyst L72.9       ALLERGIES    Allergies   Allergen Reactions    Morphine Sulfate [Morphine] Anaphylaxis     Pt states she quit breathing      Ciprocinonide [Fluocinolone]     Codeine Phosphate [Codeine] Nausea Only    Quinolones            Objective:      BP (!) 111/58   Pulse 65   Temp 97.4 °F (36.3 °C) (Temporal)   Resp 16   Ht 5' 7\" (1.702 m)   Wt 171 lb (77.6 kg)   BMI 26.78 kg/m²         Assessment:      Problem List Items Addressed This Visit     None          The patients pain isPain Level: 7 Pain Type: Acute pain. Wound(s) is unchanged. Please refer to nursing measurements and assessment regarding wound pre and post debridement. Wound 10/12/17 Coccyx Wound 1, Pressure Stage 4, Coccyx (Active)   Wound Image   2019  1:47 PM   Wound Pressure Stage  4 2019  1:47 PM   Dressing Status Old drainage 2019  1:47 PM   Dressing Changed Changed/New 2019  2:35 PM   Dressing/Treatment Silver Dressing;ABD; [place] 2019  3:15 PM   Wound Cleansed Rinsed/Irrigated with saline 2019  1:30 PM   Wound Length (cm) 1.8 cm 2019  1:47 PM   Wound Width (cm) 1.5 cm including the removal of epidermis, dermis and subcutaneous tissue. Devitalized Tissue Debrided:  fibrin, biofilm and slough    Pre Debridement Measurements:  Are located in the Wound/Ulcer Documentation Flow Sheet    Wound/Ulcer #: 1    Post Debridement Measurements:  Wound/Ulcer Descriptions are Pre Debridement except measurements:          Percent of Wound(s)/Ulcer(s) Debrided: 100%    Total Surface Area Debrided:  2.7 sq cm     Diabetic/Pressure/Non Pressure Ulcers only:  Ulcer: Pressure ulcer, Stage 4     Estimated Blood Loss:  Minimal    Hemostasis Achieved:  by pressure    Response to treatment:  Well tolerated by patient. Plan:          Plan for wound - Dress per physician order  Treatment:     Compression : No   Offloading : Yes   Dressing : see AVS   Additional Therapy : none     1. Discussed appropriate home care of this wound. Wound redressed. 2. Written patient dismissal instructions given to patient and signed by patient or POA. 3. Follow up: 2-3 week(s). Ms. Brcie Gamboa wound appears stable. She reports that she has had issues with her air mattress, and they are getting her a new one today. The new area of irritation and developing wound that had started at her last appointment is quite a bit better today. Continue with packing and follow up in 2-3 weeks.        Electronically signed by Kita Muniz MD on 8/13/2019 at 2:19 PM

## 2019-08-15 DIAGNOSIS — Z95.0 PACEMAKER: Primary | ICD-10-CM

## 2019-08-15 DIAGNOSIS — I49.5 SA NODE DYSFUNCTION (HCC): ICD-10-CM

## 2019-08-15 PROCEDURE — 93294 REM INTERROG EVL PM/LDLS PM: CPT | Performed by: CLINICAL NURSE SPECIALIST

## 2019-08-15 PROCEDURE — 93296 REM INTERROG EVL PM/IDS: CPT | Performed by: CLINICAL NURSE SPECIALIST

## 2019-08-27 ENCOUNTER — HOSPITAL ENCOUNTER (OUTPATIENT)
Dept: WOUND CARE | Age: 79
Discharge: HOME | End: 2019-08-27
Payer: MEDICARE

## 2019-08-27 VITALS
DIASTOLIC BLOOD PRESSURE: 65 MMHG | WEIGHT: 177.91 LBS | RESPIRATION RATE: 16 BRPM | TEMPERATURE: 99.1 F | HEIGHT: 67 IN | SYSTOLIC BLOOD PRESSURE: 124 MMHG | BODY MASS INDEX: 27.92 KG/M2 | HEART RATE: 67 BPM

## 2019-08-27 PROCEDURE — 11042 DBRDMT SUBQ TIS 1ST 20SQCM/<: CPT | Performed by: SURGERY

## 2019-08-27 PROCEDURE — 11042 DBRDMT SUBQ TIS 1ST 20SQCM/<: CPT

## 2019-08-27 RX ORDER — GUAIFENESIN 600 MG/1
1200 TABLET, EXTENDED RELEASE ORAL 2 TIMES DAILY
COMMUNITY

## 2019-08-27 ASSESSMENT — PAIN SCALES - GENERAL: PAINLEVEL_OUTOF10: 4

## 2019-08-27 ASSESSMENT — PAIN DESCRIPTION - PAIN TYPE: TYPE: CHRONIC PAIN

## 2019-08-27 ASSESSMENT — PAIN DESCRIPTION - FREQUENCY: FREQUENCY: CONTINUOUS

## 2019-08-27 ASSESSMENT — PAIN DESCRIPTION - PROGRESSION: CLINICAL_PROGRESSION: NOT CHANGED

## 2019-08-27 ASSESSMENT — PAIN DESCRIPTION - ONSET: ONSET: ON-GOING

## 2019-08-27 ASSESSMENT — PAIN DESCRIPTION - DESCRIPTORS: DESCRIPTORS: ACHING

## 2019-08-27 ASSESSMENT — PAIN DESCRIPTION - LOCATION: LOCATION: BACK

## 2019-09-17 ENCOUNTER — HOSPITAL ENCOUNTER (OUTPATIENT)
Dept: WOUND CARE | Age: 79
Discharge: HOME | End: 2019-09-17

## 2019-10-01 ENCOUNTER — HOSPITAL ENCOUNTER (OUTPATIENT)
Dept: WOUND CARE | Age: 79
Discharge: HOME | End: 2019-10-01
Payer: MEDICARE

## 2019-10-01 VITALS
HEIGHT: 67 IN | WEIGHT: 180.56 LBS | RESPIRATION RATE: 18 BRPM | TEMPERATURE: 97.3 F | DIASTOLIC BLOOD PRESSURE: 62 MMHG | BODY MASS INDEX: 28.34 KG/M2 | SYSTOLIC BLOOD PRESSURE: 110 MMHG | HEART RATE: 57 BPM

## 2019-10-01 PROCEDURE — 11042 DBRDMT SUBQ TIS 1ST 20SQCM/<: CPT | Performed by: SURGERY

## 2019-10-01 PROCEDURE — 11042 DBRDMT SUBQ TIS 1ST 20SQCM/<: CPT

## 2019-10-01 ASSESSMENT — PAIN DESCRIPTION - ORIENTATION: ORIENTATION: LEFT

## 2019-10-01 ASSESSMENT — PAIN SCALES - GENERAL: PAINLEVEL_OUTOF10: 2

## 2019-10-01 ASSESSMENT — PAIN DESCRIPTION - PAIN TYPE: TYPE: CHRONIC PAIN

## 2019-10-01 ASSESSMENT — PAIN DESCRIPTION - LOCATION: LOCATION: SHOULDER

## 2019-10-15 ENCOUNTER — OFFICE VISIT (OUTPATIENT)
Dept: CARDIOLOGY | Age: 79
End: 2019-10-15
Payer: MEDICARE

## 2019-10-15 VITALS
DIASTOLIC BLOOD PRESSURE: 60 MMHG | WEIGHT: 177 LBS | SYSTOLIC BLOOD PRESSURE: 100 MMHG | BODY MASS INDEX: 26.83 KG/M2 | HEART RATE: 74 BPM | HEIGHT: 68 IN

## 2019-10-15 DIAGNOSIS — I49.5 SA NODE DYSFUNCTION (HCC): Primary | ICD-10-CM

## 2019-10-15 PROCEDURE — 1036F TOBACCO NON-USER: CPT | Performed by: NURSE PRACTITIONER

## 2019-10-15 PROCEDURE — 1090F PRES/ABSN URINE INCON ASSESS: CPT | Performed by: NURSE PRACTITIONER

## 2019-10-15 PROCEDURE — 1123F ACP DISCUSS/DSCN MKR DOCD: CPT | Performed by: NURSE PRACTITIONER

## 2019-10-15 PROCEDURE — G8400 PT W/DXA NO RESULTS DOC: HCPCS | Performed by: NURSE PRACTITIONER

## 2019-10-15 PROCEDURE — G8484 FLU IMMUNIZE NO ADMIN: HCPCS | Performed by: NURSE PRACTITIONER

## 2019-10-15 PROCEDURE — 99213 OFFICE O/P EST LOW 20 MIN: CPT | Performed by: NURSE PRACTITIONER

## 2019-10-15 PROCEDURE — G8427 DOCREV CUR MEDS BY ELIG CLIN: HCPCS | Performed by: NURSE PRACTITIONER

## 2019-10-15 PROCEDURE — 4040F PNEUMOC VAC/ADMIN/RCVD: CPT | Performed by: NURSE PRACTITIONER

## 2019-10-15 PROCEDURE — 93288 INTERROG EVL PM/LDLS PM IP: CPT | Performed by: NURSE PRACTITIONER

## 2019-10-15 PROCEDURE — G8419 CALC BMI OUT NRM PARAM NOF/U: HCPCS | Performed by: NURSE PRACTITIONER

## 2019-10-29 ENCOUNTER — HOSPITAL ENCOUNTER (OUTPATIENT)
Dept: WOUND CARE | Age: 79
Discharge: HOME | End: 2019-10-29
Payer: MEDICARE

## 2019-10-29 VITALS
WEIGHT: 180.12 LBS | RESPIRATION RATE: 20 BRPM | BODY MASS INDEX: 27.3 KG/M2 | DIASTOLIC BLOOD PRESSURE: 69 MMHG | HEART RATE: 92 BPM | TEMPERATURE: 98.1 F | SYSTOLIC BLOOD PRESSURE: 122 MMHG | HEIGHT: 68 IN

## 2019-10-29 PROCEDURE — 11042 DBRDMT SUBQ TIS 1ST 20SQCM/<: CPT | Performed by: SURGERY

## 2019-10-29 PROCEDURE — 11042 DBRDMT SUBQ TIS 1ST 20SQCM/<: CPT

## 2019-10-29 ASSESSMENT — PAIN SCALES - GENERAL: PAINLEVEL_OUTOF10: 4

## 2019-10-29 ASSESSMENT — PAIN DESCRIPTION - ONSET: ONSET: ON-GOING

## 2019-10-29 ASSESSMENT — PAIN DESCRIPTION - LOCATION: LOCATION: KNEE

## 2019-10-29 ASSESSMENT — PAIN DESCRIPTION - FREQUENCY: FREQUENCY: INTERMITTENT

## 2019-10-29 ASSESSMENT — PAIN DESCRIPTION - ORIENTATION: ORIENTATION: LEFT

## 2019-10-29 ASSESSMENT — PAIN DESCRIPTION - DESCRIPTORS: DESCRIPTORS: ACHING

## 2019-10-29 ASSESSMENT — PAIN DESCRIPTION - PROGRESSION: CLINICAL_PROGRESSION: NOT CHANGED

## 2019-10-29 ASSESSMENT — PAIN DESCRIPTION - PAIN TYPE: TYPE: CHRONIC PAIN

## 2019-12-12 ENCOUNTER — HOSPITAL ENCOUNTER (OUTPATIENT)
Dept: WOUND CARE | Age: 79
Discharge: HOME | End: 2019-12-12
Payer: MEDICARE

## 2019-12-12 VITALS
HEART RATE: 75 BPM | RESPIRATION RATE: 20 BRPM | BODY MASS INDEX: 28.64 KG/M2 | WEIGHT: 189 LBS | HEIGHT: 68 IN | DIASTOLIC BLOOD PRESSURE: 69 MMHG | SYSTOLIC BLOOD PRESSURE: 135 MMHG | TEMPERATURE: 97.2 F

## 2019-12-12 PROCEDURE — 11042 DBRDMT SUBQ TIS 1ST 20SQCM/<: CPT | Performed by: SURGERY

## 2019-12-12 PROCEDURE — 11042 DBRDMT SUBQ TIS 1ST 20SQCM/<: CPT

## 2019-12-12 ASSESSMENT — PAIN SCALES - GENERAL: PAINLEVEL_OUTOF10: 0

## 2020-01-16 ENCOUNTER — TELEPHONE (OUTPATIENT)
Dept: CARDIOLOGY | Age: 80
End: 2020-01-16

## 2020-01-16 NOTE — TELEPHONE ENCOUNTER
Spoke with nurse:  Lizabeth Skinner @ [address] [place] [address] in [place]; She stated the pt does not get up until 4 or 5 in the afternoon and that she would try to get the pacemaker reading sent in at that time.

## 2020-01-21 ENCOUNTER — HOSPITAL ENCOUNTER (OUTPATIENT)
Dept: WOUND CARE | Age: 80
Discharge: HOME | End: 2020-01-21
Payer: MEDICARE

## 2020-01-21 VITALS
SYSTOLIC BLOOD PRESSURE: 110 MMHG | BODY MASS INDEX: 28.64 KG/M2 | DIASTOLIC BLOOD PRESSURE: 58 MMHG | RESPIRATION RATE: 20 BRPM | WEIGHT: 189 LBS | HEART RATE: 73 BPM | TEMPERATURE: 97 F | HEIGHT: 68 IN

## 2020-01-21 PROCEDURE — 11042 DBRDMT SUBQ TIS 1ST 20SQCM/<: CPT

## 2020-01-21 PROCEDURE — 11042 DBRDMT SUBQ TIS 1ST 20SQCM/<: CPT | Performed by: SURGERY

## 2020-01-21 ASSESSMENT — PAIN DESCRIPTION - FREQUENCY: FREQUENCY: INTERMITTENT

## 2020-01-21 ASSESSMENT — PAIN DESCRIPTION - ONSET: ONSET: ON-GOING

## 2020-01-21 ASSESSMENT — PAIN DESCRIPTION - PROGRESSION: CLINICAL_PROGRESSION: NOT CHANGED

## 2020-01-21 ASSESSMENT — PAIN SCALES - GENERAL: PAINLEVEL_OUTOF10: 0

## 2020-01-21 NOTE — PROGRESS NOTES
Only    Quinolones        MEDICATIONS    Current Outpatient Medications on File Prior to Encounter   Medication Sig Dispense Refill    bisacodyl (DULCOLAX) 5 MG EC tablet Take 5 mg by mouth daily as needed for Constipation      NONFORMULARY 30 mLs daily Prostat      guaiFENesin (MUCINEX) 600 MG extended release tablet Take 1,200 mg by mouth 2 times daily      vitamin D 1000 units CAPS Take 1 capsule by mouth daily      traZODone (DESYREL) 50 MG tablet Take 50 mg by mouth daily      menthol-zinc oxide (CALMOSEPTINE) 0.44-20.625 % OINT ointment Apply topically daily Max 30 ml per day.  omeprazole (PRILOSEC) 20 MG delayed release capsule Take 20 mg by mouth daily      magnesium citrate solution Take 296 mLs by mouth once as needed for Constipation      magnesium hydroxide (MILK OF MAGNESIA) 400 MG/5ML suspension Take by mouth daily as needed for Constipation      tolnaftate (TINACTIN) 1 % cream Apply topically 2 times daily       traMADol (ULTRAM ER) 300 MG extended releae tablet Take 300 mg by mouth daily.       rOPINIRole (REQUIP) 0.5 MG tablet Take 0.5 mg by mouth nightly       Multiple Vitamins-Minerals (THERAPEUTIC MULTIVITAMIN-MINERALS) tablet Take 1 tablet by mouth daily      furosemide (LASIX) 40 MG tablet Take 40 mg by mouth 2 times daily       citalopram (CELEXA) 40 MG tablet Take 40 mg by mouth daily       traMADol (ULTRAM) 50 MG tablet Take 50 mg by mouth nightly       ipratropium (ATROVENT) 0.03 % nasal spray 2 sprays by Nasal route 3 times daily as needed       bacitracin 500 UNIT/GM ophthalmic ointment Place into the left eye nightly       atorvastatin (LIPITOR) 10 MG tablet Take 10 mg by mouth nightly       acetaminophen (TYLENOL) 325 MG tablet Take 325 mg by mouth every 12 hours as needed for Pain       loratadine (CLARITIN) 10 MG tablet Take 10 mg by mouth nightly       baclofen (LIORESAL) 10 MG tablet Take 10 mg by mouth 3 times daily as needed       valACYclovir (VALTREX) 500 MG tablet Take 500 mg by mouth 2 times daily      docusate sodium (COLACE) 100 MG capsule Take 200 mg by mouth 2 times daily        No current facility-administered medications on file prior to encounter. REVIEW OF SYSTEMS    A comprehensive review of systems was negative.     Objective:      BP (!) 110/58   Pulse 73   Temp 97 °F (36.1 °C) (Temporal)   Resp 20   Ht 5' 8\" (1.727 m)   Wt 189 lb (85.7 kg)   BMI 28.74 kg/m²     Wt Readings from Last 3 Encounters:   01/21/20 189 lb (85.7 kg)   12/12/19 189 lb (85.7 kg)   10/29/19 180 lb 1.9 oz (81.7 kg)       PHYSICAL EXAM    General Appearance: alert and oriented to person, place and time, well developed and well- nourished, in no acute distress  Skin: warm and dry, no rash or erythema  Head: normocephalic and atraumatic  Eyes: pupils equal, round, and reactive to light, extraocular eye movements intact, conjunctivae normal  ENT: tympanic membrane, external ear and ear canal normal bilaterally, nose without deformity, nasal mucosa and turbinates normal without polyps, lips teeth and gums normal  Neck: supple and non-tender without mass, no thyromegaly or thyroid nodules, no cervical lymphadenopathy  Pulmonary/Chest: clear to auscultation bilaterally- no wheezes, rales or rhonchi, normal air movement, no respiratory distress  Cardiovascular: normal rate, regular rhythm, normal S1 and S2, no murmurs, rubs, clicks, or gallops, distal pulses intact, no carotid bruits  Abdomen: soft, non-tender, non-distended, normal bowel sounds, no masses or organomegaly  Extremities: no cyanosis, clubbing or edema  Musculoskeletal: normal range of motion, no joint swelling, deformity or tenderness  Neurologic: reflexes normal and symmetric, no cranial nerve deficit, gait, coordination and speech normal, skin sensation normal      Assessment:      Problem List Items Addressed This Visit     None           Procedure Note  Indications:  Based on my examination of this 1/21/2020  1:31 PM   Undermining Starts ___ O'Clock 0 1/21/2020  1:31 PM   Undermining Ends___ O'Clock 0 1/21/2020  1:31 PM   Undermining Maxium Distance (cm) 0 1/21/2020  1:31 PM   Wound Assessment Drainage;Red;Slough; Yellow;Granulation tissue 1/21/2020  1:31 PM   Drainage Amount Moderate 1/21/2020  1:31 PM   Drainage Description Serosanguinous 1/21/2020  1:31 PM   Odor None 1/21/2020  1:31 PM   Margins Attached edges 1/21/2020  1:31 PM   Sabine-wound Assessment Dry;Pink 1/21/2020  1:31 PM   Non-staged Wound Description Not applicable 8/96/8489  7:85 PM   [place]%Wound Bed 0 1/21/2020  1:31 PM   Red%Wound Bed 95 1/21/2020  1:31 PM   Yellow%Wound Bed 5 1/21/2020  1:31 PM   Black%Wound Bed 0 1/21/2020  1:31 PM   Purple%Wound Bed 0 1/21/2020  1:31 PM   Other%Wound Bed 0 1/21/2020  1:31 PM   Number of days: 831             Estimated Blood Loss:  Minimal    Hemostasis Achieved:  by pressure    Procedural Pain:  0  / 10     Post Procedural Pain:  0 / 10     Response to treatment:  Well tolerated by patient. Plan:     Problem List Items Addressed This Visit     None          Pt denies having a ROHO and is in a wheelchair 5-6 hrs a day. We will get ROHO and cont pushing nutrition. Pt reportedly has chronic osteomyelitis. Planning pending    Treatment Note please see attached Discharge Instructions    In my professional opinion this patient would benefit from HBO Therapy: No    Written patient dismissal instructions given to patient and signed by patient or POA. Discharge Instructions       Visit Discharge/Physician Orders    Discharge condition: Stable    Discharge to: Home    Left via:Private automobile    Accompanied by:  self    ECF/HHA: [facility]-       Dressing Orders: Coccyx: Apply crusting to periwound or REFUGIO cream of barrier cream to periwound -irritated area. Wash with soap and water. Cover with Silver Alginate or Aquacell AG to wound bed. Cover with ABD. Secure with hytape.  Change twice daily .     Apply Tinactin to both feet twice daily. Treatment Orders:   Avoid Pressure to wound site. Turn frequently (turn at least every two hours when in bed)     Off-load heels by applying heel-lift boots or \"float\" heels by placing pillows under calves so that heels do not touch mattress.     Continue Protein rich diet (unless restricted by your physician). Please continue Protein supplements.     Take Multivitamin daily     Please use low air loss bed  Please use ROHO Cushion in wheelchair    Coccyx wound measurements: 1.1 x 0.9 x 0.5    WCC followup visit _______2 weeks______________________  (Please note your next appointment above and if you are unable to keep, kindly give a 24 hour notice. Thank you.)          If you experience any of the following, please call the [facility] during business hours:    * Increase in Pain  * Temperature over 101  * Increase in drainage from your wound  * Drainage with a foul odor  * Bleeding  * Increase in swelling  * Need for compression bandage changes due to slippage, breakthrough drainage. If you need medical attention outside of the business hours of the [facility] please contact your PCP or go to the nearest emergency room.           Electronically signed by Kirill Aaron MD on 1/21/2020 at 1:49 PM

## 2020-02-06 PROCEDURE — 93294 REM INTERROG EVL PM/LDLS PM: CPT | Performed by: NURSE PRACTITIONER

## 2020-02-06 PROCEDURE — 93296 REM INTERROG EVL PM/IDS: CPT | Performed by: NURSE PRACTITIONER

## 2020-02-11 ENCOUNTER — HOSPITAL ENCOUNTER (OUTPATIENT)
Dept: WOUND CARE | Age: 80
Discharge: HOME | End: 2020-02-11
Payer: MEDICARE

## 2020-02-11 VITALS
WEIGHT: 189 LBS | RESPIRATION RATE: 18 BRPM | HEIGHT: 68 IN | SYSTOLIC BLOOD PRESSURE: 115 MMHG | HEART RATE: 70 BPM | DIASTOLIC BLOOD PRESSURE: 60 MMHG | BODY MASS INDEX: 28.64 KG/M2 | TEMPERATURE: 97.8 F

## 2020-02-11 PROBLEM — L72.9 SCALP CYST: Status: ACTIVE | Noted: 2019-04-02

## 2020-02-11 PROCEDURE — 97597 DBRDMT OPN WND 1ST 20 CM/<: CPT

## 2020-02-11 PROCEDURE — 97597 DBRDMT OPN WND 1ST 20 CM/<: CPT | Performed by: SURGERY

## 2020-02-11 ASSESSMENT — PAIN DESCRIPTION - ONSET: ONSET: ON-GOING

## 2020-02-11 ASSESSMENT — PAIN DESCRIPTION - FREQUENCY: FREQUENCY: INTERMITTENT

## 2020-02-11 ASSESSMENT — PAIN SCALES - GENERAL: PAINLEVEL_OUTOF10: 0

## 2020-02-11 ASSESSMENT — PAIN DESCRIPTION - PAIN TYPE: TYPE: CHRONIC PAIN

## 2020-02-11 ASSESSMENT — PAIN DESCRIPTION - PROGRESSION: CLINICAL_PROGRESSION: GRADUALLY IMPROVING

## 2020-02-11 NOTE — PROGRESS NOTES
[address]   Progress Note and Procedure Note      Merlinda Holts  MEDICAL RECORD NUMBER:  [id number]  AGE: [de-identified] y.o. GENDER: female  : 1940  EPISODE DATE:  2020    Subjective:     Chief Complaint   Patient presents with    Wound Check     Follow up         HISTORY of PRESENT ILLNESS HPI     Merlinda Holts is a [de-identified] y.o. female who presents today for wound/ulcer evaluation.    Wound Context: Pt with coccyx wound here for eval/treat  Wound/Ulcer Pain Timing/Severity: none  Quality of pain: N/A  Severity:  0 / 10   Modifying Factors: None  Associated Signs/Symptoms: none    Ulcer Identification:  Ulcer Type: pressure  Contributing Factors: chronic pressure and shear force    Wound: pressure        PAST MEDICAL HISTORY        Diagnosis Date    CAD (coronary artery disease)     Depressive disorder     GERD (gastroesophageal reflux disease)     bleeding ulcer    Hemorrhage of gastrointestinal tract     Hyperlipidemia     Hypertension     Osteoarthrosis     Pacemaker     Retention of fluid     Sick sinus syndrome (HCC)     Sinus node dysfunction (HCC)        PAST SURGICAL HISTORY    Past Surgical History:   Procedure Laterality Date    APPENDECTOMY      BACK SURGERY      CHOLECYSTECTOMY      COLONOSCOPY      DILATATION, ESOPHAGUS      EYE SURGERY      HYSTERECTOMY      PACEMAKER PLACEMENT  2010    [employer]       FAMILY HISTORY    Family History   Problem Relation Age of Onset    Heart Disease Mother     Heart Disease Father        SOCIAL HISTORY    Social History     Tobacco Use    Smoking status: Former Smoker     Types: Cigarettes    Smokeless tobacco: Never Used   Substance Use Topics    Alcohol use: No    Drug use: No       ALLERGIES    Allergies   Allergen Reactions    Morphine Sulfate [Morphine] Anaphylaxis     Pt states she quit breathing      Ciprocinonide [Fluocinolone]     Codeine Phosphate [Codeine] Nausea Only    Quinolones 500 mg by mouth 2 times daily      docusate sodium (COLACE) 100 MG capsule Take 200 mg by mouth 2 times daily        No current facility-administered medications on file prior to encounter. REVIEW OF SYSTEMS    A comprehensive review of systems was negative.     Objective:      /60   Pulse 70   Temp 97.8 °F (36.6 °C) (Temporal)   Resp 18   Ht 5' 8\" (1.727 m)   Wt 189 lb (85.7 kg)   BMI 28.74 kg/m²     Wt Readings from Last 3 Encounters:   02/11/20 189 lb (85.7 kg)   01/21/20 189 lb (85.7 kg)   12/12/19 189 lb (85.7 kg)       PHYSICAL EXAM    General Appearance: alert and oriented to person, place and time, well developed and well- nourished, in no acute distress  Skin: warm and dry, no rash or erythema  Head: normocephalic and atraumatic  Eyes: pupils equal, round, and reactive to light, extraocular eye movements intact, conjunctivae normal  ENT: tympanic membrane, external ear and ear canal normal bilaterally, nose without deformity, nasal mucosa and turbinates normal without polyps, lips teeth and gums normal  Neck: supple and non-tender without mass, no thyromegaly or thyroid nodules, no cervical lymphadenopathy  Pulmonary/Chest: clear to auscultation bilaterally- no wheezes, rales or rhonchi, normal air movement, no respiratory distress  Cardiovascular: normal rate, regular rhythm, normal S1 and S2, no murmurs, rubs, clicks, or gallops, distal pulses intact, no carotid bruits  Abdomen: soft, non-tender, non-distended, normal bowel sounds, no masses or organomegaly  Extremities: no cyanosis, clubbing or edema  Musculoskeletal: normal range of motion, no joint swelling, deformity or tenderness  Neurologic: reflexes normal and symmetric, no cranial nerve deficit, gait, coordination and speech normal, skin sensation normal      Assessment:      Problem List Items Addressed This Visit     * (Principal) Decubitus ulcer of sacral region, stage 4 (HCC) (Chronic)    Malnutrition ([facility] [address] - Primary with hytape. Change twice daily .     Apply Tinactin to both feet twice daily. Treatment Orders:   Avoid Pressure to wound site. Turn frequently (turn at least every two hours when in bed)     Off-load heels by applying heel-lift boots or \"float\" heels by placing pillows under calves so that heels do not touch mattress.     Continue Protein rich diet (unless restricted by your physician). Please continue Protein supplements.     Take Multivitamin daily     Please use low air loss bed  Please use ROHO Cushion in wheelchair    Coccyx wound measurements: 1 x 0.5 x 0.6    WCC followup visit ________2 weeks_____________________  (Please note your next appointment above and if you are unable to keep, kindly give a 24 hour notice. Thank you.)          If you experience any of the following, please call the [facility] during business hours:    * Increase in Pain  * Temperature over 101  * Increase in drainage from your wound  * Drainage with a foul odor  * Bleeding  * Increase in swelling  * Need for compression bandage changes due to slippage, breakthrough drainage. If you need medical attention outside of the business hours of the [facility] please contact your PCP or go to the nearest emergency room.           Electronically signed by Janneth Goldman MD on 2/11/2020 at 2:28 PM

## 2020-02-25 ENCOUNTER — HOSPITAL ENCOUNTER (OUTPATIENT)
Dept: WOUND CARE | Age: 80
Discharge: HOME | End: 2020-02-25
Payer: MEDICARE

## 2020-02-25 VITALS — BODY MASS INDEX: 28.64 KG/M2 | HEIGHT: 68 IN | WEIGHT: 189 LBS

## 2020-02-25 PROCEDURE — 11042 DBRDMT SUBQ TIS 1ST 20SQCM/<: CPT

## 2020-02-25 PROCEDURE — 11042 DBRDMT SUBQ TIS 1ST 20SQCM/<: CPT | Performed by: SURGERY

## 2020-02-25 ASSESSMENT — PAIN DESCRIPTION - ORIENTATION: ORIENTATION: LEFT

## 2020-02-25 ASSESSMENT — PAIN DESCRIPTION - PAIN TYPE: TYPE: CHRONIC PAIN

## 2020-02-25 ASSESSMENT — PAIN DESCRIPTION - DESCRIPTORS: DESCRIPTORS: ACHING

## 2020-02-25 ASSESSMENT — PAIN DESCRIPTION - FREQUENCY: FREQUENCY: INTERMITTENT

## 2020-02-25 ASSESSMENT — PAIN DESCRIPTION - ONSET: ONSET: ON-GOING

## 2020-02-25 ASSESSMENT — PAIN DESCRIPTION - LOCATION: LOCATION: KNEE

## 2020-02-25 ASSESSMENT — PAIN SCALES - GENERAL: PAINLEVEL_OUTOF10: 0

## 2020-02-25 ASSESSMENT — PAIN DESCRIPTION - PROGRESSION: CLINICAL_PROGRESSION: GRADUALLY IMPROVING

## 2020-02-26 NOTE — PROGRESS NOTES
[address]   Progress Note and Procedure Note      Larisa Mckinney  MEDICAL RECORD NUMBER:  [id number]  AGE: [de-identified] y.o. GENDER: female  : 1940  EPISODE DATE:  2020    Subjective:     Chief Complaint   Patient presents with    Wound Check     follow up         HISTORY of PRESENT ILLNESS HPI     Larisa Mckinney is a [de-identified] y.o. female who presents today for wound/ulcer evaluation.    Wound Context: Pt with stage 4 sacral decub here for eval/treat  Wound/Ulcer Pain Timing/Severity: none  Quality of pain: N/A  Severity:  0 / 10   Modifying Factors: None  Associated Signs/Symptoms: none    Ulcer Identification:  Ulcer Type: pressure  Contributing Factors: chronic pressure, decreased mobility, shear force and obesity    Wound: pressure        PAST MEDICAL HISTORY        Diagnosis Date    CAD (coronary artery disease)     Depressive disorder     GERD (gastroesophageal reflux disease)     bleeding ulcer    Hemorrhage of gastrointestinal tract     Hyperlipidemia     Hypertension     Osteoarthrosis     Pacemaker     Retention of fluid     Sick sinus syndrome (HCC)     Sinus node dysfunction (HCC)        PAST SURGICAL HISTORY    Past Surgical History:   Procedure Laterality Date    APPENDECTOMY      BACK SURGERY      CHOLECYSTECTOMY      COLONOSCOPY      DILATATION, ESOPHAGUS      EYE SURGERY      HYSTERECTOMY      PACEMAKER PLACEMENT  2010    [employer]       FAMILY HISTORY    Family History   Problem Relation Age of Onset    Heart Disease Mother     Heart Disease Father        SOCIAL HISTORY    Social History     Tobacco Use    Smoking status: Former Smoker     Types: Cigarettes    Smokeless tobacco: Never Used   Substance Use Topics    Alcohol use: No    Drug use: No       ALLERGIES    Allergies   Allergen Reactions    Morphine Sulfate [Morphine] Anaphylaxis     Pt states she quit breathing      Ciprocinonide [Fluocinolone]     Codeine Phosphate [Codeine] Nausea Only    Quinolones        MEDICATIONS    Current Outpatient Medications on File Prior to Encounter   Medication Sig Dispense Refill    bisacodyl (DULCOLAX) 5 MG EC tablet Take 5 mg by mouth daily as needed for Constipation      NONFORMULARY 30 mLs daily Prostat      guaiFENesin (MUCINEX) 600 MG extended release tablet Take 1,200 mg by mouth 2 times daily      vitamin D 1000 units CAPS Take 1 capsule by mouth daily      traZODone (DESYREL) 50 MG tablet Take 50 mg by mouth daily      menthol-zinc oxide (CALMOSEPTINE) 0.44-20.625 % OINT ointment Apply topically daily Max 30 ml per day.  omeprazole (PRILOSEC) 20 MG delayed release capsule Take 20 mg by mouth daily      magnesium citrate solution Take 296 mLs by mouth once as needed for Constipation      magnesium hydroxide (MILK OF MAGNESIA) 400 MG/5ML suspension Take by mouth daily as needed for Constipation      tolnaftate (TINACTIN) 1 % cream Apply topically 2 times daily       traMADol (ULTRAM ER) 300 MG extended releae tablet Take 300 mg by mouth daily.       rOPINIRole (REQUIP) 0.5 MG tablet Take 0.5 mg by mouth nightly       Multiple Vitamins-Minerals (THERAPEUTIC MULTIVITAMIN-MINERALS) tablet Take 1 tablet by mouth daily      furosemide (LASIX) 40 MG tablet Take 40 mg by mouth 2 times daily       citalopram (CELEXA) 40 MG tablet Take 40 mg by mouth daily       traMADol (ULTRAM) 50 MG tablet Take 50 mg by mouth nightly       ipratropium (ATROVENT) 0.03 % nasal spray 2 sprays by Nasal route 3 times daily as needed       bacitracin 500 UNIT/GM ophthalmic ointment Place into the left eye nightly       atorvastatin (LIPITOR) 10 MG tablet Take 10 mg by mouth nightly       acetaminophen (TYLENOL) 325 MG tablet Take 325 mg by mouth every 12 hours as needed for Pain       loratadine (CLARITIN) 10 MG tablet Take 10 mg by mouth nightly       baclofen (LIORESAL) 10 MG tablet Take 10 mg by mouth 3 times daily as needed       valACYclovir (VALTREX) 2/25/2020  3:05 PM   Undermining Ends___ O'Clock 0 2/25/2020  3:05 PM   Undermining Maxium Distance (cm) 0 2/25/2020  3:05 PM   Wound Assessment Drainage;Red;Slough; Yellow;Granulation tissue 2/25/2020  3:05 PM   Drainage Amount Moderate 2/25/2020  3:05 PM   Drainage Description Serosanguinous 2/25/2020  3:05 PM   Odor None 2/25/2020  3:05 PM   Margins Attached edges 2/25/2020  3:05 PM   Sabine-wound Assessment Dry;Pink 2/25/2020  3:05 PM   Non-staged Wound Description Not applicable 0/36/0491  4:82 PM   [place]%Wound Bed 0 2/25/2020  3:05 PM   Red%Wound Bed 95 2/25/2020  3:05 PM   Yellow%Wound Bed 5 2/25/2020  3:05 PM   Black%Wound Bed 0 2/25/2020  3:05 PM   Purple%Wound Bed 0 2/25/2020  3:05 PM   Other%Wound Bed 0 2/11/2020  2:04 PM   Number of days: 866             Estimated Blood Loss:  Minimal    Hemostasis Achieved:  by pressure    Procedural Pain:  0  / 10     Post Procedural Pain:  0 / 10     Response to treatment:  Well tolerated by patient. Plan:     Problem List Items Addressed This Visit     * (Principal) Decubitus ulcer of sacral region, stage 4 ([address] - Primary (Chronic)          Treatment Note please see attached Discharge Instructions    In my professional opinion this patient would benefit from HBO Therapy: No    Written patient dismissal instructions given to patient and signed by patient or POA. Discharge Instructions       Visit Discharge/Physician Orders    Discharge condition: Stable    Discharge to: Home    Left via:public transportation    Accompanied by:  self    ECF/HHA: [facility]-       Dressing Orders: Coccyx: Apply crusting to periwound or REFUGIO cream of barrier cream to periwound -irritated area. Wash with soap and water. Apply Promogran to wound bed. Cover with Silver Alginate or Aquacell AG to wound bed. Cover with ABD. Secure with hytape. Change twice daily .     Apply Tinactin to both feet twice daily. Treatment Orders:   Avoid Pressure to wound site.   Turn frequently (turn at least every two hours when in bed)     Off-load heels by applying heel-lift boots or \"float\" heels by placing pillows under calves so that heels do not touch mattress.     Continue Protein rich diet (unless restricted by your physician). Please continue Protein supplements.     Take Multivitamin daily     Please use low air loss bed  Please use ROHO Cushion in wheelchair    Coccyx wound measurements: 1 x 0.8 x 0.7    WCC followup visit ________2 weeks_____________________  (Please note your next appointment above and if you are unable to keep, kindly give a 24 hour notice. Thank you.)          If you experience any of the following, please call the [facility] during business hours:    * Increase in Pain  * Temperature over 101  * Increase in drainage from your wound  * Drainage with a foul odor  * Bleeding  * Increase in swelling  * Need for compression bandage changes due to slippage, breakthrough drainage. If you need medical attention outside of the business hours of the [facility] please contact your PCP or go to the nearest emergency room.           Electronically signed by Kirill Aaron MD on 2/26/2020 at 5:55 AM

## 2020-03-10 ENCOUNTER — HOSPITAL ENCOUNTER (OUTPATIENT)
Dept: WOUND CARE | Age: 80
Discharge: HOME | End: 2020-03-10
Payer: MEDICARE

## 2020-03-10 VITALS
SYSTOLIC BLOOD PRESSURE: 118 MMHG | BODY MASS INDEX: 28.64 KG/M2 | HEIGHT: 68 IN | HEART RATE: 76 BPM | DIASTOLIC BLOOD PRESSURE: 78 MMHG | WEIGHT: 189 LBS | RESPIRATION RATE: 18 BRPM | TEMPERATURE: 97.3 F

## 2020-03-10 PROCEDURE — 11042 DBRDMT SUBQ TIS 1ST 20SQCM/<: CPT | Performed by: SURGERY

## 2020-03-10 PROCEDURE — 11042 DBRDMT SUBQ TIS 1ST 20SQCM/<: CPT

## 2020-03-10 RX ORDER — CHOLECALCIFEROL (VITAMIN D3) 125 MCG
10 CAPSULE ORAL NIGHTLY
COMMUNITY
End: 2022-08-16

## 2020-03-10 ASSESSMENT — PAIN SCALES - GENERAL: PAINLEVEL_OUTOF10: 0

## 2020-03-10 NOTE — PROGRESS NOTES
[Fluocinolone]     Codeine Phosphate [Codeine] Nausea Only    Quinolones        MEDICATIONS    Current Outpatient Medications on File Prior to Encounter   Medication Sig Dispense Refill    melatonin 5 MG TABS tablet Take 10 mg by mouth nightly      guaiFENesin (MUCINEX) 600 MG extended release tablet Take 1,200 mg by mouth 2 times daily      vitamin D 1000 units CAPS Take 1 capsule by mouth daily      traZODone (DESYREL) 50 MG tablet Take 50 mg by mouth daily      menthol-zinc oxide (CALMOSEPTINE) 0.44-20.625 % OINT ointment Apply topically daily Max 30 ml per day.  omeprazole (PRILOSEC) 20 MG delayed release capsule Take 20 mg by mouth daily      tolnaftate (TINACTIN) 1 % cream Apply topically nightly       traMADol (ULTRAM ER) 300 MG extended releae tablet Take 300 mg by mouth daily.       rOPINIRole (REQUIP) 0.5 MG tablet Take 0.5 mg by mouth nightly       Multiple Vitamins-Minerals (THERAPEUTIC MULTIVITAMIN-MINERALS) tablet Take 1 tablet by mouth daily      furosemide (LASIX) 40 MG tablet Take 40 mg by mouth 2 times daily       citalopram (CELEXA) 40 MG tablet Take 40 mg by mouth daily       traMADol (ULTRAM) 50 MG tablet Take 50 mg by mouth nightly       bacitracin 500 UNIT/GM ophthalmic ointment Place into the left eye nightly       atorvastatin (LIPITOR) 10 MG tablet Take 10 mg by mouth nightly       loratadine (CLARITIN) 10 MG tablet Take 10 mg by mouth nightly       valACYclovir (VALTREX) 500 MG tablet Take 500 mg by mouth 2 times daily      docusate sodium (COLACE) 100 MG capsule Take 200 mg by mouth 2 times daily       bisacodyl (DULCOLAX) 5 MG EC tablet Take 5 mg by mouth daily as needed for Constipation      NONFORMULARY 30 mLs daily Prostat      magnesium citrate solution Take 296 mLs by mouth once as needed for Constipation      magnesium hydroxide (MILK OF MAGNESIA) 400 MG/5ML suspension Take by mouth daily as needed for Constipation      ipratropium (ATROVENT) 0.03 % Addressed This Visit     * (Principal) Decubitus ulcer of sacral region, stage 4 (HCC) (Chronic)    Malnutrition ([address] - Primary (Chronic)    Incontinence of feces (Chronic)           Procedure Note  Indications:  Based on my examination of this patient's wound(s)/ulcer(s) today, debridement is required to promote healing and evaluate the wound base. Performed by: Vinny Decker MD    Consent obtained:  Yes    Time out taken:  Yes    Pain Control: Anesthetic  Anesthetic: 2% Lidocaine Gel Topical       Debridement:Excisional Debridement    Using curette the wound(s)/ulcer(s) was/were sharply debrided down through and including the removal of epidermis, dermis and subcutaneous tissue.         Devitalized Tissue Debrided:  fibrin, biofilm, slough, necrotic/eschar and exudate      Pre Debridement Measurements:  Are located in the Wound/Ulcer Documentation Flow Sheet    Wound/Ulcer #: 1    Percent of Wound(s)/Ulcer(s) Debrided: 100%    Total Surface Area Debrided:  0.63 sq cm       Diabetic/Pressure/Non Pressure Ulcers only:  Ulcer: Pressure ulcer, Stage 4           Post Debridement Measurements:    Wound/Ulcer Descriptions are Pre Debridement --EXCEPT MEASUREMENTS    Wound 10/12/17 Coccyx Wound 1, Pressure Stage 4, Coccyx (Active)   Wound Image   3/10/2020  1:33 PM   Wound Pressure Stage  4 3/10/2020  1:33 PM   Dressing Status Old drainage 3/10/2020  1:33 PM   Dressing Changed Changed/New 2/11/2020  2:48 PM   Dressing/Treatment Alginate with Ag;ABD 2/11/2020  2:48 PM   Wound Cleansed Rinsed/Irrigated with saline 3/10/2020  1:33 PM   Wound Length (cm) 0.9 cm 3/10/2020  1:33 PM   Wound Width (cm) 0.7 cm 3/10/2020  1:33 PM   Wound Depth (cm) 0.9 cm 3/10/2020  1:33 PM   Wound Surface Area (cm^2) 0.63 cm^2 3/10/2020  1:33 PM   Change in Wound Size % (l*w) 88.95 3/10/2020  1:33 PM   Wound Volume (cm^3) 0.57 cm^3 3/10/2020  1:33 PM   Wound Healing % 96 3/10/2020  1:33 PM   Post-Procedure Length (cm) 0.9 cm 3/10/2020  1:47 PM   Post-Procedure Width (cm) 0.7 cm 3/10/2020  1:47 PM   Post-Procedure Depth (cm) 0.9 cm 3/10/2020  1:47 PM   Post-Procedure Surface Area (cm^2) 0.63 cm^2 3/10/2020  1:47 PM   Post-Procedure Volume (cm^3) 0.57 cm^3 3/10/2020  1:47 PM   Distance Tunneling (cm) 0 cm 3/10/2020  1:33 PM   Tunneling Position ___ O'Clock 0 3/10/2020  1:33 PM   Undermining Starts ___ O'Clock 0 3/10/2020  1:33 PM   Undermining Ends___ O'Clock 0 3/10/2020  1:33 PM   Undermining Maxium Distance (cm) 0 3/10/2020  1:33 PM   Wound Assessment Bleeding;Red;Slough; Yellow 3/10/2020  1:33 PM   Drainage Amount Moderate 3/10/2020  1:33 PM   Drainage Description Serosanguinous 3/10/2020  1:33 PM   Odor None 3/10/2020  1:33 PM   Margins Attached edges 3/10/2020  1:33 PM   Sabine-wound Assessment Pink 3/10/2020  1:33 PM   Non-staged Wound Description Not applicable 8/17/6294  6:96 PM   [place]%Wound Bed 0 3/10/2020  1:33 PM   Red%Wound Bed 95 3/10/2020  1:33 PM   Yellow%Wound Bed 5 3/10/2020  1:33 PM   Black%Wound Bed 0 3/10/2020  1:33 PM   Purple%Wound Bed 0 3/10/2020  1:33 PM   Other%Wound Bed 0 3/10/2020  1:33 PM   Number of days: 880             Estimated Blood Loss:  Minimal    Hemostasis Achieved:  by pressure    Procedural Pain:  0  / 10     Post Procedural Pain:  0 / 10     Response to treatment:  Well tolerated by patient. Plan:     Problem List Items Addressed This Visit     * (Principal) Decubitus ulcer of sacral region, stage 4 (HCC) (Chronic)    Malnutrition ([address] - Primary (Chronic)    Incontinence of feces (Chronic)          Push protein currently maybe 50grams per day on a good day. Need at least 75 grams per day    Treatment Note please see attached Discharge Instructions    In my professional opinion this patient would benefit from HBO Therapy: No    Written patient dismissal instructions given to patient and signed by patient or POA.          Discharge Instructions       Visit Discharge/Physician Orders    Discharge condition:

## 2020-03-10 NOTE — PLAN OF CARE
Problem: Wound:  Goal: Will show signs of wound healing; wound closure and no evidence of infection  Description: Will show signs of wound healing; wound closure and no evidence of infection   Outcome: Ongoing     Problem: Pressure Ulcer:  Goal: Signs of wound healing will improve  Description: Signs of wound healing will improve   Outcome: Ongoing  Goal: Absence of new pressure ulcer  Description: Absence of new pressure ulcer   Outcome: Ongoing  Goal: Will show no infection signs and symptoms  Description: Will show no infection signs and symptoms   Outcome: Ongoing

## 2020-05-07 ENCOUNTER — TELEPHONE (OUTPATIENT)
Dept: CARDIOLOGY | Age: 80
End: 2020-05-07

## 2020-06-04 ENCOUNTER — TELEPHONE (OUTPATIENT)
Dept: CARDIOLOGY | Age: 80
End: 2020-06-04

## 2020-06-08 PROCEDURE — 93294 REM INTERROG EVL PM/LDLS PM: CPT | Performed by: NURSE PRACTITIONER

## 2020-06-08 PROCEDURE — 93296 REM INTERROG EVL PM/IDS: CPT | Performed by: NURSE PRACTITIONER

## 2020-07-20 ENCOUNTER — HOSPITAL ENCOUNTER (OUTPATIENT)
Dept: WOUND CARE | Age: 80
Discharge: HOME | End: 2020-07-20

## 2020-08-12 ENCOUNTER — TELEPHONE (OUTPATIENT)
Dept: CARDIOLOGY | Age: 80
End: 2020-08-12

## 2020-08-12 NOTE — TELEPHONE ENCOUNTER
Nursing home called and would like to see if changed her appointment to a vvvisit,please called [phone]

## 2020-08-13 ENCOUNTER — TELEMEDICINE (OUTPATIENT)
Dept: CARDIOLOGY | Age: 80
End: 2020-08-13
Payer: MEDICARE

## 2020-08-13 PROCEDURE — 93294 REM INTERROG EVL PM/LDLS PM: CPT | Performed by: NURSE PRACTITIONER

## 2020-08-13 PROCEDURE — 93296 REM INTERROG EVL PM/IDS: CPT | Performed by: NURSE PRACTITIONER

## 2020-08-13 PROCEDURE — G2012 BRIEF CHECK IN BY MD/QHP: HCPCS | Performed by: NURSE PRACTITIONER

## 2020-08-13 RX ORDER — ATORVASTATIN CALCIUM 20 MG/1
20 TABLET, FILM COATED ORAL DAILY
COMMUNITY
Start: 2020-05-12

## 2020-08-13 NOTE — PATIENT INSTRUCTIONS
New instructions for today:  Carelink pacemaker transmission:    1)  Your next scheduled transmission from home is 11/16/20. The pacemaker nurse will call you after the report has been reviewed. Follow the steps for sending your first transmission every time you are scheduled to send information to the clinic. 2) If you have questions about your monitor, call the office during hours of operation at [phone]. You can also call [facility] at [phone], Monday - Friday 7AM-6PM Central Time. 3)  If you have a new pacemaker, you should receive the monitor within a few weeks. If you have not received the box within 30 days, notify the office. Instructions for use will be included in the box. Patient Instructions:  Continue current medications as prescribed. Always keep a current medication list. Bring your medications to every office visit. Continue to follow up with primary care provider for non cardiac medical problems. Call the office with any problems, questions or concerns at [phone]. If you have been asked to keep a blood pressure log, do so for 2 weeks. Call the office to report readings to the triage nurse at [phone]. Follow up with cardiologist as scheduled. Dr. Crystal Last 6 months. The following educational material has been included in this after visit summary for your review: Life simple 7. Heart health. Life simple 7  1) Manage blood pressure - high blood pressure is a major risk factor for heart disease and stroke. Keeping blood pressure in health range reduces strain on your heart, arteries and kidneys. Blood pressure goal is less than 130/80. 2) Control cholesterol - contributes to plaque, which can clog arteries and lead to heart disease and stroke. When you control your cholesterol you are giving your arteries their best chance to remain clear. It is recommended that you get cholesterol lab work done once a year.   3) Reduce blood sugar - most of the food we eat is turning into glucose or blood sugar that our body uses for energy. Over time, high levels of blood sugar can damage your heart, kidneys, eyes and nerves. 4) Get active - living an active life is one of the most rewarding gifts you can give yourself and those you love. Simply put, daily physical activity increases your length and quality of life. Strive to exercise 15 minutes most days of the week. 5)  Eat better - A healthy diet is one of your best weapons for fighting cardiovascular disease. When you eat a heart healthy diet, you improve your chances for feeling good and staying healthy for life. 6)  Lose weight - when you shed extra fat an unnecessary pounds, you reduce the burden on your hear, lungs, blood vessels and skeleton. You give yourself the gift of active living, you lower your blood pressure and help yourself feel better. 7) Stop smoking - cigarette smokers have a higher risk of developing cardiovascular disease. If  You smoke, quitting is the best thing you can do for your health. Check American Heart Association on line for more information on Life's Simple 7 and tips for healthy living. A Healthy Heart: Care Instructions  Your Care Instructions     Coronary artery disease, also called heart disease, occurs when a substance called plaque builds up in the vessels that supply oxygen-rich blood to your heart muscle. This can narrow the blood vessels and reduce blood flow. A heart attack happens when blood flow is completely blocked. A high-fat diet, smoking, and other factors increase the risk of heart disease. Your doctor has found that you have a chance of having heart disease. You can do lots of things to keep your heart healthy. It may not be easy, but you can change your diet, exercise more, and quit smoking. These steps really work to lower your chance of heart disease. Follow-up care is a key part of your treatment and safety.  Be sure to make and go to all appointments, and call your doctor if you are having problems. It's also a good idea to know your test results and keep a list of the medicines you take. How can you care for yourself at home? Diet  · Use less salt when you cook and eat. This helps lower your blood pressure. Taste food before salting. Add only a little salt when you think you need it. With time, your taste buds will adjust to less salt. · Eat fewer snack items, fast foods, canned soups, and other high-salt, high-fat, processed foods. · Read food labels and try to avoid saturated and trans fats. They increase your risk of heart disease by raising cholesterol levels. · Limit the amount of solid fat-butter, margarine, and shortening-you eat. Use olive, peanut, or canola oil when you cook. Bake, broil, and steam foods instead of frying them. · Eat a variety of fruit and vegetables every day. Dark green, deep orange, red, or yellow fruits and vegetables are especially good for you. Examples include spinach, carrots, peaches, and berries. · Foods high in fiber can reduce your cholesterol and provide important vitamins and minerals. High-fiber foods include whole-grain cereals and breads, oatmeal, beans, brown rice, citrus fruits, and apples. · Eat lean proteins. Heart-healthy proteins include seafood, lean meats and poultry, eggs, beans, peas, nuts, seeds, and soy products. · Limit drinks and foods with added sugar. These include candy, desserts, and soda pop. Lifestyle changes  · If your doctor recommends it, get more exercise. Walking is a good choice. Bit by bit, increase the amount you walk every day. Try for at least 30 minutes on most days of the week. You also may want to swim, bike, or do other activities. · Do not smoke. If you need help quitting, talk to your doctor about stop-smoking programs and medicines. These can increase your chances of quitting for good.  Quitting smoking may be the most important step you can take to protect your heart. It is never too late to quit. · Limit alcohol to 2 drinks a day for men and 1 drink a day for women. Too much alcohol can cause health problems. · Manage other health problems such as diabetes, high blood pressure, and high cholesterol. If you think you may have a problem with alcohol or drug use, talk to your doctor. Medicines  · Take your medicines exactly as prescribed. Call your doctor if you think you are having a problem with your medicine. · If your doctor recommends aspirin, take the amount directed each day. Make sure you take aspirin and not another kind of pain reliever, such as acetaminophen (Tylenol). When should you call for help? [id number] if you have symptoms of a heart attack. These may include:  · Chest pain or pressure, or a strange feeling in the chest.  · Sweating. · Shortness of breath. · Pain, pressure, or a strange feeling in the back, neck, jaw, or upper belly or in one or both shoulders or arms. · Lightheadedness or sudden weakness. · A fast or irregular heartbeat. After you call 911, the  may tell you to chew 1 adult-strength or 2 to 4 low-dose aspirin. Wait for an ambulance. Do not try to drive yourself. Watch closely for changes in your health, and be sure to contact your doctor if you have any problems. Where can you learn more? Go to https://[email or web address]. org and sign in to your [employer] account. Enter P684 in the [facility] box to learn more about \"A Healthy Heart: Care Instructions. \"     If you do not have an account, please click on the \"Sign Up Now\" link. Current as of: December 16, 2019               Content Version: 12.5  © 5738-8617 Healthwise, zanda. Care instructions adapted under license by [facility] ([facility]).  If you have questions about a medical condition or this instruction, always ask your healthcare professional. [address] any warranty or liability for your use of this information.

## 2020-08-13 NOTE — PROGRESS NOTES
Jerrell Villanueva is a 79 y.o. female evaluated via telephone on 8/13/2020. Consent:  She and/or health care decision maker is aware that that she may receive a bill for this telephone service, depending on her insurance coverage, and has provided verbal consent to proceed: Yes    Documentation:  I communicated with the patient and/or health care decision maker about cardiac issues. Details of this discussion including any medical advice provided: heart heatlh. I affirm this is a Patient Initiated Episode with an Established Patient who has not had a related appointment within my department in the past 7 days or scheduled within the next 24 hours. Total Time: minutes: 5-10 minutes    Note: not billable if this call serves to triage the patient into an appointment for the relevant concern    Alida Wolfe      8/13/2020    Audio Patient Encouter(During JAY-70 public health emergency)  The telephone encourter was conducted with patient in their residence from [address] with CAMILLE Perez; assistance by Parker Carrillo MA.    HPI:  [place] Cryer   Diagnosis Orders   1. SSS (sick sinus syndrome) ([facility] [address]     2. Mixed hyperlipidemia     3. Cardiac pacemaker       The patient presents today for audio evaluation today. She is doing very well with no cardiac related issues. A Carelink transmission was sent today but has not been scanned in Epic as of yet. Last Carelink in June showed adequate battery status, diagnostics and safety margins. The patient denies symptoms to suggest myocardial ischemia, heart failure or arrhythmia. BP is well controlled on current regimen. The patient's PCP monitors cholesterol. /60. Dr. Kenyon Sutherland checks labs. SUBJECTIVE:  Wilbert Soria denies exertional chest pain, shortness of breath, orthopnea, paroxysmal nocturnal dyspnea, syncope, presyncope, sensed arrhythmia, edema and fatigue.   The patient denies numbness or weakness to suggest cerebrovascular accident or transient ischemic attack. Review of Systems    Prior to Visit Medications    Medication Sig Taking? Authorizing Provider   atorvastatin (LIPITOR) 20 MG tablet Take 20 mg by mouth daily  Yes Historical Provider, MD   melatonin 5 MG TABS tablet Take 10 mg by mouth nightly Yes Historical Provider, MD   bisacodyl (DULCOLAX) 5 MG EC tablet Take 5 mg by mouth daily as needed for Constipation Yes Historical Provider, MD   guaiFENesin (MUCINEX) 600 MG extended release tablet Take 1,200 mg by mouth 2 times daily Yes Historical Provider, MD   vitamin D 1000 units CAPS Take 1 capsule by mouth daily Yes Historical Provider, MD   traZODone (DESYREL) 50 MG tablet Take 50 mg by mouth daily Yes Historical Provider, MD   menthol-zinc oxide (CALMOSEPTINE) 0.44-20.625 % OINT ointment Apply topically daily Max 30 ml per day. Yes Historical Provider, MD   omeprazole (PRILOSEC) 20 MG delayed release capsule Take 20 mg by mouth daily Yes Historical Provider, MD   magnesium citrate solution Take 296 mLs by mouth once as needed for Constipation Yes Historical Provider, MD   tolnaftate (TINACTIN) 1 % cream Apply topically nightly  Yes Historical Provider, MD   traMADol (ULTRAM ER) 300 MG extended releae tablet Take 300 mg by mouth daily.  Yes Historical Provider, MD   rOPINIRole (REQUIP) 0.5 MG tablet Take 0.5 mg by mouth nightly  Yes Historical Provider, MD   Multiple Vitamins-Minerals (THERAPEUTIC MULTIVITAMIN-MINERALS) tablet Take 1 tablet by mouth daily Yes Historical Provider, MD   furosemide (LASIX) 40 MG tablet Take 40 mg by mouth 2 times daily  Yes Historical Provider, MD   citalopram (CELEXA) 40 MG tablet Take 40 mg by mouth daily  Yes Historical Provider, MD   traMADol (ULTRAM) 50 MG tablet Take 50 mg by mouth nightly  Yes Historical Provider, MD   ipratropium (ATROVENT) 0.03 % nasal spray 2 sprays by Nasal route 3 times daily as needed  Yes Historical Provider, MD   bacitracin 500 UNIT/GM ophthalmic ointment Place into the left eye nightly  Yes Historical Provider, MD   acetaminophen (TYLENOL) 325 MG tablet Take 325 mg by mouth every 12 hours as needed for Pain  Yes Historical Provider, MD   loratadine (CLARITIN) 10 MG tablet Take 10 mg by mouth nightly  Yes Historical Provider, MD   baclofen (LIORESAL) 10 MG tablet Take 10 mg by mouth 3 times daily as needed  Yes Historical Provider, MD   docusate sodium (COLACE) 100 MG capsule Take 200 mg by mouth 2 times daily  Yes Historical Provider, MD       Social History     Tobacco Use    Smoking status: Former Smoker     Types: Cigarettes    Smokeless tobacco: Never Used   Substance Use Topics    Alcohol use: No    Drug use: No        REVIEW OF SYSTEMS:  Constitutional - no appetite change, or unexpected weight change. No fever, chills or diaphoresis. No significant change in activity level or new onset of fatigue. HEENT - no significant rhinorrhea or epistaxis. No tinnitus or significant hearing loss. Eyes - no sudden vision change or amaurosis. No corneal arcus, xantholasma, subconjunctival hemorrhage or discharge. Respiratory - no significant wheezing, stridor, apnea or cough. No dyspnea on exertion or shortness of air. Cardiovascular - no exertional chest pain to suggest myocardial ischemia. No orthopnea or PND. No sensation of sustained arrythmia. No occurrence of slow heart rate. No palpitations. No claudication. Gastrointestinal - no abdominal swelling or pain. No blood in stool. No severe constipation, diarrhea, nausea, or vomiting. Genitourinary - no dysuria, frequency, or urgency. No flank pain or hematuria. Musculoskeletal - no back pain or myalgia. No problems with gait. Extremities - no clubbing, cyanosis or extremity edema. Skin - no color change or rash. No pallor. No new surgical incision. Neurologic - no speech difficulty, facial asymmetry or lateralizing weakness. No seizures, presyncope or syncope.   No significant dizziness. Hematologic - no easy bruising or excessive bleeding. Psychiatric - no severe anxiety or insomnia. No confusion. All other review of systems are negative. ASSESSMENT:   Diagnosis Orders   1. SSS (sick sinus syndrome) ([facility] [address]     2. Mixed hyperlipidemia     3. Cardiac pacemaker       Data Reviewed:    6/5/20 Carelink transmission reviewed. PlAN:  Continue current medical management. Continue current medications as prescribed. Continue to follow up with primary care provider for non cardiac medical problems. Call the office with any problems, questions or concerns at [phone]. Follow up with cardiologist as scheduled. Dr. Valente Love and lobo check in 6 months. The following educational material has been included in this after visit summary for patient review:  Heart healthJunior Roca is a 79 y.o. female being evaluated by a telephone encounter to address concerns as mentioned above. A caregiver was present when appropriate. Due to this being a TeleHealth encounter (During [facility]- public health emergency). Pursuant to the emergency declaration under the [address] [place] [address] authority and the [employer] and [employer] Act, this Virtual Visit was conducted with patient's (and/or legal guardian's) consent, to reduce the patient's risk of exposure to COVID-19 and provide necessary medical care. The patient (and/or legal guardian) has also been advised to contact this office for worsening conditions or problems, and seek emergency medical treatment and/or call 911 if deemed necessary. Services were provided through a telephone discussion virtually to substitute for in-person clinic visit. Patient and provider were located at their individual homes. --CAMILLE Brunner on 8/13/2020 at 2:20 PM    An electronic signature was used to authenticate this note.

## 2020-08-14 ENCOUNTER — HOSPITAL ENCOUNTER (OUTPATIENT)
Dept: WOUND CARE | Age: 80
Discharge: HOME | End: 2020-08-14
Payer: MEDICARE

## 2020-08-14 PROCEDURE — 99212 OFFICE O/P EST SF 10 MIN: CPT | Performed by: SURGERY

## 2020-08-14 PROCEDURE — 99211 OFF/OP EST MAY X REQ PHY/QHP: CPT

## 2020-08-14 RX ORDER — BACITRACIN, NEOMYCIN, POLYMYXIN B 400; 3.5; 5 [USP'U]/G; MG/G; [USP'U]/G
OINTMENT TOPICAL ONCE
Status: CANCELLED | OUTPATIENT
Start: 2020-08-14

## 2020-08-14 RX ORDER — LIDOCAINE HYDROCHLORIDE 40 MG/ML
SOLUTION TOPICAL ONCE
Status: CANCELLED | OUTPATIENT
Start: 2020-08-14

## 2020-08-14 RX ORDER — GENTAMICIN SULFATE 1 MG/G
OINTMENT TOPICAL ONCE
Status: CANCELLED | OUTPATIENT
Start: 2020-08-14

## 2020-08-14 RX ORDER — GINSENG 100 MG
CAPSULE ORAL ONCE
Status: CANCELLED | OUTPATIENT
Start: 2020-08-14

## 2020-08-14 RX ORDER — LIDOCAINE HYDROCHLORIDE 20 MG/ML
JELLY TOPICAL ONCE
Status: CANCELLED | OUTPATIENT
Start: 2020-08-14

## 2020-08-14 RX ORDER — BACITRACIN ZINC AND POLYMYXIN B SULFATE 500; 1000 [USP'U]/G; [USP'U]/G
OINTMENT TOPICAL ONCE
Status: CANCELLED | OUTPATIENT
Start: 2020-08-14

## 2020-08-14 NOTE — PROGRESS NOTES
Virtual Visit Via [employer]   Progress Note and Procedure Note    Brigida Adames  MEDICAL RECORD NUMBER:  [id number]  AGE: 79 y.o. GENDER: female  : 1940  EPISODE DATE:  2020    Subjective:     No chief complaint on file. Consent obtained to communicate via Virtual Visit:  Yes     HISTORY of PRESENT ILLNESS HPI     Brigida Adames is a 79 y.o. female who presents today via Virtual Visit wound/ulcer evaluation.    History of Wound Context: Pt with stage 4 coccyx ulcer being seen by virtual visit for eval/treat  Wound/Ulcer Pain Timing/Severity: none  Quality of pain: N/A  Severity:  0 / 10   Modifying Factors: None  Associated Signs/Symptoms: none    Ulcer Identification:  Ulcer Type: pressure    Contributing Factors: chronic pressure, decreased mobility and shear force    Acute Wound: N/A not an acute wound    PAST MEDICAL HISTORY        Diagnosis Date    CAD (coronary artery disease)     Depressive disorder     GERD (gastroesophageal reflux disease)     bleeding ulcer    Hemorrhage of gastrointestinal tract     Hyperlipidemia     Hypertension     Osteoarthrosis     Pacemaker     Retention of fluid     Sick sinus syndrome (HCC)     Sinus node dysfunction (HCC)        PAST SURGICAL HISTORY    Past Surgical History:   Procedure Laterality Date    APPENDECTOMY      BACK SURGERY      CHOLECYSTECTOMY      COLONOSCOPY      DILATATION, ESOPHAGUS      EYE SURGERY      HYSTERECTOMY      PACEMAKER PLACEMENT  2010    [employer]       FAMILY HISTORY    Family History   Problem Relation Age of Onset    Heart Disease Mother     Heart Disease Father        SOCIAL HISTORY    Social History     Tobacco Use    Smoking status: Former Smoker     Types: Cigarettes    Smokeless tobacco: Never Used   Substance Use Topics    Alcohol use: No    Drug use: No       ALLERGIES    Allergies   Allergen Reactions    Morphine Sulfate [Morphine] Anaphylaxis     Pt

## 2020-08-14 NOTE — PROGRESS NOTES
Virtual Visit Wound Care  Clinic Level of Care   NAME:  Flor Granger  YOB: 1940 GENDER: female  MEDICAL RECORD NUMBER:  [id number]   DATE:  8/14/2020     Patient Type Points   No documentation completed by nursing staff. []   0   Nursing staff documented in the navigator for an ESTABLISHED patient including Episode, Patient ID, Chief Complaint, Travel Screen, Allergies, Latex Allergy, Home Medication, History, Psychosocial Screen, C-SSRS Screen, Fall Risk, Nutritional Screen, Advanced Directive, Education and Plan of Care, and Discharge Instructions. The Functional Screening tab is only required if the patient's status changes. [x]   1   Nursing staff documented in the navigator for a NEW patient including Patient ID, Chief Complaint, Travel Screen, Allergies, Latex Allergy, Home Medication, History, Psychosocial Screen, C-SSRS Screen, Fall Risk, Nutritional Screen, Advanced Directive, Functional Screen, Education and Plan of Care, and Discharge Instructions. []   2   Nursing staff documented in the navigator for a CONSULT patient including Episode, Patient ID, Chief Complaint, Travel Screen, Allergies, Latex Allergy, Home Medication, History, Psychosocial Screen, C-SSRS Screen, Fall Risk, Nutritional Screen, Advanced Directive, Functional Screen, Education and Plan of Care, and Discharge Instructions. []   2     Wound Description Points   Unable to obtain image of Wound. For example, patient/caregiver is instructed not to remove dressing, is unable to correctly position smart phone, no smart phone is available, patient is unable to maintain connectivity or the patient's wound is healed. []   0   1-3 wound images annotated. Images of the wound(s) is obtained and annotated along with completed description in [address] [place] [address]. [x]   1   4-5 wound images annotated. Images of the wound(s) is obtained and annotated along with completed description in [address] [place] [address]. []   2   Greater than 6 wound images annotated.  Images of

## 2020-08-21 ENCOUNTER — HOSPITAL ENCOUNTER (OUTPATIENT)
Dept: WOUND CARE | Age: 80
Discharge: HOME | End: 2020-08-21
Payer: MEDICARE

## 2020-08-21 VITALS — BODY MASS INDEX: 28.64 KG/M2 | HEIGHT: 68 IN | WEIGHT: 189 LBS

## 2020-08-21 PROBLEM — M86.8X8 OTHER OSTEOMYELITIS, OTHER SITE (HCC): Status: ACTIVE | Noted: 2018-02-06

## 2020-08-21 PROBLEM — E46 MALNUTRITION (HCC): Status: ACTIVE | Noted: 2017-10-12

## 2020-08-21 PROBLEM — Z87.828 H/O BACK INJURY: Status: ACTIVE | Noted: 2017-10-12

## 2020-08-21 PROCEDURE — 99212 OFFICE O/P EST SF 10 MIN: CPT

## 2020-08-21 PROCEDURE — 99212 OFFICE O/P EST SF 10 MIN: CPT | Performed by: SURGERY

## 2020-08-21 ASSESSMENT — PAIN DESCRIPTION - ONSET: ONSET: ON-GOING

## 2020-08-21 ASSESSMENT — PAIN DESCRIPTION - PROGRESSION: CLINICAL_PROGRESSION: GRADUALLY IMPROVING

## 2020-08-21 ASSESSMENT — PAIN DESCRIPTION - PAIN TYPE: TYPE: CHRONIC PAIN

## 2020-08-21 ASSESSMENT — PAIN DESCRIPTION - DESCRIPTORS: DESCRIPTORS: ACHING

## 2020-08-21 ASSESSMENT — PAIN DESCRIPTION - FREQUENCY: FREQUENCY: INTERMITTENT

## 2020-08-21 ASSESSMENT — PAIN SCALES - GENERAL: PAINLEVEL_OUTOF10: 0

## 2020-08-21 ASSESSMENT — PAIN DESCRIPTION - LOCATION: LOCATION: BACK;KNEE

## 2020-08-21 NOTE — PROGRESS NOTES
Virtual Visit Via [employer]   Progress Note and Procedure Note    Chichi Acuña  MEDICAL RECORD NUMBER:  [id number]  AGE: 79 y.o. GENDER: female  : 1940  EPISODE DATE:  2020    Subjective:     Chief Complaint   Patient presents with    Wound Check     virtual visit        Consent obtained to communicate via Virtual Visit:  Yes     HISTORY of PRESENT ILLNESS HPI     Chichi Acuña is a 79 y.o. female who presents today via Virtual Visit wound/ulcer evaluation.    History of Wound Context: Pt with sacral wound seeing me by virtual visit for eval/treat  Wound/Ulcer Pain Timing/Severity: none  Quality of pain: N/A  Severity:  0 / 10   Modifying Factors: None  Associated Signs/Symptoms: none    Ulcer Identification:  Ulcer Type: pressure    Contributing Factors: chronic pressure, decreased mobility and shear force    Acute Wound: N/A not an acute wound    PAST MEDICAL HISTORY        Diagnosis Date    CAD (coronary artery disease)     Depressive disorder     GERD (gastroesophageal reflux disease)     bleeding ulcer    Hemorrhage of gastrointestinal tract     Hyperlipidemia     Hypertension     Osteoarthrosis     Pacemaker     Retention of fluid     Sick sinus syndrome (HCC)     Sinus node dysfunction (HCC)        PAST SURGICAL HISTORY    Past Surgical History:   Procedure Laterality Date    APPENDECTOMY      BACK SURGERY      CHOLECYSTECTOMY      COLONOSCOPY      DILATATION, ESOPHAGUS      EYE SURGERY      HYSTERECTOMY      PACEMAKER PLACEMENT  2010    [employer]       FAMILY HISTORY    Family History   Problem Relation Age of Onset    Heart Disease Mother     Heart Disease Father        SOCIAL HISTORY    Social History     Tobacco Use    Smoking status: Former Smoker     Types: Cigarettes    Smokeless tobacco: Never Used   Substance Use Topics    Alcohol use: No    Drug use: No       ALLERGIES    Allergies   Allergen Reactions    Morphine Sulfate [Morphine] Anaphylaxis     Pt states she quit breathing      Ciprocinonide [Fluocinolone]     Codeine Phosphate [Codeine] Nausea Only    Quinolones        MEDICATIONS    Current Outpatient Medications on File Prior to Encounter   Medication Sig Dispense Refill    atorvastatin (LIPITOR) 20 MG tablet Take 20 mg by mouth daily       melatonin 5 MG TABS tablet Take 10 mg by mouth nightly      bisacodyl (DULCOLAX) 5 MG EC tablet Take 5 mg by mouth daily as needed for Constipation      guaiFENesin (MUCINEX) 600 MG extended release tablet Take 1,200 mg by mouth 2 times daily      vitamin D 1000 units CAPS Take 1 capsule by mouth daily      traZODone (DESYREL) 50 MG tablet Take 50 mg by mouth daily      menthol-zinc oxide (CALMOSEPTINE) 0.44-20.625 % OINT ointment Apply topically daily Max 30 ml per day.  omeprazole (PRILOSEC) 20 MG delayed release capsule Take 20 mg by mouth daily      magnesium citrate solution Take 296 mLs by mouth once as needed for Constipation      tolnaftate (TINACTIN) 1 % cream Apply topically nightly       traMADol (ULTRAM ER) 300 MG extended releae tablet Take 300 mg by mouth daily.       rOPINIRole (REQUIP) 0.5 MG tablet Take 0.5 mg by mouth nightly       Multiple Vitamins-Minerals (THERAPEUTIC MULTIVITAMIN-MINERALS) tablet Take 1 tablet by mouth daily      furosemide (LASIX) 40 MG tablet Take 40 mg by mouth 2 times daily       citalopram (CELEXA) 40 MG tablet Take 40 mg by mouth daily       traMADol (ULTRAM) 50 MG tablet Take 50 mg by mouth nightly       ipratropium (ATROVENT) 0.03 % nasal spray 2 sprays by Nasal route 3 times daily as needed       bacitracin 500 UNIT/GM ophthalmic ointment Place into the left eye nightly       acetaminophen (TYLENOL) 325 MG tablet Take 325 mg by mouth every 12 hours as needed for Pain       loratadine (CLARITIN) 10 MG tablet Take 10 mg by mouth nightly       baclofen (LIORESAL) 10 MG tablet Take 10 mg by mouth 3 times daily as needed       docusate sodium (COLACE) 100 MG capsule Take 200 mg by mouth 2 times daily        No current facility-administered medications on file prior to encounter. REVIEW OF SYSTEMS    A comprehensive review of systems was negative.     Objective:     PHYSICAL EXAM    General Appearance: alert and oriented to person, place and time, well developed and well- nourished, in no acute distress  Skin: warm and dry, no rash or erythema  Head: normocephalic and atraumatic  Eyes: pupils equal, round, and reactive to light, extraocular eye movements intact, conjunctivae normal  ENT: tympanic membrane, external ear and ear canal normal bilaterally, nose without deformity, nasal mucosa and turbinates normal without polyps  Neck: supple and non-tender without mass, no thyromegaly or thyroid nodules, no cervical lymphadenopathy  Pulmonary/Chest: clear to auscultation bilaterally- no wheezes, rales or rhonchi, normal air movement, no respiratory distress  Cardiovascular: normal rate, regular rhythm, normal S1 and S2, no murmurs, rubs, clicks, or gallops, distal pulses intact, no carotid bruits  Abdomen: soft, non-tender, non-distended, normal bowel sounds, no masses or organomegaly  Extremities: no cyanosis, clubbing or edema  Musculoskeletal: normal range of motion, no joint swelling, deformity or tenderness  Neurologic: reflexes normal and symmetric, no cranial nerve deficit, gait, coordination and speech normal    Assessment:      Problem List Items Addressed This Visit     * (Principal) Decubitus ulcer of sacral region, stage 4 (HCC) (Chronic)    Debilitated - Primary (Chronic)    Incontinence of feces (Chronic)    [facility]          Performed by: Jaleesa Hernandez MD    Wound/Ulcer #: 1    Wound 10/12/17 Coccyx Wound 1, Pressure Stage 4, Coccyx (Active)   Wound Image   08/14/20 1048   Wound Diabetic Wilks 4 08/14/20 1048   Dressing Status Old drainage 08/14/20 1048   Dressing Changed Changed/New 03/10/20 visit) encounter to address concerns as mentioned above. A caregiver was present when appropriate. Due to this being a TeleHealth encounter (During [facility]- public health emergency), evaluation of the following organ systems was limited: Vitals/Constitutional/EENT/Resp/CV/GI//MS/Neuro/Skin/Heme-Lymph-Imm. Pursuant to the emergency declaration under the [address] [place] [address] and the [employer] and [employer] Act, this Virtual Visit was conducted with patient's (and/or legal guardian's) consent, to reduce the patient's risk of exposure to COVID-19 and provide necessary medical care. The patient (and/or legal guardian) has also been advised to contact this office for worsening conditions or problems, and seek emergency medical treatment and/or call 911 if deemed necessary. Services were provided through a video synchronous discussion virtually to substitute for in-person clinic visit. Patient was located at their individual homes. Provider was located in [place] [address].     Electronically signed by Km Gant MD on 8/21/2020 at 11:39 AM

## 2020-08-21 NOTE — PLAN OF CARE
Virtual Visit Wound Care  Clinic Level of Care   NAME:  Karena Novak  YOB: 1940 GENDER: female  MEDICAL RECORD NUMBER:  [id number]   DATE:  8/21/2020     Patient Type Points   No documentation completed by nursing staff. []   0   Nursing staff documented in the navigator for an ESTABLISHED patient including Episode, Patient ID, Chief Complaint, Travel Screen, Allergies, Latex Allergy, Home Medication, History, Psychosocial Screen, C-SSRS Screen, Fall Risk, Nutritional Screen, Advanced Directive, Education and Plan of Care, and Discharge Instructions. The Functional Screening tab is only required if the patient's status changes. [x]   1   Nursing staff documented in the navigator for a NEW patient including Patient ID, Chief Complaint, Travel Screen, Allergies, Latex Allergy, Home Medication, History, Psychosocial Screen, C-SSRS Screen, Fall Risk, Nutritional Screen, Advanced Directive, Functional Screen, Education and Plan of Care, and Discharge Instructions. []   2   Nursing staff documented in the navigator for a CONSULT patient including Episode, Patient ID, Chief Complaint, Travel Screen, Allergies, Latex Allergy, Home Medication, History, Psychosocial Screen, C-SSRS Screen, Fall Risk, Nutritional Screen, Advanced Directive, Functional Screen, Education and Plan of Care, and Discharge Instructions. []   2     Wound Description Points   Unable to obtain image of Wound. For example, patient/caregiver is instructed not to remove dressing, is unable to correctly position smart phone, no smart phone is available, patient is unable to maintain connectivity or the patient's wound is healed. []   0   1-3 wound images annotated. Images of the wound(s) is obtained and annotated along with completed description in [address] [place] [address]. [x]   1   4-5 wound images annotated. Images of the wound(s) is obtained and annotated along with completed description in [address] [place] [address]. []   2   Greater than 6 wound images annotated.  Images of the wound(s) is obtained and annotated along with completed description in [address] [place] [address]. []   3     Education Points   No Education completed by nursing staff.    []   0   Patient/caregiver is educated on 1-4 topics. Nursing staff identifies learner, confirms understanding of information (verbal, demonstration, written) and documents details. May include Discharge Instructions/AVS, available documents in My Chart, or Web-based learning. [x]   1   Patient/caregiver is educated on 5-9 topics. Nursing staff identifies learner, confirms understanding of information (verbal, demonstration, written) and documents details. May include Discharge Instructions/AVS, available documents in My Chart, or Web-based learning. []   2   Patient/caregiver is educated on 10 or more topics. Nursing staff identifies learner, confirms understanding of information (verbal, demonstration, written) and documents details. May include Discharge Instructions/AVS, available documents in My Chart, or Web-based learning. []   3     Follow-up Virtual Visit Points   No contact with outside resources made. [x]   0   Nursing staff contacts 1-2 outside resource. For example, telephone call made to home health, primary care provider, pharmacy, or DME. May include filling out forms and writing letters, arranging transportation, communication with insurance , vendors, etc.  Discharge, instructions and/or After Visit Summary given to patient/caregiver and instructions completed. []   1   Nursing staff contacts 3-4 outside resource. For example, telephone calls made to home health, primary care provider, pharmacy, or DME. May include filling out forms and writing letters, arranging transportation, communication with insurance , vendors, etc.  Discharge, instructions and/or After Visit Summary given to patient/caregiver and instructions completed. []   2   Nursing contacts 5 or more outside resource.  For example, telephone calls made to home health, primary care providers, pharmacy, or DME. May include filling out forms and writing letters, arranging transportation, communication with insurance , vendors, etc.  Discharge, instructions and/or After Visit Summary given to patient/caregiver and instructions completed.    []   3     Is this the Patient's First Visit to the [address] [place] [address]  No    Is this Patient Established @ [facility]  Yes             Virtual Visit Clinical Level of Care Wound Care      Points  1-3  Level 1 []     Points  4-5  Level 2 [x]     Points  6-7  Level 3 []     Points  8-9  Level 4 []     Points  10-11  Level 5 []       Electronically signed by Kenna Mosqueda RN on 8/21/2020 at @NO

## 2020-08-21 NOTE — VIRTUAL HEALTH
Consults  [facility]  [facility] [address] [facility]    Provider Location ([facility]):   [address] [place] [address]    This virtual visit was conducted via interactive/real-time audio/video.

## 2020-09-01 ENCOUNTER — HOSPITAL ENCOUNTER (OUTPATIENT)
Dept: WOUND CARE | Age: 80
Discharge: HOME | End: 2020-09-01

## 2020-09-04 ENCOUNTER — HOSPITAL ENCOUNTER (OUTPATIENT)
Dept: WOUND CARE | Age: 80
Discharge: HOME | End: 2020-09-04
Payer: MEDICARE

## 2020-09-04 ENCOUNTER — LAB REQUISITION (OUTPATIENT)
Dept: LAB | Facility: HOSPITAL | Age: 80
End: 2020-09-04

## 2020-09-04 DIAGNOSIS — Z00.00 ROUTINE GENERAL MEDICAL EXAMINATION AT A HEALTH CARE FACILITY: ICD-10-CM

## 2020-09-04 PROCEDURE — 87186 SC STD MICRODIL/AGAR DIL: CPT

## 2020-09-04 PROCEDURE — 99212 OFFICE O/P EST SF 10 MIN: CPT | Performed by: SURGERY

## 2020-09-04 PROCEDURE — 87070 CULTURE OTHR SPECIMN AEROBIC: CPT

## 2020-09-04 PROCEDURE — 87205 SMEAR GRAM STAIN: CPT

## 2020-09-04 PROCEDURE — 87077 CULTURE AEROBIC IDENTIFY: CPT

## 2020-09-04 PROCEDURE — 99211 OFF/OP EST MAY X REQ PHY/QHP: CPT

## 2020-09-04 NOTE — PLAN OF CARE
Problem: Wound:  Goal: Will show signs of wound healing; wound closure and no evidence of infection  Description: Will show signs of wound healing; wound closure and no evidence of infection   Outcome: Ongoing     Problem: Pressure Ulcer:  Goal: Signs of wound healing will improve  Description: Signs of wound healing will improve   Outcome: Ongoing

## 2020-09-04 NOTE — PROGRESS NOTES
Virtual Visit Via [employer]   Progress Note and Procedure Note    Brigida Adames  MEDICAL RECORD NUMBER:  [id number]  AGE: 79 y.o. GENDER: female  : 1940  EPISODE DATE:  2020    Subjective:     Chief Complaint   Patient presents with    Wound Check     Wound Check        Consent obtained to communicate via Virtual Visit:  Yes     HISTORY of PRESENT ILLNESS HPI     Brigida Adames is a 79 y.o. female who presents today via Virtual Visit wound/ulcer evaluation.    History of Wound Context: Pt with sacral wound and chronic stool incontinence being seen by virtual visit for eval/treat  Wound/Ulcer Pain Timing/Severity: none  Quality of pain: N/A  Severity:  0 / 10   Modifying Factors: None  Associated Signs/Symptoms: none    Ulcer Identification:  Ulcer Type: pressure    Contributing Factors: chronic pressure, decreased mobility, shear force and obesity    Acute Wound: N/A not an acute wound    PAST MEDICAL HISTORY        Diagnosis Date    CAD (coronary artery disease)     Depressive disorder     GERD (gastroesophageal reflux disease)     bleeding ulcer    Hemorrhage of gastrointestinal tract     Hyperlipidemia     Hypertension     Osteoarthrosis     Pacemaker     Retention of fluid     Sick sinus syndrome (HCC)     Sinus node dysfunction (HCC)        PAST SURGICAL HISTORY    Past Surgical History:   Procedure Laterality Date    APPENDECTOMY      BACK SURGERY      CHOLECYSTECTOMY      COLONOSCOPY      DILATATION, ESOPHAGUS      EYE SURGERY      HYSTERECTOMY      PACEMAKER PLACEMENT  2010    [employer]       FAMILY HISTORY    Family History   Problem Relation Age of Onset    Heart Disease Mother     Heart Disease Father        SOCIAL HISTORY    Social History     Tobacco Use    Smoking status: Former Smoker     Types: Cigarettes    Smokeless tobacco: Never Used   Substance Use Topics    Alcohol use: No    Drug use: No       ALLERGIES    Allergies Allergen Reactions    Morphine Sulfate [Morphine] Anaphylaxis     Pt states she quit breathing      Ciprocinonide [Fluocinolone]     Codeine Phosphate [Codeine] Nausea Only    Quinolones        MEDICATIONS    Current Outpatient Medications on File Prior to Encounter   Medication Sig Dispense Refill    atorvastatin (LIPITOR) 20 MG tablet Take 20 mg by mouth daily       melatonin 5 MG TABS tablet Take 10 mg by mouth nightly      bisacodyl (DULCOLAX) 5 MG EC tablet Take 5 mg by mouth daily as needed for Constipation      guaiFENesin (MUCINEX) 600 MG extended release tablet Take 1,200 mg by mouth 2 times daily      vitamin D 1000 units CAPS Take 1 capsule by mouth daily      traZODone (DESYREL) 50 MG tablet Take 50 mg by mouth daily      menthol-zinc oxide (CALMOSEPTINE) 0.44-20.625 % OINT ointment Apply topically daily Max 30 ml per day.  omeprazole (PRILOSEC) 20 MG delayed release capsule Take 20 mg by mouth daily      magnesium citrate solution Take 296 mLs by mouth once as needed for Constipation      tolnaftate (TINACTIN) 1 % cream Apply topically nightly       traMADol (ULTRAM ER) 300 MG extended releae tablet Take 300 mg by mouth daily.       rOPINIRole (REQUIP) 0.5 MG tablet Take 0.5 mg by mouth nightly       Multiple Vitamins-Minerals (THERAPEUTIC MULTIVITAMIN-MINERALS) tablet Take 1 tablet by mouth daily      furosemide (LASIX) 40 MG tablet Take 40 mg by mouth 2 times daily       citalopram (CELEXA) 40 MG tablet Take 40 mg by mouth daily       traMADol (ULTRAM) 50 MG tablet Take 50 mg by mouth nightly       ipratropium (ATROVENT) 0.03 % nasal spray 2 sprays by Nasal route 3 times daily as needed       bacitracin 500 UNIT/GM ophthalmic ointment Place into the left eye nightly       acetaminophen (TYLENOL) 325 MG tablet Take 325 mg by mouth every 12 hours as needed for Pain       loratadine (CLARITIN) 10 MG tablet Take 10 mg by mouth nightly       baclofen (LIORESAL) 10 MG tablet Take 10 mg by mouth 3 times daily as needed       docusate sodium (COLACE) 100 MG capsule Take 200 mg by mouth 2 times daily        No current facility-administered medications on file prior to encounter. REVIEW OF SYSTEMS    A comprehensive review of systems was negative.     Objective:     PHYSICAL EXAM    General Appearance: alert and oriented to person, place and time, well developed and well- nourished, in no acute distress  Skin: warm and dry, no rash or erythema  Head: normocephalic and atraumatic  Eyes: pupils equal, round, and reactive to light, extraocular eye movements intact, conjunctivae normal  ENT: tympanic membrane, external ear and ear canal normal bilaterally, nose without deformity, nasal mucosa and turbinates normal without polyps  Neck: supple and non-tender without mass, no thyromegaly or thyroid nodules, no cervical lymphadenopathy  Pulmonary/Chest: clear to auscultation bilaterally- no wheezes, rales or rhonchi, normal air movement, no respiratory distress  Cardiovascular: normal rate, regular rhythm, normal S1 and S2, no murmurs, rubs, clicks, or gallops, distal pulses intact, no carotid bruits  Abdomen: soft, non-tender, non-distended, normal bowel sounds, no masses or organomegaly  Extremities: no cyanosis, clubbing or edema  Musculoskeletal: normal range of motion, no joint swelling, deformity or tenderness    Assessment:      Problem List Items Addressed This Visit     * (Principal) Decubitus ulcer of sacral region, stage 4 (HCC) (Chronic)    Incontinence of feces (Chronic)    Malnutrition ([facility]          Performed by: Court Dang MD    Wound/Ulcer #: 1    Wound 10/12/17 Coccyx Wound 1, Pressure Stage 4, Coccyx (Active)   Wound Image   08/21/20 1142   Wound Pressure Stage  4 08/21/20 1142   Dressing Status Old drainage 08/14/20 1048   Dressing Changed Changed/New 03/10/20 1428   Dressing/Treatment Alginate with Ag;ABD 02/11/20 1448   Wound Cleansed Rinsed/Irrigated with Due to this being a TeleHealth encounter (During ASANF-12 public health emergency), evaluation of the following organ systems was limited: Vitals/Constitutional/EENT/Resp/CV/GI//MS/Neuro/Skin/Heme-Lymph-Imm. Pursuant to the emergency declaration under the [address] [place] [address] and the [employer] and [employer] Act, this Virtual Visit was conducted with patient's (and/or legal guardian's) consent, to reduce the patient's risk of exposure to COVID-19 and provide necessary medical care. The patient (and/or legal guardian) has also been advised to contact this office for worsening conditions or problems, and seek emergency medical treatment and/or call 911 if deemed necessary. Services were provided through a video synchronous discussion virtually to substitute for in-person clinic visit. Patient was located at their individual homes. Provider was located in [place] [address].     Electronically signed by Jhonny Gutiérrez MD on 9/4/2020 at 10:38 AM

## 2020-09-04 NOTE — PROGRESS NOTES
Virtual Visit Wound Care  Clinic Level of Care   NAME:  Pablo Pham  YOB: 1940 GENDER: female  MEDICAL RECORD NUMBER:  [id number]   DATE:  9/4/2020     Patient Type Points   No documentation completed by nursing staff. []   0   Nursing staff documented in the navigator for an ESTABLISHED patient including Episode, Patient ID, Chief Complaint, Travel Screen, Allergies, Latex Allergy, Home Medication, History, Psychosocial Screen, C-SSRS Screen, Fall Risk, Nutritional Screen, Advanced Directive, Education and Plan of Care, and Discharge Instructions. The Functional Screening tab is only required if the patient's status changes. [x]   1   Nursing staff documented in the navigator for a NEW patient including Patient ID, Chief Complaint, Travel Screen, Allergies, Latex Allergy, Home Medication, History, Psychosocial Screen, C-SSRS Screen, Fall Risk, Nutritional Screen, Advanced Directive, Functional Screen, Education and Plan of Care, and Discharge Instructions. []   2   Nursing staff documented in the navigator for a CONSULT patient including Episode, Patient ID, Chief Complaint, Travel Screen, Allergies, Latex Allergy, Home Medication, History, Psychosocial Screen, C-SSRS Screen, Fall Risk, Nutritional Screen, Advanced Directive, Functional Screen, Education and Plan of Care, and Discharge Instructions. []   2     Wound Description Points   Unable to obtain image of Wound. For example, patient/caregiver is instructed not to remove dressing, is unable to correctly position smart phone, no smart phone is available, patient is unable to maintain connectivity or the patient's wound is healed. []   0   1-3 wound images annotated. Images of the wound(s) is obtained and annotated along with completed description in [address] [place] [address]. [x]   1   4-5 wound images annotated. Images of the wound(s) is obtained and annotated along with completed description in [address] [place] [address]. []   2   Greater than 6 wound images annotated.  Images of the wound(s) is obtained and annotated along with completed description in [address] [place] [address]. []   3     Education Points   No Education completed by nursing staff.    []   0   Patient/caregiver is educated on 1-4 topics. Nursing staff identifies learner, confirms understanding of information (verbal, demonstration, written) and documents details. May include Discharge Instructions/AVS, available documents in My Chart, or Web-based learning. [x]   1   Patient/caregiver is educated on 5-9 topics. Nursing staff identifies learner, confirms understanding of information (verbal, demonstration, written) and documents details. May include Discharge Instructions/AVS, available documents in My Chart, or Web-based learning. []   2   Patient/caregiver is educated on 10 or more topics. Nursing staff identifies learner, confirms understanding of information (verbal, demonstration, written) and documents details. May include Discharge Instructions/AVS, available documents in My Chart, or Web-based learning. []   3     Follow-up Virtual Visit Points   No contact with outside resources made. [x]   0   Nursing staff contacts 1-2 outside resource. For example, telephone call made to home health, primary care provider, pharmacy, or DME. May include filling out forms and writing letters, arranging transportation, communication with insurance , vendors, etc.  Discharge, instructions and/or After Visit Summary given to patient/caregiver and instructions completed. []   1   Nursing staff contacts 3-4 outside resource. For example, telephone calls made to home health, primary care provider, pharmacy, or DME. May include filling out forms and writing letters, arranging transportation, communication with insurance , vendors, etc.  Discharge, instructions and/or After Visit Summary given to patient/caregiver and instructions completed. []   2   Nursing contacts 5 or more outside resource.  For example, telephone calls made to home health, primary care providers, pharmacy, or DME. May include filling out forms and writing letters, arranging transportation, communication with insurance , vendors, etc.  Discharge, instructions and/or After Visit Summary given to patient/caregiver and instructions completed.    []   3     Is this the Patient's First Visit to the [address] [place] [address]  No    Is this Patient Established @ [facility]  Yes             Virtual Visit Clinical Level of Care Wound Care      Points  1-3  Level 1 [x]     Points  4-5  Level 2 []     Points  6-7  Level 3 []     Points  8-9  Level 4 []     Points  10-11  Level 5 []       Electronically signed by Jerome Reis RN on 9/4/2020 at @NO

## 2020-09-07 LAB
BACTERIA SPEC AEROBE CULT: ABNORMAL
GRAM STN SPEC: ABNORMAL

## 2020-09-11 ENCOUNTER — HOSPITAL ENCOUNTER (OUTPATIENT)
Dept: WOUND CARE | Age: 80
Discharge: HOME | End: 2020-09-11
Payer: MEDICARE

## 2020-09-11 PROCEDURE — 99211 OFF/OP EST MAY X REQ PHY/QHP: CPT

## 2020-09-11 PROCEDURE — 99212 OFFICE O/P EST SF 10 MIN: CPT | Performed by: SURGERY

## 2020-09-11 NOTE — PROGRESS NOTES
Virtual Visit [facility] [employer]   Progress Note and Procedure Note    Pablo Pham  MEDICAL RECORD NUMBER:  [id number]  AGE: 79 y.o. GENDER: female  : 1940  EPISODE DATE:  2020    Subjective:     Chief Complaint   Patient presents with    Wound Check     Wound Check        Consent obtained to communicate via Virtual Visit:  Yes     HISTORY of PRESENT ILLNESS HPI     Pablo Pham is a 79 y.o. female who presents today via Virtual Visit wound/ulcer evaluation.    History of Wound Context: Pt with coccyx wound seeing me for virtual visit from NH for eval/treat  Wound/Ulcer Pain Timing/Severity: none  Quality of pain: N/A  Severity:  0 / 10   Modifying Factors: None  Associated Signs/Symptoms: none    Ulcer Identification:  Ulcer Type: pressure    Contributing Factors: chronic pressure, shear force and incontinence of stool    Acute Wound: N/A not an acute wound    PAST MEDICAL HISTORY        Diagnosis Date    CAD (coronary artery disease)     Depressive disorder     GERD (gastroesophageal reflux disease)     bleeding ulcer    Hemorrhage of gastrointestinal tract     Hyperlipidemia     Hypertension     Osteoarthrosis     Pacemaker     Retention of fluid     Sick sinus syndrome (HCC)     Sinus node dysfunction (HCC)        PAST SURGICAL HISTORY    Past Surgical History:   Procedure Laterality Date    APPENDECTOMY      BACK SURGERY      CHOLECYSTECTOMY      COLONOSCOPY      DILATATION, ESOPHAGUS      EYE SURGERY      HYSTERECTOMY      PACEMAKER PLACEMENT  2010    [employer]       FAMILY HISTORY    Family History   Problem Relation Age of Onset    Heart Disease Mother     Heart Disease Father        SOCIAL HISTORY    Social History     Tobacco Use    Smoking status: Former Smoker     Types: Cigarettes    Smokeless tobacco: Never Used   Substance Use Topics    Alcohol use: No    Drug use: No       ALLERGIES    Allergies   Allergen Reactions    Morphine Sulfate [Morphine] Anaphylaxis     Pt states she quit breathing      Ciprocinonide [Fluocinolone]     Codeine Phosphate [Codeine] Nausea Only    Quinolones        MEDICATIONS    Current Outpatient Medications on File Prior to Encounter   Medication Sig Dispense Refill    atorvastatin (LIPITOR) 20 MG tablet Take 20 mg by mouth daily       melatonin 5 MG TABS tablet Take 10 mg by mouth nightly      bisacodyl (DULCOLAX) 5 MG EC tablet Take 5 mg by mouth daily as needed for Constipation      guaiFENesin (MUCINEX) 600 MG extended release tablet Take 1,200 mg by mouth 2 times daily      vitamin D 1000 units CAPS Take 1 capsule by mouth daily      traZODone (DESYREL) 50 MG tablet Take 50 mg by mouth daily      menthol-zinc oxide (CALMOSEPTINE) 0.44-20.625 % OINT ointment Apply topically daily Max 30 ml per day.  omeprazole (PRILOSEC) 20 MG delayed release capsule Take 20 mg by mouth daily      magnesium citrate solution Take 296 mLs by mouth once as needed for Constipation      tolnaftate (TINACTIN) 1 % cream Apply topically nightly       traMADol (ULTRAM ER) 300 MG extended releae tablet Take 300 mg by mouth daily.       rOPINIRole (REQUIP) 0.5 MG tablet Take 0.5 mg by mouth nightly       Multiple Vitamins-Minerals (THERAPEUTIC MULTIVITAMIN-MINERALS) tablet Take 1 tablet by mouth daily      furosemide (LASIX) 40 MG tablet Take 40 mg by mouth 2 times daily       citalopram (CELEXA) 40 MG tablet Take 40 mg by mouth daily       traMADol (ULTRAM) 50 MG tablet Take 50 mg by mouth nightly       ipratropium (ATROVENT) 0.03 % nasal spray 2 sprays by Nasal route 3 times daily as needed       bacitracin 500 UNIT/GM ophthalmic ointment Place into the left eye nightly       acetaminophen (TYLENOL) 325 MG tablet Take 325 mg by mouth every 12 hours as needed for Pain       loratadine (CLARITIN) 10 MG tablet Take 10 mg by mouth nightly       baclofen (LIORESAL) 10 MG tablet Take 10 mg by mouth 3 times daily as needed       docusate sodium (COLACE) 100 MG capsule Take 200 mg by mouth 2 times daily        No current facility-administered medications on file prior to encounter. REVIEW OF SYSTEMS    A comprehensive review of systems was negative.     Objective:     PHYSICAL EXAM    General Appearance: alert and oriented to person, place and time, well developed and well- nourished, in no acute distress  Skin: warm and dry, no rash or erythema  Head: normocephalic and atraumatic  Eyes: pupils equal, round, and reactive to light, extraocular eye movements intact, conjunctivae normal  ENT: tympanic membrane, external ear and ear canal normal bilaterally, nose without deformity, nasal mucosa and turbinates normal without polyps  Neck: supple and non-tender without mass, no thyromegaly or thyroid nodules, no cervical lymphadenopathy  Pulmonary/Chest: clear to auscultation bilaterally- no wheezes, rales or rhonchi, normal air movement, no respiratory distress  Cardiovascular: normal rate, regular rhythm, normal S1 and S2, no murmurs, rubs, clicks, or gallops, distal pulses intact, no carotid bruits  Abdomen: soft, non-tender, non-distended, normal bowel sounds, no masses or organomegaly  Extremities: no cyanosis, clubbing or edema  Musculoskeletal: normal range of motion, no joint swelling, deformity or tenderness  Neurologic: reflexes normal and symmetric, no cranial nerve deficit, gait, coordination and speech normal    Assessment:      Problem List Items Addressed This Visit     * (Principal) Decubitus ulcer of sacral region, stage 4 (HCC) (Chronic)    Incontinence of feces (Chronic)    Malnutrition (HCC)          Performed by: Aaron Armas MD    Wound/Ulcer #: 1    Wound 10/12/17 Coccyx Wound 1, Pressure Stage 4, Coccyx (Active)   Wound Image   09/04/20 1044   Wound Pressure Stage  4 09/04/20 1044   Dressing Status Old drainage 09/04/20 1044   Dressing Changed Changed/New 03/10/20 0913   Dressing/Treatment Alginate with Ag;ABD 02/11/20 1448   Wound Cleansed Rinsed/Irrigated with saline 03/10/20 1333   Wound Length (cm) 0.9 cm 03/10/20 1333   Wound Width (cm) 0.7 cm 03/10/20 1333   Wound Depth (cm) 0.9 cm 03/10/20 1333   Wound Surface Area (cm^2) 0.63 cm^2 03/10/20 1333   Change in Wound Size % (l*w) 88.95 03/10/20 1333   Wound Volume (cm^3) 0.57 cm^3 03/10/20 1333   Wound Healing % 96 03/10/20 1333   Post-Procedure Length (cm) 0.9 cm 03/10/20 1347   Post-Procedure Width (cm) 0.7 cm 03/10/20 1347   Post-Procedure Depth (cm) 0.9 cm 03/10/20 1347   Post-Procedure Surface Area (cm^2) 0.63 cm^2 03/10/20 1347   Post-Procedure Volume (cm^3) 0.57 cm^3 03/10/20 1347   Distance Tunneling (cm) 0 cm 03/10/20 1333   Tunneling Position ___ O'Clock 0 03/10/20 1333   Undermining Starts ___ O'Clock 0 03/10/20 1333   Undermining Ends___ O'Clock 0 03/10/20 1333   Undermining Maxium Distance (cm) 0 03/10/20 1333   Wound Assessment Bleeding;Red;Slough; Yellow 03/10/20 1333   Drainage Amount Moderate 09/04/20 1044   Drainage Description Serous 09/04/20 1044   Odor None 03/10/20 1333   Margins Attached edges 03/10/20 1333   Exposed structure Muscle 10/29/19 1330   Sabine-wound Assessment Pink;Red 09/04/20 1044   Non-staged Wound Description Not applicable 39/02/84 2595   [place]%Wound Bed 0 03/10/20 1333   Red%Wound Bed 95 03/10/20 1333   Yellow%Wound Bed 5 03/10/20 1333   Black%Wound Bed 0 03/10/20 1333   Purple%Wound Bed 0 03/10/20 1333   Other%Wound Bed 0 03/10/20 1333   Number of days: 1064          Diabetic/Pressure/Non Pressure Ulcers only:  Ulcer: Pressure ulcer, Stage 4       Plan:     Please see attached Discharge Instructions    Based upon this virtual visit it is not required to have an in-person visit of this time.                 Written patient dismissal instructions available to Patient/POA       Discharge Instructions         [address] [facility]   Physician Orders and Discharge Instructions  [facility]  [address] [place] [place]  Telephone: [phone]    NAME:  George Benitez OF BIRTH:  1940  MEDICAL RECORD NUMBER:  [id number]  DATE:  2020    Discharge condition: Stable    Discharge to: Home    Left via:Private automobile    Accompanied by: Staff    ECF/HHA:     Dressing Orders:    Treatment Orders:    [address] followup visit _____________________________  (Please note your next appointment above and if you are unable to keep, kindly give a 24 hour notice. Thank you.)          If you experience any of the following, please call the [facility] during business hours:    * Increase in Pain  * Temperature over 101  * Increase in drainage from your wound  * Drainage with a foul odor  * Bleeding  * Increase in swelling  * Need for compression bandage changes due to slippage, breakthrough drainage. If you need medical attention outside of the business hours of the [facility] please contact your PCP or go to the nearest emergency roomJunior Covarrubias AGE: 79 y.o. GENDER: female  : 1940 being evaluated by a Virtual Visit (video visit) encounter to address concerns as mentioned above. A caregiver was present when appropriate. Due to this being a TeleHealth encounter (During  public health emergency), evaluation of the following organ systems was limited: Vitals/Constitutional/EENT/Resp/CV/GI//MS/Neuro/Skin/Heme-Lymph-Imm. Pursuant to the emergency declaration under the [facility] waiver authority and the [employer] and [facility] General Act, this Virtual Visit was conducted with patient's (and/or legal guardian's) consent, to reduce the patient's risk of exposure to COVID-19 and provide necessary medical care.   The patient (and/or legal guardian) has also been advised to contact this office for worsening conditions or problems, and seek emergency medical treatment and/or call 911 if deemed necessary. Services were provided through a video synchronous discussion virtually to substitute for in-person clinic visit. Patient was located at their individual homes. Provider was located in [place] [address].     Electronically signed by Mansoor Al MD on 9/11/2020 at 10:35 AM

## 2020-09-11 NOTE — PROGRESS NOTES
Virtual Visit Wound Care  Clinic Level of Care   NAME:  Nehemias Ross  YOB: 1940 GENDER: female  MEDICAL RECORD NUMBER:  [id number]   DATE:  9/11/2020     Patient Type Points   No documentation completed by nursing staff. []   0   Nursing staff documented in the navigator for an ESTABLISHED patient including Episode, Patient ID, Chief Complaint, Travel Screen, Allergies, Latex Allergy, Home Medication, History, Psychosocial Screen, C-SSRS Screen, Fall Risk, Nutritional Screen, Advanced Directive, Education and Plan of Care, and Discharge Instructions. The Functional Screening tab is only required if the patient's status changes. [x]   1   Nursing staff documented in the navigator for a NEW patient including Patient ID, Chief Complaint, Travel Screen, Allergies, Latex Allergy, Home Medication, History, Psychosocial Screen, C-SSRS Screen, Fall Risk, Nutritional Screen, Advanced Directive, Functional Screen, Education and Plan of Care, and Discharge Instructions. []   2   Nursing staff documented in the navigator for a CONSULT patient including Episode, Patient ID, Chief Complaint, Travel Screen, Allergies, Latex Allergy, Home Medication, History, Psychosocial Screen, C-SSRS Screen, Fall Risk, Nutritional Screen, Advanced Directive, Functional Screen, Education and Plan of Care, and Discharge Instructions. []   2     Wound Description Points   Unable to obtain image of Wound. For example, patient/caregiver is instructed not to remove dressing, is unable to correctly position smart phone, no smart phone is available, patient is unable to maintain connectivity or the patient's wound is healed. []   0   1-3 wound images annotated. Images of the wound(s) is obtained and annotated along with completed description in [address] [place] [address]. [x]   1   4-5 wound images annotated. Images of the wound(s) is obtained and annotated along with completed description in [address] [place] [address]. []   2   Greater than 6 wound images annotated.  Images of the wound(s) is obtained and annotated along with completed description in [address] [place] [address]. []   3     Education Points   No Education completed by nursing staff.    []   0   Patient/caregiver is educated on 1-4 topics. Nursing staff identifies learner, confirms understanding of information (verbal, demonstration, written) and documents details. May include Discharge Instructions/AVS, available documents in My Chart, or Web-based learning. [x]   1   Patient/caregiver is educated on 5-9 topics. Nursing staff identifies learner, confirms understanding of information (verbal, demonstration, written) and documents details. May include Discharge Instructions/AVS, available documents in My Chart, or Web-based learning. []   2   Patient/caregiver is educated on 10 or more topics. Nursing staff identifies learner, confirms understanding of information (verbal, demonstration, written) and documents details. May include Discharge Instructions/AVS, available documents in My Chart, or Web-based learning. []   3     Follow-up Virtual Visit Points   No contact with outside resources made. [x]   0   Nursing staff contacts 1-2 outside resource. For example, telephone call made to home health, primary care provider, pharmacy, or DME. May include filling out forms and writing letters, arranging transportation, communication with insurance , vendors, etc.  Discharge, instructions and/or After Visit Summary given to patient/caregiver and instructions completed. []   1   Nursing staff contacts 3-4 outside resource. For example, telephone calls made to home health, primary care provider, pharmacy, or DME. May include filling out forms and writing letters, arranging transportation, communication with insurance , vendors, etc.  Discharge, instructions and/or After Visit Summary given to patient/caregiver and instructions completed. []   2   Nursing contacts 5 or more outside resource.  For example, telephone calls made to home health, primary care providers, pharmacy, or DME. May include filling out forms and writing letters, arranging transportation, communication with insurance , vendors, etc.  Discharge, instructions and/or After Visit Summary given to patient/caregiver and instructions completed.    []   3     Is this the Patient's First Visit to the [address] [place] [address] Road  No    Is this Patient Established @ [facility]  Yes             Virtual Visit Clinical Level of Care Wound Care      Points  1-3  Level 1 [x]     Points  4-5  Level 2 []     Points  6-7  Level 3 []     Points  8-9  Level 4 []     Points  10-11  Level 5 []       Electronically signed by Kenji Muhammad RN on 9/11/2020 at @NO

## 2020-09-11 NOTE — PLAN OF CARE
Problem: Wound:  Goal: Will show signs of wound healing; wound closure and no evidence of infection  Description: Will show signs of wound healing; wound closure and no evidence of infection   Outcome: Ongoing     Problem: Pressure Ulcer:  Goal: Signs of wound healing will improve  Description: Signs of wound healing will improve   Outcome: Ongoing     Problem: Pressure Ulcer:  Goal: Absence of new pressure ulcer  Description: Absence of new pressure ulcer   Outcome: Ongoing     Problem: Pressure Ulcer:  Goal: Will show no infection signs and symptoms  Description: Will show no infection signs and symptoms   Outcome: Ongoing

## 2020-09-18 ENCOUNTER — HOSPITAL ENCOUNTER (OUTPATIENT)
Dept: WOUND CARE | Age: 80
Discharge: HOME | End: 2020-09-18
Payer: MEDICARE

## 2020-09-18 VITALS — BODY MASS INDEX: 28.64 KG/M2 | WEIGHT: 189 LBS | HEIGHT: 68 IN

## 2020-09-18 PROCEDURE — 99212 OFFICE O/P EST SF 10 MIN: CPT | Performed by: SURGERY

## 2020-09-18 PROCEDURE — 99211 OFF/OP EST MAY X REQ PHY/QHP: CPT

## 2020-09-18 ASSESSMENT — PAIN SCALES - GENERAL: PAINLEVEL_OUTOF10: 0

## 2020-09-18 NOTE — PROGRESS NOTES
Virtual Visit Wound Care  Clinic Level of Care   NAME:  Elke Salas  YOB: 1940 GENDER: female  MEDICAL RECORD NUMBER:  [id number]   DATE:  9/18/2020     Patient Type Points   No documentation completed by nursing staff. []   0   Nursing staff documented in the navigator for an ESTABLISHED patient including Episode, Patient ID, Chief Complaint, Travel Screen, Allergies, Latex Allergy, Home Medication, History, Psychosocial Screen, C-SSRS Screen, Fall Risk, Nutritional Screen, Advanced Directive, Education and Plan of Care, and Discharge Instructions. The Functional Screening tab is only required if the patient's status changes. [x]   1   Nursing staff documented in the navigator for a NEW patient including Patient ID, Chief Complaint, Travel Screen, Allergies, Latex Allergy, Home Medication, History, Psychosocial Screen, C-SSRS Screen, Fall Risk, Nutritional Screen, Advanced Directive, Functional Screen, Education and Plan of Care, and Discharge Instructions. []   2   Nursing staff documented in the navigator for a CONSULT patient including Episode, Patient ID, Chief Complaint, Travel Screen, Allergies, Latex Allergy, Home Medication, History, Psychosocial Screen, C-SSRS Screen, Fall Risk, Nutritional Screen, Advanced Directive, Functional Screen, Education and Plan of Care, and Discharge Instructions. []   2     Wound Description Points   Unable to obtain image of Wound. For example, patient/caregiver is instructed not to remove dressing, is unable to correctly position smart phone, no smart phone is available, patient is unable to maintain connectivity or the patient's wound is healed. []   0   1-3 wound images annotated. Images of the wound(s) is obtained and annotated along with completed description in [address] [place] [address]. [x]   1   4-5 wound images annotated. Images of the wound(s) is obtained and annotated along with completed description in [address] [place] [address]. []   2   Greater than 6 wound images annotated.  Images of the wound(s) is obtained and annotated along with completed description in [address] [place] [address]. []   3     Education Points   No Education completed by nursing staff.    []   0   Patient/caregiver is educated on 1-4 topics. Nursing staff identifies learner, confirms understanding of information (verbal, demonstration, written) and documents details. May include Discharge Instructions/AVS, available documents in My Chart, or Web-based learning. [x]   1   Patient/caregiver is educated on 5-9 topics. Nursing staff identifies learner, confirms understanding of information (verbal, demonstration, written) and documents details. May include Discharge Instructions/AVS, available documents in My Chart, or Web-based learning. []   2   Patient/caregiver is educated on 10 or more topics. Nursing staff identifies learner, confirms understanding of information (verbal, demonstration, written) and documents details. May include Discharge Instructions/AVS, available documents in My Chart, or Web-based learning. []   3     Follow-up Virtual Visit Points   No contact with outside resources made. [x]   0   Nursing staff contacts 1-2 outside resource. For example, telephone call made to home health, primary care provider, pharmacy, or DME. May include filling out forms and writing letters, arranging transportation, communication with insurance , vendors, etc.  Discharge, instructions and/or After Visit Summary given to patient/caregiver and instructions completed. []   1   Nursing staff contacts 3-4 outside resource. For example, telephone calls made to home health, primary care provider, pharmacy, or DME. May include filling out forms and writing letters, arranging transportation, communication with insurance , vendors, etc.  Discharge, instructions and/or After Visit Summary given to patient/caregiver and instructions completed. []   2   Nursing contacts 5 or more outside resource.  For example, telephone calls made to home health, primary care providers, pharmacy, or DME. May include filling out forms and writing letters, arranging transportation, communication with insurance , vendors, etc.  Discharge, instructions and/or After Visit Summary given to patient/caregiver and instructions completed.    []   3     Is this the Patient's First Visit to the [address] [place] [address] Road  No    Is this Patient Established @ [facility]  Yes             Virtual Visit Clinical Level of Care Wound Care      Points  1-3  Level 1 [x]     Points  4-5  Level 2 []     Points  6-7  Level 3 []     Points  8-9  Level 4 []     Points  10-11  Level 5 []       Electronically signed by Alethea Bush RN on 9/18/2020 at @NO

## 2020-09-18 NOTE — PROGRESS NOTES
Virtual Visit Via [employer]   Progress Note and Procedure Note    Anjum Campos  MEDICAL RECORD NUMBER:  [id number]  AGE: 79 y.o. GENDER: female  : 1940  EPISODE DATE:  2020    Subjective:     No chief complaint on file. Consent obtained to communicate via Virtual Visit:  Yes     HISTORY of PRESENT ILLNESS HPI     Anjum Campos is a 79 y.o. female who presents today via Virtual Visit wound/ulcer evaluation. History of Wound Context: Pt with stage 4 sacral wound here for eval/treat by virtual visit.   Wound/Ulcer Pain Timing/Severity: none  Quality of pain: N/A  Severity:  0 / 10   Modifying Factors: None  Associated Signs/Symptoms: none    Ulcer Identification:  Ulcer Type: pressure    Contributing Factors: chronic pressure, decreased mobility, shear force, incontinence of stool and incontinence of urine    Acute Wound: N/A not an acute wound    PAST MEDICAL HISTORY        Diagnosis Date    CAD (coronary artery disease)     Depressive disorder     GERD (gastroesophageal reflux disease)     bleeding ulcer    Hemorrhage of gastrointestinal tract     Hyperlipidemia     Hypertension     Osteoarthrosis     Pacemaker     Retention of fluid     Sick sinus syndrome (HCC)     Sinus node dysfunction (HCC)        PAST SURGICAL HISTORY    Past Surgical History:   Procedure Laterality Date    APPENDECTOMY      BACK SURGERY      CHOLECYSTECTOMY      COLONOSCOPY      DILATATION, ESOPHAGUS      EYE SURGERY      HYSTERECTOMY      PACEMAKER PLACEMENT  2010    [employer]       FAMILY HISTORY    Family History   Problem Relation Age of Onset    Heart Disease Mother     Heart Disease Father        SOCIAL HISTORY    Social History     Tobacco Use    Smoking status: Former Smoker     Types: Cigarettes    Smokeless tobacco: Never Used   Substance Use Topics    Alcohol use: No    Drug use: No       ALLERGIES    Allergies   Allergen Reactions    Morphine Sulfate [Morphine] Anaphylaxis     Pt states she quit breathing      Ciprocinonide [Fluocinolone]     Codeine Phosphate [Codeine] Nausea Only    Quinolones        MEDICATIONS    Current Outpatient Medications on File Prior to Encounter   Medication Sig Dispense Refill    atorvastatin (LIPITOR) 20 MG tablet Take 20 mg by mouth daily       melatonin 5 MG TABS tablet Take 10 mg by mouth nightly      bisacodyl (DULCOLAX) 5 MG EC tablet Take 5 mg by mouth daily as needed for Constipation      guaiFENesin (MUCINEX) 600 MG extended release tablet Take 1,200 mg by mouth 2 times daily      vitamin D 1000 units CAPS Take 1 capsule by mouth daily      traZODone (DESYREL) 50 MG tablet Take 50 mg by mouth daily      menthol-zinc oxide (CALMOSEPTINE) 0.44-20.625 % OINT ointment Apply topically daily Max 30 ml per day.  omeprazole (PRILOSEC) 20 MG delayed release capsule Take 20 mg by mouth daily      magnesium citrate solution Take 296 mLs by mouth once as needed for Constipation      tolnaftate (TINACTIN) 1 % cream Apply topically nightly       traMADol (ULTRAM ER) 300 MG extended releae tablet Take 300 mg by mouth daily.       rOPINIRole (REQUIP) 0.5 MG tablet Take 0.5 mg by mouth nightly       Multiple Vitamins-Minerals (THERAPEUTIC MULTIVITAMIN-MINERALS) tablet Take 1 tablet by mouth daily      furosemide (LASIX) 40 MG tablet Take 40 mg by mouth 2 times daily       citalopram (CELEXA) 40 MG tablet Take 40 mg by mouth daily       traMADol (ULTRAM) 50 MG tablet Take 50 mg by mouth nightly       ipratropium (ATROVENT) 0.03 % nasal spray 2 sprays by Nasal route 3 times daily as needed       bacitracin 500 UNIT/GM ophthalmic ointment Place into the left eye nightly       acetaminophen (TYLENOL) 325 MG tablet Take 325 mg by mouth every 12 hours as needed for Pain       loratadine (CLARITIN) 10 MG tablet Take 10 mg by mouth nightly       baclofen (LIORESAL) 10 MG tablet Take 10 mg by mouth 3 times daily as needed       docusate sodium (COLACE) 100 MG capsule Take 200 mg by mouth 2 times daily        No current facility-administered medications on file prior to encounter. REVIEW OF SYSTEMS    A comprehensive review of systems was negative.     Objective:     PHYSICAL EXAM    General Appearance: alert and oriented to person, place and time, well developed and well- nourished, in no acute distress  Skin: warm and dry, no rash or erythema  Head: normocephalic and atraumatic  Eyes: pupils equal, round, and reactive to light, extraocular eye movements intact, conjunctivae normal  ENT: tympanic membrane, external ear and ear canal normal bilaterally, nose without deformity, nasal mucosa and turbinates normal without polyps  Neck: supple and non-tender without mass, no thyromegaly or thyroid nodules, no cervical lymphadenopathy  Pulmonary/Chest: clear to auscultation bilaterally- no wheezes, rales or rhonchi, normal air movement, no respiratory distress  Cardiovascular: normal rate, regular rhythm, normal S1 and S2, no murmurs, rubs, clicks, or gallops, distal pulses intact, no carotid bruits  Abdomen: soft, non-tender, non-distended, normal bowel sounds, no masses or organomegaly  Extremities: no cyanosis, clubbing or edema  Musculoskeletal: normal range of motion, no joint swelling, deformity or tenderness    Assessment:      Problem List Items Addressed This Visit     * (Principal) Decubitus ulcer of sacral region, stage 4 (HCC) (Chronic)    Debilitated - Primary (Chronic)    Incontinence of feces (Chronic)    [facility] ([facility]          Performed by: Sofía Trujillo MD    Wound/Ulcer #: 1    Wound 10/12/17 Coccyx Wound 1, Pressure Stage 4, Coccyx (Active)   Wound Image   09/11/20 1039   Wound Pressure Stage  4 09/18/20 0907   Offloading for Diabetic Foot Ulcers Yes (type) 09/11/20 1039   Dressing Status Old drainage 09/18/20 0907   Dressing Changed Changed/New 03/10/20 1428   Dressing/Treatment Alginate with Ag;ABD 02/11/20 1448   Wound Cleansed Rinsed/Irrigated with saline 03/10/20 1333   Wound Length (cm) 0.6 cm 09/18/20 0907   Wound Width (cm) 0.6 cm 09/18/20 0907   Wound Depth (cm) 0.5 cm 09/18/20 0907   Wound Surface Area (cm^2) 0.36 cm^2 09/18/20 0907   Change in Wound Size % (l*w) 93.68 09/18/20 0907   Wound Volume (cm^3) 0.18 cm^3 09/18/20 0907   Wound Healing % 99 09/18/20 0907   Post-Procedure Length (cm) 0.9 cm 03/10/20 1347   Post-Procedure Width (cm) 0.7 cm 03/10/20 1347   Post-Procedure Depth (cm) 0.9 cm 03/10/20 1347   Post-Procedure Surface Area (cm^2) 0.63 cm^2 03/10/20 1347   Post-Procedure Volume (cm^3) 0.57 cm^3 03/10/20 1347   Distance Tunneling (cm) 0 cm 03/10/20 1333   Tunneling Position ___ O'Clock 0 03/10/20 1333   Undermining Starts ___ O'Clock 0 03/10/20 1333   Undermining Ends___ O'Clock 0 03/10/20 1333   Undermining Maxium Distance (cm) 0 03/10/20 1333   Wound Assessment Bleeding;Red;Slough; Yellow 03/10/20 1333   Drainage Amount Small 09/18/20 0907   Drainage Description Serous 09/18/20 0907   Odor None 09/18/20 0907   Margins Attached edges 09/18/20 0907   Exposed structure Muscle 10/29/19 1330   Sabine-wound Assessment Pink 09/11/20 1039   Non-staged Wound Description Not applicable 53/75/17 3884   Howells%Wound Bed 0 03/10/20 1333   Red%Wound Bed 95 03/10/20 1333   Yellow%Wound Bed 5 03/10/20 1333   Black%Wound Bed 0 03/10/20 1333   Purple%Wound Bed 0 03/10/20 1333   Other%Wound Bed 0 03/10/20 1333   Number of days: 1071          Diabetic/Pressure/Non Pressure Ulcers only:  Ulcer: Pressure ulcer, Stage 4       Plan:     Please see attached Discharge Instructions    Based upon this virtual visit it is not required to have an in-person visit of this time.             Written patient dismissal instructions available to Patient/POA       Discharge Instructions         [address] and [facility]   Physician Orders and Discharge Instructions  [address] [place] [address] systems was limited: Vitals/Constitutional/EENT/Resp/CV/GI//MS/Neuro/Skin/Heme-Lymph-Imm. Pursuant to the emergency declaration under the [address] [place] [address] authority and the [employer] and [employer] Act, this Virtual Visit was conducted with patient's (and/or legal guardian's) consent, to reduce the patient's risk of exposure to COVID-19 and provide necessary medical care. The patient (and/or legal guardian) has also been advised to contact this office for worsening conditions or problems, and seek emergency medical treatment and/or call 911 if deemed necessary. Services were provided through a video synchronous discussion virtually to substitute for in-person clinic visit. Patient was located at their individual homes. Provider was located in [place] [address].     Electronically signed by Nola Davis MD on 9/18/2020 at 9:15 AM

## 2020-09-24 ENCOUNTER — HOSPITAL ENCOUNTER (OUTPATIENT)
Dept: WOUND CARE | Age: 80
Discharge: HOME | End: 2020-09-24
Payer: MEDICARE

## 2020-09-24 VITALS — WEIGHT: 189 LBS | HEIGHT: 68 IN | BODY MASS INDEX: 28.64 KG/M2

## 2020-09-24 PROCEDURE — 99211 OFF/OP EST MAY X REQ PHY/QHP: CPT

## 2020-09-24 PROCEDURE — 99212 OFFICE O/P EST SF 10 MIN: CPT | Performed by: SURGERY

## 2020-09-24 ASSESSMENT — PAIN SCALES - GENERAL: PAINLEVEL_OUTOF10: 0

## 2020-09-24 NOTE — PROGRESS NOTES
Virtual Visit Via [employer]   Progress Note and Procedure Note    Karena Novak  MEDICAL RECORD NUMBER:  [id number]  AGE: 79 y.o. GENDER: female  : 1940  EPISODE DATE:  2020    Subjective:     No chief complaint on file. Consent obtained to communicate via Virtual Visit:  Yes     HISTORY of PRESENT ILLNESS HPI     Karena Novak is a 79 y.o. female who presents today via Virtual Visit wound/ulcer evaluation.    History of Wound Context: Pt with coccyx wound being seen by virtual visit for eval/treat  Wound/Ulcer Pain Timing/Severity: none  Quality of pain: N/A  Severity:  0 / 10   Modifying Factors: None  Associated Signs/Symptoms: none    Ulcer Identification:  Ulcer Type: pressure    Contributing Factors: chronic pressure, incontinence of stool and incontinence of urine    Acute Wound: N/A not an acute wound    PAST MEDICAL HISTORY        Diagnosis Date    CAD (coronary artery disease)     Depressive disorder     GERD (gastroesophageal reflux disease)     bleeding ulcer    Hemorrhage of gastrointestinal tract     Hyperlipidemia     Hypertension     Osteoarthrosis     Pacemaker     Retention of fluid     Sick sinus syndrome (HCC)     Sinus node dysfunction (HCC)        PAST SURGICAL HISTORY    Past Surgical History:   Procedure Laterality Date    APPENDECTOMY      BACK SURGERY      CHOLECYSTECTOMY      COLONOSCOPY      DILATATION, ESOPHAGUS      EYE SURGERY      HYSTERECTOMY      PACEMAKER PLACEMENT  2010    [employer]       FAMILY HISTORY    Family History   Problem Relation Age of Onset    Heart Disease Mother     Heart Disease Father        SOCIAL HISTORY    Social History     Tobacco Use    Smoking status: Former Smoker     Types: Cigarettes    Smokeless tobacco: Never Used   Substance Use Topics    Alcohol use: No    Drug use: No       ALLERGIES    Allergies   Allergen Reactions    Morphine Sulfate [Morphine] Anaphylaxis     Pt states she quit breathing      Ciprocinonide [Fluocinolone]     Codeine Phosphate [Codeine] Nausea Only    Quinolones        MEDICATIONS    Current Outpatient Medications on File Prior to Encounter   Medication Sig Dispense Refill    atorvastatin (LIPITOR) 20 MG tablet Take 20 mg by mouth daily       melatonin 5 MG TABS tablet Take 10 mg by mouth nightly      bisacodyl (DULCOLAX) 5 MG EC tablet Take 5 mg by mouth daily as needed for Constipation      guaiFENesin (MUCINEX) 600 MG extended release tablet Take 1,200 mg by mouth 2 times daily      vitamin D 1000 units CAPS Take 1 capsule by mouth daily      traZODone (DESYREL) 50 MG tablet Take 50 mg by mouth daily      menthol-zinc oxide (CALMOSEPTINE) 0.44-20.625 % OINT ointment Apply topically daily Max 30 ml per day.  omeprazole (PRILOSEC) 20 MG delayed release capsule Take 20 mg by mouth daily      magnesium citrate solution Take 296 mLs by mouth once as needed for Constipation      tolnaftate (TINACTIN) 1 % cream Apply topically nightly       traMADol (ULTRAM ER) 300 MG extended releae tablet Take 300 mg by mouth daily.       rOPINIRole (REQUIP) 0.5 MG tablet Take 0.5 mg by mouth nightly       Multiple Vitamins-Minerals (THERAPEUTIC MULTIVITAMIN-MINERALS) tablet Take 1 tablet by mouth daily      furosemide (LASIX) 40 MG tablet Take 40 mg by mouth 2 times daily       citalopram (CELEXA) 40 MG tablet Take 40 mg by mouth daily       traMADol (ULTRAM) 50 MG tablet Take 50 mg by mouth nightly       ipratropium (ATROVENT) 0.03 % nasal spray 2 sprays by Nasal route 3 times daily as needed       bacitracin 500 UNIT/GM ophthalmic ointment Place into the left eye nightly       acetaminophen (TYLENOL) 325 MG tablet Take 325 mg by mouth every 12 hours as needed for Pain       loratadine (CLARITIN) 10 MG tablet Take 10 mg by mouth nightly       baclofen (LIORESAL) 10 MG tablet Take 10 mg by mouth 3 times daily as needed       docusate sodium (COLACE) 100 MG capsule Take 200 mg by mouth 2 times daily        No current facility-administered medications on file prior to encounter. REVIEW OF SYSTEMS    A comprehensive review of systems was negative.     Objective:     PHYSICAL EXAM    General Appearance: alert and oriented to person, place and time, well developed and well- nourished, in no acute distress  Skin: warm and dry, no rash or erythema  Head: normocephalic and atraumatic  Eyes: pupils equal, round, and reactive to light, extraocular eye movements intact, conjunctivae normal  ENT: tympanic membrane, external ear and ear canal normal bilaterally, nose without deformity, nasal mucosa and turbinates normal without polyps  Neck: supple and non-tender without mass, no thyromegaly or thyroid nodules, no cervical lymphadenopathy  Pulmonary/Chest: clear to auscultation bilaterally- no wheezes, rales or rhonchi, normal air movement, no respiratory distress  Cardiovascular: normal rate, regular rhythm, normal S1 and S2, no murmurs, rubs, clicks, or gallops, distal pulses intact, no carotid bruits  Abdomen: soft, non-tender, non-distended, normal bowel sounds, no masses or organomegaly  Extremities: no cyanosis, clubbing or edema  Musculoskeletal: normal range of motion, no joint swelling, deformity or tenderness  Neurologic: reflexes normal and symmetric, no cranial nerve deficit, gait, coordination and speech normal    Assessment:      Problem List Items Addressed This Visit     * (Principal) Decubitus ulcer of sacral region, stage 4 (HCC) (Chronic)    Debilitated - Primary (Chronic)    Incontinence of feces (Chronic)    Malnutrition ([facility]          Performed by: Uma Wood MD    Wound/Ulcer #: 1    Wound 10/12/17 Coccyx Wound 1, Pressure Stage 4, Coccyx (Active)   Wound Image   09/18/20 0907   Wound Pressure Stage  4 09/18/20 0907   Offloading for Diabetic Foot Ulcers Yes (type) 09/18/20 0907   Dressing Status Old drainage 09/18/20 9228 Oxygen Therapy   Physician Orders and Discharge Instructions  [address] [place] [address] Natalee   Telephone: [phone]    NAME:  Shawna Rosales OF BIRTH:  1940  MEDICAL RECORD NUMBER:  [id number]  DATE:  2020    Discharge condition: Stable    Discharge to: Home    Left via:Private automobile    Accompanied by: Nurse    [facility]: [facility]    Dressing Orders: Coccyx:   Wash with soap and water. Saline moist Aquacel Ag to open area, Cover with dry gauze Hold in place with under garments  Apply Barrier Cream to Sabine Wound as needed    Treatment Orders:   Avoid Pressure to wound site. Turn frequently (turn at least every two hours when in bed)  Off-load heels by applying heel-lift boots or \"float\" heels by placing pillows under calves so that heels do not touch mattress. Continue Protein rich diet (unless restricted by your physician). Please continue Protein supplements. Take Multivitamin daily  Please use low air loss bed  Please use ROHO Cushion in wheelchair    [facility] followup visit _____________2 weeks________________  (Please note your next appointment above and if you are unable to keep, kindly give a 24 hour notice. Thank you.)    If you experience any of the following, please call the [employer] during business hours:    * Increase in Pain  * Temperature over 101  * Increase in drainage from your wound  * Drainage with a foul odor  * Bleeding  * Increase in swelling  * Need for compression bandage changes due to slippage, breakthrough drainage. If you need medical attention outside of the business hours of the [address] [place] [address] [address] please contact your PCP or go to the nearest emergency room. Rajani Lima AGE: 79 y.o. GENDER: female  : 1940 being evaluated by a Virtual Visit (video visit) encounter to address concerns as mentioned above. A caregiver was present when appropriate.  Due to this being a TeleHealth encounter (During VLKJQ-09 public health emergency), evaluation of the following organ systems was limited: Vitals/Constitutional/EENT/Resp/CV/GI//MS/Neuro/Skin/Heme-Lymph-Imm. Pursuant to the emergency declaration under the [address] [place] [address], [address] [place] [address] and the [employer] and [employer] Act, this Virtual Visit was conducted with patient's (and/or legal guardian's) consent, to reduce the patient's risk of exposure to COVID-19 and provide necessary medical care. The patient (and/or legal guardian) has also been advised to contact this office for worsening conditions or problems, and seek emergency medical treatment and/or call 911 if deemed necessary. Services were provided through a video synchronous discussion virtually to substitute for in-person clinic visit. Patient was located at their individual homes. Provider was located in [place] [address].     Electronically signed by Arpan Ma MD on 9/24/2020 at 1:56 PM

## 2020-09-24 NOTE — PROGRESS NOTES
Virtual Visit Wound Care  Clinic Level of Care   NAME:  Flor Granger  YOB: 1940 GENDER: female  MEDICAL RECORD NUMBER:  [id number]   DATE:  9/24/2020     Patient Type Points   No documentation completed by nursing staff. []   0   Nursing staff documented in the navigator for an ESTABLISHED patient including Episode, Patient ID, Chief Complaint, Travel Screen, Allergies, Latex Allergy, Home Medication, History, Psychosocial Screen, C-SSRS Screen, Fall Risk, Nutritional Screen, Advanced Directive, Education and Plan of Care, and Discharge Instructions. The Functional Screening tab is only required if the patient's status changes. [x]   1   Nursing staff documented in the navigator for a NEW patient including Patient ID, Chief Complaint, Travel Screen, Allergies, Latex Allergy, Home Medication, History, Psychosocial Screen, C-SSRS Screen, Fall Risk, Nutritional Screen, Advanced Directive, Functional Screen, Education and Plan of Care, and Discharge Instructions. []   2   Nursing staff documented in the navigator for a CONSULT patient including Episode, Patient ID, Chief Complaint, Travel Screen, Allergies, Latex Allergy, Home Medication, History, Psychosocial Screen, C-SSRS Screen, Fall Risk, Nutritional Screen, Advanced Directive, Functional Screen, Education and Plan of Care, and Discharge Instructions. []   2     Wound Description Points   Unable to obtain image of Wound. For example, patient/caregiver is instructed not to remove dressing, is unable to correctly position smart phone, no smart phone is available, patient is unable to maintain connectivity or the patient's wound is healed. []   0   1-3 wound images annotated. Images of the wound(s) is obtained and annotated along with completed description in [address] [place] [address]. [x]   1   4-5 wound images annotated. Images of the wound(s) is obtained and annotated along with completed description in [address] [place] [address]. []   2   Greater than 6 wound images annotated.  Images of the wound(s) is obtained and annotated along with completed description in [address] [place] [address]. []   3     Education Points   No Education completed by nursing staff.    []   0   Patient/caregiver is educated on 1-4 topics. Nursing staff identifies learner, confirms understanding of information (verbal, demonstration, written) and documents details. May include Discharge Instructions/AVS, available documents in My Chart, or Web-based learning. [x]   1   Patient/caregiver is educated on 5-9 topics. Nursing staff identifies learner, confirms understanding of information (verbal, demonstration, written) and documents details. May include Discharge Instructions/AVS, available documents in My Chart, or Web-based learning. []   2   Patient/caregiver is educated on 10 or more topics. Nursing staff identifies learner, confirms understanding of information (verbal, demonstration, written) and documents details. May include Discharge Instructions/AVS, available documents in My Chart, or Web-based learning. []   3     Follow-up Virtual Visit Points   No contact with outside resources made. []   0   Nursing staff contacts 1-2 outside resource. For example, telephone call made to home health, primary care provider, pharmacy, or DME. May include filling out forms and writing letters, arranging transportation, communication with insurance , vendors, etc.  Discharge, instructions and/or After Visit Summary given to patient/caregiver and instructions completed. []   1   Nursing staff contacts 3-4 outside resource. For example, telephone calls made to home health, primary care provider, pharmacy, or DME. May include filling out forms and writing letters, arranging transportation, communication with insurance , vendors, etc.  Discharge, instructions and/or After Visit Summary given to patient/caregiver and instructions completed. []   2   Nursing contacts 5 or more outside resource.  For example, telephone calls

## 2020-09-28 NOTE — PROGRESS NOTES
Spoke with Job Weiner from the [address], she said the Aquacel was making the wound worse, she requested to go back to the Bactroban, Dr Sabine Perez agreed

## 2020-10-08 ENCOUNTER — HOSPITAL ENCOUNTER (OUTPATIENT)
Dept: WOUND CARE | Age: 80
Discharge: HOME | End: 2020-10-08
Payer: MEDICARE

## 2020-10-08 VITALS — WEIGHT: 189 LBS | HEIGHT: 68 IN | BODY MASS INDEX: 28.64 KG/M2

## 2020-10-08 PROCEDURE — 99212 OFFICE O/P EST SF 10 MIN: CPT | Performed by: SURGERY

## 2020-10-08 PROCEDURE — 99211 OFF/OP EST MAY X REQ PHY/QHP: CPT

## 2020-10-08 ASSESSMENT — PAIN SCALES - GENERAL: PAINLEVEL_OUTOF10: 0

## 2020-10-08 NOTE — PROGRESS NOTES
made to home health, primary care providers, pharmacy, or DME. May include filling out forms and writing letters, arranging transportation, communication with insurance , vendors, etc.  Discharge, instructions and/or After Visit Summary given to patient/caregiver and instructions completed.    []   3     Is this the Patient's First Visit to the [address] [place] [address] Road  no    Is this Patient Established @ [facility]  yes             Virtual Visit Clinical Level of Care Wound Care      Points  1-3  Level 1 [x]     Points  4-5  Level 2 []     Points  6-7  Level 3 []     Points  8-9  Level 4 []     Points  10-11  Level 5 []       Electronically signed by Taryn Trinh RN on 10/8/2020 at @NO

## 2020-10-08 NOTE — PROGRESS NOTES
Virtual Visit [facility]   Progress Note and Procedure Note    Patricia Martinez  MEDICAL RECORD NUMBER:  [id number]  AGE: 79 y.o. GENDER: female  : 1940  EPISODE DATE:  10/8/2020    Subjective:     Chief Complaint   Patient presents with    Wound Check     Wound Check        Consent obtained to communicate via Virtual Visit:  Yes     HISTORY of PRESENT ILLNESS HPI     Patricia Martinez is a 79 y.o. female who presents today via Virtual Visit wound/ulcer evaluation.    History of Wound Context: Pt with coccyx wound being seen by virtual visit for eval/treat  Wound/Ulcer Pain Timing/Severity: none  Quality of pain: N/A  Severity:  0 / 10   Modifying Factors: None  Associated Signs/Symptoms: none    Ulcer Identification:  Ulcer Type: pressure    Contributing Factors: chronic pressure, decreased mobility, incontinence of stool and incontinence of urine    Acute Wound: N/A not an acute wound    PAST MEDICAL HISTORY        Diagnosis Date    CAD (coronary artery disease)     Depressive disorder     GERD (gastroesophageal reflux disease)     bleeding ulcer    Hemorrhage of gastrointestinal tract     Hyperlipidemia     Hypertension     Osteoarthrosis     Pacemaker     Retention of fluid     Sick sinus syndrome (HCC)     Sinus node dysfunction (HCC)        PAST SURGICAL HISTORY    Past Surgical History:   Procedure Laterality Date    APPENDECTOMY      BACK SURGERY      CHOLECYSTECTOMY      COLONOSCOPY      DILATATION, ESOPHAGUS      EYE SURGERY      HYSTERECTOMY      PACEMAKER PLACEMENT  2010    [employer]       FAMILY HISTORY    Family History   Problem Relation Age of Onset    Heart Disease Mother     Heart Disease Father        SOCIAL HISTORY    Social History     Tobacco Use    Smoking status: Former Smoker     Types: Cigarettes    Smokeless tobacco: Never Used   Substance Use Topics    Alcohol use: No    Drug use: No       ALLERGIES    Allergies Allergen Reactions    Morphine Sulfate [Morphine] Anaphylaxis     Pt states she quit breathing      Ciprocinonide [Fluocinolone]     Codeine Phosphate [Codeine] Nausea Only    Quinolones        MEDICATIONS    Current Outpatient Medications on File Prior to Encounter   Medication Sig Dispense Refill    atorvastatin (LIPITOR) 20 MG tablet Take 20 mg by mouth daily       melatonin 5 MG TABS tablet Take 10 mg by mouth nightly      bisacodyl (DULCOLAX) 5 MG EC tablet Take 5 mg by mouth daily as needed for Constipation      guaiFENesin (MUCINEX) 600 MG extended release tablet Take 1,200 mg by mouth 2 times daily      vitamin D 1000 units CAPS Take 1 capsule by mouth daily      traZODone (DESYREL) 50 MG tablet Take 50 mg by mouth daily      menthol-zinc oxide (CALMOSEPTINE) 0.44-20.625 % OINT ointment Apply topically daily Max 30 ml per day.  omeprazole (PRILOSEC) 20 MG delayed release capsule Take 20 mg by mouth daily      magnesium citrate solution Take 296 mLs by mouth once as needed for Constipation      tolnaftate (TINACTIN) 1 % cream Apply topically nightly       traMADol (ULTRAM ER) 300 MG extended releae tablet Take 300 mg by mouth daily.       rOPINIRole (REQUIP) 0.5 MG tablet Take 0.5 mg by mouth nightly       Multiple Vitamins-Minerals (THERAPEUTIC MULTIVITAMIN-MINERALS) tablet Take 1 tablet by mouth daily      furosemide (LASIX) 40 MG tablet Take 40 mg by mouth 2 times daily       citalopram (CELEXA) 40 MG tablet Take 40 mg by mouth daily       traMADol (ULTRAM) 50 MG tablet Take 50 mg by mouth nightly       ipratropium (ATROVENT) 0.03 % nasal spray 2 sprays by Nasal route 3 times daily as needed       bacitracin 500 UNIT/GM ophthalmic ointment Place into the left eye nightly       acetaminophen (TYLENOL) 325 MG tablet Take 325 mg by mouth every 12 hours as needed for Pain       loratadine (CLARITIN) 10 MG tablet Take 10 mg by mouth nightly       baclofen (LIORESAL) 10 MG tablet Take 10 mg by mouth 3 times daily as needed       docusate sodium (COLACE) 100 MG capsule Take 200 mg by mouth 2 times daily        No current facility-administered medications on file prior to encounter. REVIEW OF SYSTEMS    A comprehensive review of systems was negative.     Objective:     PHYSICAL EXAM    General Appearance: alert and oriented to person, place and time, well developed and well- nourished, in no acute distress  Skin: warm and dry, no rash or erythema  Head: normocephalic and atraumatic  Eyes: pupils equal, round, and reactive to light, extraocular eye movements intact, conjunctivae normal  ENT: tympanic membrane, external ear and ear canal normal bilaterally, nose without deformity, nasal mucosa and turbinates normal without polyps  Neck: supple and non-tender without mass, no thyromegaly or thyroid nodules, no cervical lymphadenopathy  Pulmonary/Chest: clear to auscultation bilaterally- no wheezes, rales or rhonchi, normal air movement, no respiratory distress  Cardiovascular: normal rate, regular rhythm, normal S1 and S2, no murmurs, rubs, clicks, or gallops, distal pulses intact, no carotid bruits  Abdomen: soft, non-tender, non-distended, normal bowel sounds, no masses or organomegaly  Extremities: no cyanosis, clubbing or edema  Musculoskeletal: normal range of motion, no joint swelling, deformity or tenderness  Neurologic: reflexes normal and symmetric, no cranial nerve deficit, gait, coordination and speech normal    Assessment:      Problem List Items Addressed This Visit     * (Principal) Decubitus ulcer of sacral region, stage 4 (HCC) (Chronic)    Debilitated - Primary (Chronic)    Incontinence of feces (Chronic)    Malnutrition ([facility]          Performed by: Qi Walden MD    Wound/Ulcer #: 1    Wound 10/12/17 Coccyx Wound 1, Pressure Stage 4, Coccyx (Active)   Wound Image   09/24/20 1357   Wound Etiology Pressure Stage  4 10/08/20 1331   Wound Cleansed Rinsed/Irrigated with Bactroban Ointment to open area, Cover with dry gauze Hold in place with under garments  Apply Barrier Cream to Sabine Wound as needed    Treatment Orders:   Avoid Pressure to wound site. Turn frequently (turn at least every two hours when in bed)  Off-load heels by applying heel-lift boots or \"float\" heels by placing pillows under calves so that heels do not touch mattress. Continue Protein rich diet (unless restricted by your physician). Please continue Protein supplements. Take Multivitamin daily  Please use low air loss bed  Please use ROHO Cushion in wheelchair    [facility] followup visit _____________________________  (Please note your next appointment above and if you are unable to keep, kindly give a 24 hour notice. Thank you.)    If you experience any of the following, please call the [address] [place] [address] [facility] during business hours:    * Increase in Pain  * Temperature over 101  * Increase in drainage from your wound  * Drainage with a foul odor  * Bleeding  * Increase in swelling  * Need for compression bandage changes due to slippage, breakthrough drainage. If you need medical attention outside of the business hours of the [address] [place] [address] [address] [address] please contact your PCP or go to the nearest emergency room. Travis Martin AGE: 79 y.o. GENDER: female  : 1940 being evaluated by a Virtual Visit (video visit) encounter to address concerns as mentioned above. A caregiver was present when appropriate. Due to this being a TeleHealth encounter (During [place] [address] public health emergency), evaluation of the following organ systems was limited: Vitals/Constitutional/EENT/Resp/CV/GI//MS/Neuro/Skin/Heme-Lymph-Imm.   Pursuant to the emergency declaration under the [address] [place] [address], [address] [place] [address] [facility] authority and the [employer] Act, this Virtual Visit was conducted with patient's (and/or legal guardian's) consent, to reduce the patient's risk of exposure to COVID-19 and provide necessary medical care. The patient (and/or legal guardian) has also been advised to contact this office for worsening conditions or problems, and seek emergency medical treatment and/or call 911 if deemed necessary. Services were provided through a video synchronous discussion virtually to substitute for in-person clinic visit. Patient was located at their individual homes. Provider was located in [place] [address].     Electronically signed by Lupe Prince MD on 10/8/2020 at 1:41 PM

## 2020-10-15 ENCOUNTER — HOSPITAL ENCOUNTER (OUTPATIENT)
Dept: WOUND CARE | Age: 80
Discharge: HOME | End: 2020-10-15
Payer: MEDICARE

## 2020-10-15 VITALS — HEIGHT: 68 IN | BODY MASS INDEX: 28.64 KG/M2 | WEIGHT: 189 LBS

## 2020-10-15 PROCEDURE — 99211 OFF/OP EST MAY X REQ PHY/QHP: CPT

## 2020-10-15 PROCEDURE — 99212 OFFICE O/P EST SF 10 MIN: CPT | Performed by: SURGERY

## 2020-10-15 ASSESSMENT — PAIN SCALES - GENERAL: PAINLEVEL_OUTOF10: 0

## 2020-10-15 NOTE — PROGRESS NOTES
Virtual Visit Wound Care  Clinic Level of Care   NAME:  Heather Zamora  YOB: 1940 GENDER: female  MEDICAL RECORD NUMBER:  [id number]   DATE:  10/15/2020     Patient Type Points   No documentation completed by nursing staff. []   0   Nursing staff documented in the navigator for an ESTABLISHED patient including Episode, Patient ID, Chief Complaint, Travel Screen, Allergies, Latex Allergy, Home Medication, History, Psychosocial Screen, C-SSRS Screen, Fall Risk, Nutritional Screen, Advanced Directive, Education and Plan of Care, and Discharge Instructions. The Functional Screening tab is only required if the patient's status changes. []   1   Nursing staff documented in the navigator for a NEW patient including Patient ID, Chief Complaint, Travel Screen, Allergies, Latex Allergy, Home Medication, History, Psychosocial Screen, C-SSRS Screen, Fall Risk, Nutritional Screen, Advanced Directive, Functional Screen, Education and Plan of Care, and Discharge Instructions. [x]   2   Nursing staff documented in the navigator for a CONSULT patient including Episode, Patient ID, Chief Complaint, Travel Screen, Allergies, Latex Allergy, Home Medication, History, Psychosocial Screen, C-SSRS Screen, Fall Risk, Nutritional Screen, Advanced Directive, Functional Screen, Education and Plan of Care, and Discharge Instructions. []   2     Wound Description Points   Unable to obtain image of Wound. For example, patient/caregiver is instructed not to remove dressing, is unable to correctly position smart phone, no smart phone is available, patient is unable to maintain connectivity or the patient's wound is healed. []   0   1-3 wound images annotated. Images of the wound(s) is obtained and annotated along with completed description in [address] [place] [address]. [x]   1   4-5 wound images annotated. Images of the wound(s) is obtained and annotated along with completed description in [address] [place] [address]. []   2   Greater than 6 wound images annotated.  Images of the wound(s) is obtained and annotated along with completed description in [facility]. []   3     Education Points   No Education completed by nursing staff.    []   0   Patient/caregiver is educated on 1-4 topics. Nursing staff identifies learner, confirms understanding of information (verbal, demonstration, written) and documents details. May include Discharge Instructions/AVS, available documents in My Chart, or Web-based learning. [x]   1   Patient/caregiver is educated on 5-9 topics. Nursing staff identifies learner, confirms understanding of information (verbal, demonstration, written) and documents details. May include Discharge Instructions/AVS, available documents in My Chart, or Web-based learning. []   2   Patient/caregiver is educated on 10 or more topics. Nursing staff identifies learner, confirms understanding of information (verbal, demonstration, written) and documents details. May include Discharge Instructions/AVS, available documents in My Chart, or Web-based learning. []   3     Follow-up Virtual Visit Points   No contact with outside resources made. [x]   0   Nursing staff contacts 1-2 outside resource. For example, telephone call made to home health, primary care provider, pharmacy, or DME. May include filling out forms and writing letters, arranging transportation, communication with insurance , vendors, etc.  Discharge, instructions and/or After Visit Summary given to patient/caregiver and instructions completed. []   1   Nursing staff contacts 3-4 outside resource. For example, telephone calls made to home health, primary care provider, pharmacy, or DME. May include filling out forms and writing letters, arranging transportation, communication with insurance , vendors, etc.  Discharge, instructions and/or After Visit Summary given to patient/caregiver and instructions completed. []   2   Nursing contacts 5 or more outside resource.  For example, telephone calls made to home health, primary care providers, pharmacy, or DME. May include filling out forms and writing letters, arranging transportation, communication with insurance , vendors, etc.  Discharge, instructions and/or After Visit Summary given to patient/caregiver and instructions completed.    []   3     Is this the Patient's First Visit to the [address] [place] [address] Road    No    Is this Patient Established @ [facility]  Yes             Virtual Visit Clinical Level of Care Wound Care      Points  1-3  Level 1 [x]     Points  4-5  Level 2 []     Points  6-7  Level 3 []     Points  8-9  Level 4 []     Points  10-11  Level 5 []       Electronically signed by Yasmine Ford RN on 10/15/2020 at @NO

## 2020-10-15 NOTE — PROGRESS NOTES
[Morphine] Anaphylaxis     Pt states she quit breathing      Ciprocinonide [Fluocinolone]     Codeine Phosphate [Codeine] Nausea Only    Quinolones        MEDICATIONS    Current Outpatient Medications on File Prior to Encounter   Medication Sig Dispense Refill    atorvastatin (LIPITOR) 20 MG tablet Take 20 mg by mouth daily       melatonin 5 MG TABS tablet Take 10 mg by mouth nightly      bisacodyl (DULCOLAX) 5 MG EC tablet Take 5 mg by mouth daily as needed for Constipation      guaiFENesin (MUCINEX) 600 MG extended release tablet Take 1,200 mg by mouth 2 times daily      vitamin D 1000 units CAPS Take 1 capsule by mouth daily      traZODone (DESYREL) 50 MG tablet Take 50 mg by mouth daily      menthol-zinc oxide (CALMOSEPTINE) 0.44-20.625 % OINT ointment Apply topically daily Max 30 ml per day.  omeprazole (PRILOSEC) 20 MG delayed release capsule Take 20 mg by mouth daily      magnesium citrate solution Take 296 mLs by mouth once as needed for Constipation      tolnaftate (TINACTIN) 1 % cream Apply topically nightly       traMADol (ULTRAM ER) 300 MG extended releae tablet Take 300 mg by mouth daily.       rOPINIRole (REQUIP) 0.5 MG tablet Take 0.5 mg by mouth nightly       Multiple Vitamins-Minerals (THERAPEUTIC MULTIVITAMIN-MINERALS) tablet Take 1 tablet by mouth daily      furosemide (LASIX) 40 MG tablet Take 40 mg by mouth 2 times daily       citalopram (CELEXA) 40 MG tablet Take 40 mg by mouth daily       traMADol (ULTRAM) 50 MG tablet Take 50 mg by mouth nightly       ipratropium (ATROVENT) 0.03 % nasal spray 2 sprays by Nasal route 3 times daily as needed       bacitracin 500 UNIT/GM ophthalmic ointment Place into the left eye nightly       acetaminophen (TYLENOL) 325 MG tablet Take 325 mg by mouth every 12 hours as needed for Pain       loratadine (CLARITIN) 10 MG tablet Take 10 mg by mouth nightly       baclofen (LIORESAL) 10 MG tablet Take 10 mg by mouth 3 times daily as needed  docusate sodium (COLACE) 100 MG capsule Take 200 mg by mouth 2 times daily        No current facility-administered medications on file prior to encounter. REVIEW OF SYSTEMS    A comprehensive review of systems was negative.     Objective:     PHYSICAL EXAM    General Appearance: alert and oriented to person, place and time, well developed and well- nourished, in no acute distress  Skin: warm and dry, no rash or erythema  Head: normocephalic and atraumatic  Eyes: pupils equal, round, and reactive to light, extraocular eye movements intact, conjunctivae normal  ENT: tympanic membrane, external ear and ear canal normal bilaterally, nose without deformity, nasal mucosa and turbinates normal without polyps  Neck: supple and non-tender without mass, no thyromegaly or thyroid nodules, no cervical lymphadenopathy  Pulmonary/Chest: clear to auscultation bilaterally- no wheezes, rales or rhonchi, normal air movement, no respiratory distress  Cardiovascular: normal rate, regular rhythm, normal S1 and S2, no murmurs, rubs, clicks, or gallops, distal pulses intact, no carotid bruits  Abdomen: soft, non-tender, non-distended, normal bowel sounds, no masses or organomegaly  Extremities: no cyanosis, clubbing or edema  Musculoskeletal: normal range of motion, no joint swelling, deformity or tenderness  Neurologic: reflexes normal and symmetric, no cranial nerve deficit, gait, coordination and speech normal    Assessment:      Problem List Items Addressed This Visit     * (Principal) Decubitus ulcer of sacral region, stage 4 (HCC) (Chronic)    Incontinence of feces (Chronic)    Malnutrition (HCC)          Performed by: Alin Mendes MD    Wound/Ulcer #: 1    Wound 10/12/17 Coccyx Wound 1, Pressure Stage 4, Coccyx (Active)   Wound Image   10/08/20 1331   Wound Etiology Pressure Stage  4 10/08/20 1331   Wound Cleansed Rinsed/Irrigated with saline 03/10/20 1333   Dressing/Treatment Alginate with Ag;ABD 02/11/20 6294 Offloading for Diabetic Foot Ulcers Yes (type) 10/08/20 1331   Wound Length (cm) 0.6 cm 09/18/20 0907   Wound Width (cm) 0.6 cm 09/18/20 0907   Wound Depth (cm) 0.5 cm 09/18/20 0907   Wound Surface Area (cm^2) 0.36 cm^2 09/18/20 0907   Change in Wound Size % (l*w) 93.68 09/18/20 0907   Wound Volume (cm^3) 0.18 cm^3 09/18/20 0907   Wound Healing % 99 09/18/20 0907   Post-Procedure Length (cm) 0.9 cm 03/10/20 1347   Post-Procedure Width (cm) 0.7 cm 03/10/20 1347   Post-Procedure Depth (cm) 0.9 cm 03/10/20 1347   Post-Procedure Surface Area (cm^2) 0.63 cm^2 03/10/20 1347   Post-Procedure Volume (cm^3) 0.57 cm^3 03/10/20 1347   Distance Tunneling (cm) 0 cm 03/10/20 1333   Tunneling Position ___ O'Clock 0 03/10/20 1333   Undermining Starts ___ O'Clock 0 03/10/20 1333   Undermining Ends___ O'Clock 0 03/10/20 1333   Undermining Maxium Distance (cm) 0 03/10/20 1333   Drainage Amount Moderate 10/08/20 1331   Drainage Description Serosanguinous 10/08/20 1331   Odor None 10/08/20 1331   Margins Attached edges 10/08/20 1331   Wound Thickness Description not for Pressure Injury Not applicable 63/84/79 4605   Number of days: 1099          Diabetic/Pressure/Non Pressure Ulcers only:  Ulcer: Pressure ulcer, Stage 4       Plan:     Please see attached Discharge Instructions    Based upon this virtual visit it is not required to have an in-person visit of this time.             Written patient dismissal instructions available to Patient/POA       Discharge Instructions         [address] [facility]   Physician Orders and Discharge Instructions  [address]  Telephone: [phone]    NAME:  Consuelo Wynn OF BIRTH:  1940  MEDICAL RECORD NUMBER:  [id number]  DATE:  10/15/2020    Discharge condition: Stable    Discharge to: Home    Left via:Private automobile    Accompanied by: Nurse     REILLY/AMMY: [id number]       Dressing Orders: Coccyx:   Wash with soap and water. Bactroban Ointment to open area, Cover with dry gauze Hold in place with under garments  Apply Barrier Cream to Sabine Wound as needed    Treatment Orders:   Avoid Pressure to wound site. Turn frequently (turn at least every two hours when in bed)  Off-load heels by applying heel-lift boots or \"float\" heels by placing pillows under calves so that heels do not touch mattress. Continue Protein rich diet (unless restricted by your physician). Please continue Protein supplements. Take Multivitamin daily  Please use low air loss bed  Please use ROHO Cushion in wheelchair    [facility] followup visit _____________1 week________________  (Please note your next appointment above and if you are unable to keep, kindly give a 24 hour notice. Thank you.)    If you experience any of the following, please call the [address] [place] [address] during business hours:    * Increase in Pain  * Temperature over 101  * Increase in drainage from your wound  * Drainage with a foul odor  * Bleeding  * Increase in swelling  * Need for compression bandage changes due to slippage, breakthrough drainage. If you need medical attention outside of the business hours of the [address] [place] [address] Road please contact your PCP or go to the nearest emergency roomJunior Yenifer Martin AGE: 79 y.o. GENDER: female  : 1940 being evaluated by a Virtual Visit (video visit) encounter to address concerns as mentioned above. A caregiver was present when appropriate. Due to this being a TeleHealth encounter (During [facility]- public health emergency), evaluation of the following organ systems was limited: Vitals/Constitutional/EENT/Resp/CV/GI//MS/Neuro/Skin/Heme-Lymph-Imm.   Pursuant to the emergency declaration under the [facility], [address] waiver authority and the [employer] Act, this Virtual Visit was conducted with

## 2020-10-22 ENCOUNTER — HOSPITAL ENCOUNTER (OUTPATIENT)
Dept: WOUND CARE | Age: 80
Discharge: HOME | End: 2020-10-22
Payer: MEDICARE

## 2020-10-22 VITALS — HEIGHT: 68 IN | BODY MASS INDEX: 28.64 KG/M2 | WEIGHT: 189 LBS

## 2020-10-22 PROCEDURE — 99211 OFF/OP EST MAY X REQ PHY/QHP: CPT

## 2020-10-22 PROCEDURE — 99212 OFFICE O/P EST SF 10 MIN: CPT | Performed by: SURGERY

## 2020-10-22 ASSESSMENT — PAIN SCALES - GENERAL: PAINLEVEL_OUTOF10: 0

## 2020-10-22 NOTE — PROGRESS NOTES
Virtual Visit Via [employer]   Progress Note and Procedure Note    Alex Mckeon  MEDICAL RECORD NUMBER:  [id number]  AGE: 79 y.o. GENDER: female  : 1940  EPISODE DATE:  10/22/2020    Subjective:     Chief Complaint   Patient presents with    Wound Check     Wound Check        Consent obtained to communicate via Virtual Visit:  Yes     HISTORY of PRESENT ILLNESS HPI     Alex Mckeon is a 79 y.o. female who presents today via Virtual Visit wound/ulcer evaluation.    History of Wound Context: Pt with sacral wound here for eval/treat  Wound/Ulcer Pain Timing/Severity: none  Quality of pain: N/A  Severity:  0 / 10   Modifying Factors: None  Associated Signs/Symptoms: none    Ulcer Identification:  Ulcer Type: pressure    Contributing Factors: chronic pressure, decreased mobility, shear force and obesity    Acute Wound: N/A not an acute wound    PAST MEDICAL HISTORY        Diagnosis Date    CAD (coronary artery disease)     Depressive disorder     GERD (gastroesophageal reflux disease)     bleeding ulcer    Hemorrhage of gastrointestinal tract     Hyperlipidemia     Hypertension     Osteoarthrosis     Pacemaker     Retention of fluid     Sick sinus syndrome (HCC)     Sinus node dysfunction (HCC)        PAST SURGICAL HISTORY    Past Surgical History:   Procedure Laterality Date    APPENDECTOMY      BACK SURGERY      CHOLECYSTECTOMY      COLONOSCOPY      DILATATION, ESOPHAGUS      EYE SURGERY      HYSTERECTOMY      PACEMAKER PLACEMENT  2010    [employer]       FAMILY HISTORY    Family History   Problem Relation Age of Onset    Heart Disease Mother     Heart Disease Father        SOCIAL HISTORY    Social History     Tobacco Use    Smoking status: Former Smoker     Types: Cigarettes    Smokeless tobacco: Never Used   Substance Use Topics    Alcohol use: No    Drug use: No       ALLERGIES    Allergies   Allergen Reactions    Morphine Sulfate [Morphine] Anaphylaxis     Pt states she quit breathing      Ciprocinonide [Fluocinolone]     Codeine Phosphate [Codeine] Nausea Only    Quinolones        MEDICATIONS    Current Outpatient Medications on File Prior to Encounter   Medication Sig Dispense Refill    atorvastatin (LIPITOR) 20 MG tablet Take 20 mg by mouth daily       melatonin 5 MG TABS tablet Take 10 mg by mouth nightly      bisacodyl (DULCOLAX) 5 MG EC tablet Take 5 mg by mouth daily as needed for Constipation      guaiFENesin (MUCINEX) 600 MG extended release tablet Take 1,200 mg by mouth 2 times daily      vitamin D 1000 units CAPS Take 1 capsule by mouth daily      traZODone (DESYREL) 50 MG tablet Take 50 mg by mouth daily      menthol-zinc oxide (CALMOSEPTINE) 0.44-20.625 % OINT ointment Apply topically daily Max 30 ml per day.  omeprazole (PRILOSEC) 20 MG delayed release capsule Take 20 mg by mouth daily      magnesium citrate solution Take 296 mLs by mouth once as needed for Constipation      tolnaftate (TINACTIN) 1 % cream Apply topically nightly       traMADol (ULTRAM ER) 300 MG extended releae tablet Take 300 mg by mouth daily.       rOPINIRole (REQUIP) 0.5 MG tablet Take 0.5 mg by mouth nightly       Multiple Vitamins-Minerals (THERAPEUTIC MULTIVITAMIN-MINERALS) tablet Take 1 tablet by mouth daily      furosemide (LASIX) 40 MG tablet Take 40 mg by mouth 2 times daily       citalopram (CELEXA) 40 MG tablet Take 40 mg by mouth daily       traMADol (ULTRAM) 50 MG tablet Take 50 mg by mouth nightly       ipratropium (ATROVENT) 0.03 % nasal spray 2 sprays by Nasal route 3 times daily as needed       bacitracin 500 UNIT/GM ophthalmic ointment Place into the left eye nightly       acetaminophen (TYLENOL) 325 MG tablet Take 325 mg by mouth every 12 hours as needed for Pain       loratadine (CLARITIN) 10 MG tablet Take 10 mg by mouth nightly       baclofen (LIORESAL) 10 MG tablet Take 10 mg by mouth 3 times daily as needed       docusate sodium (COLACE) 100 MG capsule Take 200 mg by mouth 2 times daily        No current facility-administered medications on file prior to encounter. REVIEW OF SYSTEMS    A comprehensive review of systems was negative.     Objective:     PHYSICAL EXAM    General Appearance: alert and oriented to person, place and time, well developed and well- nourished, in no acute distress  Skin: warm and dry, no rash or erythema  Head: normocephalic and atraumatic  Eyes: pupils equal, round, and reactive to light, extraocular eye movements intact, conjunctivae normal  ENT: tympanic membrane, external ear and ear canal normal bilaterally, nose without deformity, nasal mucosa and turbinates normal without polyps  Neck: supple and non-tender without mass, no thyromegaly or thyroid nodules, no cervical lymphadenopathy  Pulmonary/Chest: clear to auscultation bilaterally- no wheezes, rales or rhonchi, normal air movement, no respiratory distress  Cardiovascular: normal rate, regular rhythm, normal S1 and S2, no murmurs, rubs, clicks, or gallops, distal pulses intact, no carotid bruits  Abdomen: soft, non-tender, non-distended, normal bowel sounds, no masses or organomegaly  Extremities: no cyanosis, clubbing or edema  Musculoskeletal: normal range of motion, no joint swelling, deformity or tenderness  Neurologic: reflexes normal and symmetric, no cranial nerve deficit, gait, coordination and speech normal    Assessment:      Problem List Items Addressed This Visit     * (Principal) Decubitus ulcer of sacral region, stage 4 (HCC) (Chronic)    Incontinence of feces (Chronic)    Malnutrition (HCC)          Performed by: Jaime Kauffman MD    Wound/Ulcer #: 1    Wound 10/12/17 Coccyx Wound 1, Pressure Stage 4, Coccyx (Active)   Wound Image   10/22/20 1325   Wound Etiology Diabetic Wilks 4 10/22/20 1325   Wound Cleansed Rinsed/Irrigated with saline 03/10/20 1333   Dressing/Treatment Alginate with Ag;ABD 02/11/20 1982 Offloading for Diabetic Foot Ulcers Yes (type) 10/22/20 1325   Wound Length (cm) 0.3 cm 10/15/20 1345   Wound Width (cm) 0.3 cm 10/15/20 1345   Wound Depth (cm) 0.2 cm 10/15/20 1345   Wound Surface Area (cm^2) 0.09 cm^2 10/15/20 1345   Change in Wound Size % (l*w) 98.42 10/15/20 1345   Wound Volume (cm^3) 0.02 cm^3 10/15/20 1345   Wound Healing % 100 10/15/20 1345   Post-Procedure Length (cm) 0.9 cm 03/10/20 1347   Post-Procedure Width (cm) 0.7 cm 03/10/20 1347   Post-Procedure Depth (cm) 0.9 cm 03/10/20 1347   Post-Procedure Surface Area (cm^2) 0.63 cm^2 03/10/20 1347   Post-Procedure Volume (cm^3) 0.57 cm^3 03/10/20 1347   Distance Tunneling (cm) 0 cm 03/10/20 1333   Tunneling Position ___ O'Clock 0 03/10/20 1333   Undermining Starts ___ O'Clock 0 03/10/20 1333   Undermining Ends___ O'Clock 0 03/10/20 1333   Undermining Maxium Distance (cm) 0 03/10/20 1333   Drainage Amount Scant 10/22/20 1325   Drainage Description Serous 10/22/20 1325   Odor None 10/15/20 1345   Sabine-wound Assessment Intact 10/22/20 1325   Margins Attached edges 10/22/20 1325   Wound Thickness Description not for Pressure Injury Full thickness 10/22/20 1325   Number of days: 1106          Diabetic/Pressure/Non Pressure Ulcers only:  Ulcer: Pressure ulcer, Stage 4       Plan:     Please see attached Discharge Instructions    Based upon this virtual visit it is not required to have an in-person visit of this time.             Written patient dismissal instructions available to Patient/POA       Discharge Instructions         [address] [facility]   Physician Orders and Discharge Instructions  [address]  Telephone: [phone]    NAME:  Kathy Le OF BIRTH:  1940  MEDICAL RECORD NUMBER:  [id number]  DATE:  10/22/2020    Discharge condition: Stable    Discharge to: Home    Left via:Private automobile    Accompanied by: Nurse     ECF/H[facility]: [facility]       Dressing Orders: Coccyx:   Wash with soap and water. Bactroban Ointment to open area, Cover with dry gauze Hold in place with under garments  Apply Barrier Cream to Sabine Wound as needed    Treatment Orders:   Avoid Pressure to wound site. Turn frequently (turn at least every two hours when in bed)  Off-load heels by applying heel-lift boots or \"float\" heels by placing pillows under calves so that heels do not touch mattress. Continue Protein rich diet (unless restricted by your physician). Please continue Protein supplements. Take Multivitamin daily  Please use low air loss bed  Please use ROHO Cushion in wheelchair    [facility] followup visit ____________1 week video visit_________________  (Please note your next appointment above and if you are unable to keep, kindly give a 24 hour notice. Thank you.)    If you experience any of the following, please call the [facility] during business hours:    * Increase in Pain  * Temperature over 101  * Increase in drainage from your wound  * Drainage with a foul odor  * Bleeding  * Increase in swelling  * Need for compression bandage changes due to slippage, breakthrough drainage. If you need medical attention outside of the business hours of the [facility] please contact your PCP or go to the nearest emergency room. Travis Martin AGE: 79 y.o. GENDER: female  : 1940 being evaluated by a Virtual Visit (video visit) encounter to address concerns as mentioned above. A caregiver was present when appropriate. Due to this being a TeleHealth encounter (During [facility] public health emergency), evaluation of the following organ systems was limited: Vitals/Constitutional/EENT/Resp/CV/GI//MS/Neuro/Skin/Heme-Lymph-Imm.   Pursuant to the emergency declaration under the [facility], [facility] authority and the [email or web address] and [facility]

## 2020-10-22 NOTE — PROGRESS NOTES
Virtual Visit Wound Care  Clinic Level of Care   NAME:  Arnaud Lucas  YOB: 1940 GENDER: female  MEDICAL RECORD NUMBER:  [id number]   DATE:  10/22/2020     Patient Type Points   No documentation completed by nursing staff. []   0   Nursing staff documented in the navigator for an ESTABLISHED patient including Episode, Patient ID, Chief Complaint, Travel Screen, Allergies, Latex Allergy, Home Medication, History, Psychosocial Screen, C-SSRS Screen, Fall Risk, Nutritional Screen, Advanced Directive, Education and Plan of Care, and Discharge Instructions. The Functional Screening tab is only required if the patient's status changes. [x]   1   Nursing staff documented in the navigator for a NEW patient including Patient ID, Chief Complaint, Travel Screen, Allergies, Latex Allergy, Home Medication, History, Psychosocial Screen, C-SSRS Screen, Fall Risk, Nutritional Screen, Advanced Directive, Functional Screen, Education and Plan of Care, and Discharge Instructions. []   2   Nursing staff documented in the navigator for a CONSULT patient including Episode, Patient ID, Chief Complaint, Travel Screen, Allergies, Latex Allergy, Home Medication, History, Psychosocial Screen, C-SSRS Screen, Fall Risk, Nutritional Screen, Advanced Directive, Functional Screen, Education and Plan of Care, and Discharge Instructions. []   2     Wound Description Points   Unable to obtain image of Wound. For example, patient/caregiver is instructed not to remove dressing, is unable to correctly position smart phone, no smart phone is available, patient is unable to maintain connectivity or the patient's wound is healed. []   0   1-3 wound images annotated. Images of the wound(s) is obtained and annotated along with completed description in [address] [place] [address]. [x]   1   4-5 wound images annotated. Images of the wound(s) is obtained and annotated along with completed description in [address] [place] [address]. []   2   Greater than 6 wound images annotated.  Images of the wound(s) is obtained and annotated along with completed description in [address] [place] [address]. []   3     Education Points   No Education completed by nursing staff.    []   0   Patient/caregiver is educated on 1-4 topics. Nursing staff identifies learner, confirms understanding of information (verbal, demonstration, written) and documents details. May include Discharge Instructions/AVS, available documents in My Chart, or Web-based learning. [x]   1   Patient/caregiver is educated on 5-9 topics. Nursing staff identifies learner, confirms understanding of information (verbal, demonstration, written) and documents details. May include Discharge Instructions/AVS, available documents in My Chart, or Web-based learning. []   2   Patient/caregiver is educated on 10 or more topics. Nursing staff identifies learner, confirms understanding of information (verbal, demonstration, written) and documents details. May include Discharge Instructions/AVS, available documents in My Chart, or Web-based learning. []   3     Follow-up Virtual Visit Points   No contact with outside resources made. [x]   0   Nursing staff contacts 1-2 outside resource. For example, telephone call made to home health, primary care provider, pharmacy, or DME. May include filling out forms and writing letters, arranging transportation, communication with insurance , vendors, etc.  Discharge, instructions and/or After Visit Summary given to patient/caregiver and instructions completed. []   1   Nursing staff contacts 3-4 outside resource. For example, telephone calls made to home health, primary care provider, pharmacy, or DME. May include filling out forms and writing letters, arranging transportation, communication with insurance , vendors, etc.  Discharge, instructions and/or After Visit Summary given to patient/caregiver and instructions completed. []   2   Nursing contacts 5 or more outside resource.  For example, telephone calls made to home health, primary care providers, pharmacy, or DME. May include filling out forms and writing letters, arranging transportation, communication with insurance , vendors, etc.  Discharge, instructions and/or After Visit Summary given to patient/caregiver and instructions completed.    []   3     Is this the Patient's First Visit to the [address] [place] [address] Road  No    Is this Patient Established @ [facility]  Yes             Virtual Visit Clinical Level of Care Wound Care      Points  1-3  Level 1 [x]     Points  4-5  Level 2 []     Points  6-7  Level 3 []     Points  8-9  Level 4 []     Points  10-11  Level 5 []       Electronically signed by Sveta Taylor RN on 10/22/2020 at @NO

## 2020-10-29 ENCOUNTER — HOSPITAL ENCOUNTER (OUTPATIENT)
Dept: WOUND CARE | Age: 80
Discharge: HOME | End: 2020-10-29
Payer: MEDICARE

## 2020-10-29 PROCEDURE — 99212 OFFICE O/P EST SF 10 MIN: CPT | Performed by: SURGERY

## 2020-10-29 PROCEDURE — 99211 OFF/OP EST MAY X REQ PHY/QHP: CPT

## 2020-10-29 NOTE — PROGRESS NOTES
Virtual Visit Wound Care  Clinic Level of Care   NAME:  Nance Closs  YOB: 1940 GENDER: female  MEDICAL RECORD NUMBER:  [id number]   DATE:  10/29/2020     Patient Type Points   No documentation completed by nursing staff. []   0   Nursing staff documented in the navigator for an ESTABLISHED patient including Episode, Patient ID, Chief Complaint, Travel Screen, Allergies, Latex Allergy, Home Medication, History, Psychosocial Screen, C-SSRS Screen, Fall Risk, Nutritional Screen, Advanced Directive, Education and Plan of Care, and Discharge Instructions. The Functional Screening tab is only required if the patient's status changes. [x]   1   Nursing staff documented in the navigator for a NEW patient including Patient ID, Chief Complaint, Travel Screen, Allergies, Latex Allergy, Home Medication, History, Psychosocial Screen, C-SSRS Screen, Fall Risk, Nutritional Screen, Advanced Directive, Functional Screen, Education and Plan of Care, and Discharge Instructions. []   2   Nursing staff documented in the navigator for a CONSULT patient including Episode, Patient ID, Chief Complaint, Travel Screen, Allergies, Latex Allergy, Home Medication, History, Psychosocial Screen, C-SSRS Screen, Fall Risk, Nutritional Screen, Advanced Directive, Functional Screen, Education and Plan of Care, and Discharge Instructions. []   2     Wound Description Points   Unable to obtain image of Wound. For example, patient/caregiver is instructed not to remove dressing, is unable to correctly position smart phone, no smart phone is available, patient is unable to maintain connectivity or the patient's wound is healed. []   0   1-3 wound images annotated. Images of the wound(s) is obtained and annotated along with completed description in [address] [place] [address]. [x]   1   4-5 wound images annotated. Images of the wound(s) is obtained and annotated along with completed description in [address] [place] [address]. []   2   Greater than 6 wound images annotated.  Images of made to home health, primary care providers, pharmacy, or DME. May include filling out forms and writing letters, arranging transportation, communication with insurance , vendors, etc.  Discharge, instructions and/or After Visit Summary given to patient/caregiver and instructions completed.    []   3     Is this the Patient's First Visit to the [address] [place] [address] Road  No    Is this Patient Established @ [facility]  Yes             Virtual Visit Clinical Level of Care Wound Care      Points  1-3  Level 1 [x]     Points  4-5  Level 2 []     Points  6-7  Level 3 []     Points  8-9  Level 4 []     Points  10-11  Level 5 []       Electronically signed by Fatimah Conti RN on 10/29/2020 at @NO

## 2020-10-29 NOTE — PROGRESS NOTES
Virtual Visit [facility] [employer]   Progress Note and Procedure Note    Heather Zamora  MEDICAL RECORD NUMBER:  [id number]  AGE: 79 y.o. GENDER: female  : 1940  EPISODE DATE:  10/29/2020    Subjective:     Chief Complaint   Patient presents with    Wound Check     Wound Check        Consent obtained to communicate via Virtual Visit:  Yes     HISTORY of PRESENT ILLNESS HPI     Heather Zamora is a 79 y.o. female who presents today via Virtual Visit wound/ulcer evaluation.    History of Wound Context: Pt with sacral wound being seen by virtual visit for eval/treat  Wound/Ulcer Pain Timing/Severity: none  Quality of pain: N/A  Severity:  0 / 10   Modifying Factors: None  Associated Signs/Symptoms: none    Ulcer Identification:  Ulcer Type: pressure    Contributing Factors: chronic pressure, decreased mobility, shear force and obesity    Acute Wound: N/A not an acute wound    PAST MEDICAL HISTORY        Diagnosis Date    CAD (coronary artery disease)     Depressive disorder     GERD (gastroesophageal reflux disease)     bleeding ulcer    Hemorrhage of gastrointestinal tract     Hyperlipidemia     Hypertension     Osteoarthrosis     Pacemaker     Retention of fluid     Sick sinus syndrome (HCC)     Sinus node dysfunction (HCC)        PAST SURGICAL HISTORY    Past Surgical History:   Procedure Laterality Date    APPENDECTOMY      BACK SURGERY      CHOLECYSTECTOMY      COLONOSCOPY      DILATATION, ESOPHAGUS      EYE SURGERY      HYSTERECTOMY      PACEMAKER PLACEMENT  2010    [employer]       FAMILY HISTORY    Family History   Problem Relation Age of Onset    Heart Disease Mother     Heart Disease Father        SOCIAL HISTORY    Social History     Tobacco Use    Smoking status: Former Smoker     Types: Cigarettes    Smokeless tobacco: Never Used   Substance Use Topics    Alcohol use: No    Drug use: No       ALLERGIES    Allergies   Allergen Reactions    Morphine Sulfate [Morphine] Anaphylaxis     Pt states she quit breathing      Ciprocinonide [Fluocinolone]     Codeine Phosphate [Codeine] Nausea Only    Quinolones        MEDICATIONS    Current Outpatient Medications on File Prior to Encounter   Medication Sig Dispense Refill    atorvastatin (LIPITOR) 20 MG tablet Take 20 mg by mouth daily       melatonin 5 MG TABS tablet Take 10 mg by mouth nightly      bisacodyl (DULCOLAX) 5 MG EC tablet Take 5 mg by mouth daily as needed for Constipation      guaiFENesin (MUCINEX) 600 MG extended release tablet Take 1,200 mg by mouth 2 times daily      vitamin D 1000 units CAPS Take 1 capsule by mouth daily      traZODone (DESYREL) 50 MG tablet Take 50 mg by mouth daily      menthol-zinc oxide (CALMOSEPTINE) 0.44-20.625 % OINT ointment Apply topically daily Max 30 ml per day.  omeprazole (PRILOSEC) 20 MG delayed release capsule Take 20 mg by mouth daily      magnesium citrate solution Take 296 mLs by mouth once as needed for Constipation      tolnaftate (TINACTIN) 1 % cream Apply topically nightly       traMADol (ULTRAM ER) 300 MG extended releae tablet Take 300 mg by mouth daily.       rOPINIRole (REQUIP) 0.5 MG tablet Take 0.5 mg by mouth nightly       Multiple Vitamins-Minerals (THERAPEUTIC MULTIVITAMIN-MINERALS) tablet Take 1 tablet by mouth daily      furosemide (LASIX) 40 MG tablet Take 40 mg by mouth 2 times daily       citalopram (CELEXA) 40 MG tablet Take 40 mg by mouth daily       traMADol (ULTRAM) 50 MG tablet Take 50 mg by mouth nightly       ipratropium (ATROVENT) 0.03 % nasal spray 2 sprays by Nasal route 3 times daily as needed       bacitracin 500 UNIT/GM ophthalmic ointment Place into the left eye nightly       acetaminophen (TYLENOL) 325 MG tablet Take 325 mg by mouth every 12 hours as needed for Pain       loratadine (CLARITIN) 10 MG tablet Take 10 mg by mouth nightly       baclofen (LIORESAL) 10 MG tablet Take 10 mg by mouth 3 times daily as needed       docusate sodium (COLACE) 100 MG capsule Take 200 mg by mouth 2 times daily        No current facility-administered medications on file prior to encounter. REVIEW OF SYSTEMS    A comprehensive review of systems was negative.     Objective:     PHYSICAL EXAM    General Appearance: alert and oriented to person, place and time, well developed and well- nourished, in no acute distress  Skin: warm and dry, no rash or erythema  Head: normocephalic and atraumatic  Eyes: pupils equal, round, and reactive to light, extraocular eye movements intact, conjunctivae normal  ENT: tympanic membrane, external ear and ear canal normal bilaterally, nose without deformity, nasal mucosa and turbinates normal without polyps  Neck: supple and non-tender without mass, no thyromegaly or thyroid nodules, no cervical lymphadenopathy  Pulmonary/Chest: clear to auscultation bilaterally- no wheezes, rales or rhonchi, normal air movement, no respiratory distress  Cardiovascular: normal rate, regular rhythm, normal S1 and S2, no murmurs, rubs, clicks, or gallops, distal pulses intact, no carotid bruits  Abdomen: soft, non-tender, non-distended, normal bowel sounds, no masses or organomegaly  Extremities: no cyanosis, clubbing or edema  Musculoskeletal: normal range of motion, no joint swelling, deformity or tenderness  Neurologic: reflexes normal and symmetric, no cranial nerve deficit, gait, coordination and speech normal    Assessment:      Problem List Items Addressed This Visit     * (Principal) Decubitus ulcer of sacral region, stage 4 (HCC) (Chronic)    Incontinence of feces (Chronic)    Malnutrition (HCC)          Performed by: Lupe Prince MD    Wound/Ulcer #: 1    Wound 10/12/17 Coccyx Wound 1, Pressure Stage 4, Coccyx (Active)   Wound Image   10/22/20 1325   Wound Etiology Diabetic Wilks 4 10/22/20 1325   Wound Cleansed Rinsed/Irrigated with saline 03/10/20 1333   Dressing/Treatment Alginate with Ag;ABD 02/11/20 1448   Offloading for Diabetic Foot Ulcers Yes (type) 10/22/20 1325   Wound Length (cm) 0.3 cm 10/15/20 1345   Wound Width (cm) 0.3 cm 10/15/20 1345   Wound Depth (cm) 0.2 cm 10/15/20 1345   Wound Surface Area (cm^2) 0.09 cm^2 10/15/20 1345   Change in Wound Size % (l*w) 98.42 10/15/20 1345   Wound Volume (cm^3) 0.02 cm^3 10/15/20 1345   Wound Healing % 100 10/15/20 1345   Post-Procedure Length (cm) 0.9 cm 03/10/20 1347   Post-Procedure Width (cm) 0.7 cm 03/10/20 1347   Post-Procedure Depth (cm) 0.9 cm 03/10/20 1347   Post-Procedure Surface Area (cm^2) 0.63 cm^2 03/10/20 1347   Post-Procedure Volume (cm^3) 0.57 cm^3 03/10/20 1347   Distance Tunneling (cm) 0 cm 03/10/20 1333   Tunneling Position ___ O'Clock 0 03/10/20 1333   Undermining Starts ___ O'Clock 0 03/10/20 1333   Undermining Ends___ O'Clock 0 03/10/20 1333   Undermining Maxium Distance (cm) 0 03/10/20 1333   Drainage Amount Scant 10/22/20 1325   Drainage Description Serous 10/22/20 1325   Odor None 10/15/20 1345   Sabine-wound Assessment Intact 10/22/20 1325   Margins Attached edges 10/22/20 1325   Wound Thickness Description not for Pressure Injury Full thickness 10/22/20 1325   Number of days: 1113          Diabetic/Pressure/Non Pressure Ulcers only:  Ulcer: Pressure ulcer, Stage 4       Plan:     Please see attached Discharge Instructions    Based upon this virtual visit it is not required to have an in-person visit of this time.             Wound healing nicely    Written patient dismissal instructions available to Patient/POA       Discharge Instructions         [address] [facility]   Physician Orders and Discharge Instructions  [address]  Telephone: [phone]    NAME:  Mitzy Mason OF BIRTH:  1940  MEDICAL RECORD NUMBER:  [id number]  DATE:  10/29/2020    Discharge condition: Stable    Discharge to: Home    Left via:Private Supplemental Appropriations Act, this Virtual Visit was conducted with patient's (and/or legal guardian's) consent, to reduce the patient's risk of exposure to COVID-19 and provide necessary medical care. The patient (and/or legal guardian) has also been advised to contact this office for worsening conditions or problems, and seek emergency medical treatment and/or call 911 if deemed necessary. Services were provided through a video synchronous discussion virtually to substitute for in-person clinic visit. Patient was located at their individual homes. Provider was located in [place] [address].     Electronically signed by Krishna Soto MD on 10/29/2020 at 3:40 PM

## 2020-11-05 ENCOUNTER — HOSPITAL ENCOUNTER (OUTPATIENT)
Dept: WOUND CARE | Age: 80
Discharge: HOME | End: 2020-11-05
Payer: MEDICARE

## 2020-11-05 VITALS — WEIGHT: 189 LBS | HEIGHT: 68 IN | BODY MASS INDEX: 28.64 KG/M2

## 2020-11-05 PROCEDURE — 99212 OFFICE O/P EST SF 10 MIN: CPT | Performed by: SURGERY

## 2020-11-05 PROCEDURE — 99211 OFF/OP EST MAY X REQ PHY/QHP: CPT

## 2020-11-05 ASSESSMENT — PAIN SCALES - GENERAL: PAINLEVEL_OUTOF10: 0

## 2020-11-05 NOTE — PROGRESS NOTES
Virtual Visit Wound Care  Clinic Level of Care   NAME:  Travis Martin  YOB: 1940 GENDER: female  MEDICAL RECORD NUMBER:  [id number]   DATE:  11/5/2020     Patient Type Points   No documentation completed by nursing staff. []   0   Nursing staff documented in the navigator for an ESTABLISHED patient including Episode, Patient ID, Chief Complaint, Travel Screen, Allergies, Latex Allergy, Home Medication, History, Psychosocial Screen, C-SSRS Screen, Fall Risk, Nutritional Screen, Advanced Directive, Education and Plan of Care, and Discharge Instructions. The Functional Screening tab is only required if the patient's status changes. [x]   1   Nursing staff documented in the navigator for a NEW patient including Patient ID, Chief Complaint, Travel Screen, Allergies, Latex Allergy, Home Medication, History, Psychosocial Screen, C-SSRS Screen, Fall Risk, Nutritional Screen, Advanced Directive, Functional Screen, Education and Plan of Care, and Discharge Instructions. []   2   Nursing staff documented in the navigator for a CONSULT patient including Episode, Patient ID, Chief Complaint, Travel Screen, Allergies, Latex Allergy, Home Medication, History, Psychosocial Screen, C-SSRS Screen, Fall Risk, Nutritional Screen, Advanced Directive, Functional Screen, Education and Plan of Care, and Discharge Instructions. []   2     Wound Description Points   Unable to obtain image of Wound. For example, patient/caregiver is instructed not to remove dressing, is unable to correctly position smart phone, no smart phone is available, patient is unable to maintain connectivity or the patient's wound is healed. []   0   1-3 wound images annotated. Images of the wound(s) is obtained and annotated along with completed description in [address] [place] [address]. [x]   1   4-5 wound images annotated. Images of the wound(s) is obtained and annotated along with completed description in [address] [place] [address]. []   2   Greater than 6 wound images annotated.  Images of the wound(s) is obtained and annotated along with completed description in [address] [place] [address]. []   3     Education Points   No Education completed by nursing staff.    []   0   Patient/caregiver is educated on 1-4 topics. Nursing staff identifies learner, confirms understanding of information (verbal, demonstration, written) and documents details. May include Discharge Instructions/AVS, available documents in My Chart, or Web-based learning. [x]   1   Patient/caregiver is educated on 5-9 topics. Nursing staff identifies learner, confirms understanding of information (verbal, demonstration, written) and documents details. May include Discharge Instructions/AVS, available documents in My Chart, or Web-based learning. []   2   Patient/caregiver is educated on 10 or more topics. Nursing staff identifies learner, confirms understanding of information (verbal, demonstration, written) and documents details. May include Discharge Instructions/AVS, available documents in My Chart, or Web-based learning. []   3     Follow-up Virtual Visit Points   No contact with outside resources made. [x]   0   Nursing staff contacts 1-2 outside resource. For example, telephone call made to home health, primary care provider, pharmacy, or DME. May include filling out forms and writing letters, arranging transportation, communication with insurance , vendors, etc.  Discharge, instructions and/or After Visit Summary given to patient/caregiver and instructions completed. []   1   Nursing staff contacts 3-4 outside resource. For example, telephone calls made to home health, primary care provider, pharmacy, or DME. May include filling out forms and writing letters, arranging transportation, communication with insurance , vendors, etc.  Discharge, instructions and/or After Visit Summary given to patient/caregiver and instructions completed. []   2   Nursing contacts 5 or more outside resource.  For example, telephone calls made to home health, primary care providers, pharmacy, or DME. May include filling out forms and writing letters, arranging transportation, communication with insurance , vendors, etc.  Discharge, instructions and/or After Visit Summary given to patient/caregiver and instructions completed.    []   3     Is this the Patient's First Visit to the [address] [place] [address] Road  No    Is this Patient Established @ [facility]  Yes             Virtual Visit Clinical Level of Care Wound Care      Points  1-3  Level 1 [x]     Points  4-5  Level 2 []     Points  6-7  Level 3 []     Points  8-9  Level 4 []     Points  10-11  Level 5 []       Electronically signed by Seda Lyles RN on 11/5/2020 at @NO

## 2020-11-05 NOTE — PROGRESS NOTES
Morphine Sulfate [Morphine] Anaphylaxis     Pt states she quit breathing      Ciprocinonide [Fluocinolone]     Codeine Phosphate [Codeine] Nausea Only    Quinolones        MEDICATIONS    Current Outpatient Medications on File Prior to Encounter   Medication Sig Dispense Refill    atorvastatin (LIPITOR) 20 MG tablet Take 20 mg by mouth daily       melatonin 5 MG TABS tablet Take 10 mg by mouth nightly      bisacodyl (DULCOLAX) 5 MG EC tablet Take 5 mg by mouth daily as needed for Constipation      guaiFENesin (MUCINEX) 600 MG extended release tablet Take 1,200 mg by mouth 2 times daily      vitamin D 1000 units CAPS Take 1 capsule by mouth daily      traZODone (DESYREL) 50 MG tablet Take 50 mg by mouth daily      menthol-zinc oxide (CALMOSEPTINE) 0.44-20.625 % OINT ointment Apply topically daily Max 30 ml per day.  omeprazole (PRILOSEC) 20 MG delayed release capsule Take 20 mg by mouth daily      magnesium citrate solution Take 296 mLs by mouth once as needed for Constipation      tolnaftate (TINACTIN) 1 % cream Apply topically nightly       traMADol (ULTRAM ER) 300 MG extended releae tablet Take 300 mg by mouth daily.       rOPINIRole (REQUIP) 0.5 MG tablet Take 0.5 mg by mouth nightly       Multiple Vitamins-Minerals (THERAPEUTIC MULTIVITAMIN-MINERALS) tablet Take 1 tablet by mouth daily      furosemide (LASIX) 40 MG tablet Take 40 mg by mouth 2 times daily       citalopram (CELEXA) 40 MG tablet Take 40 mg by mouth daily       traMADol (ULTRAM) 50 MG tablet Take 50 mg by mouth nightly       ipratropium (ATROVENT) 0.03 % nasal spray 2 sprays by Nasal route 3 times daily as needed       bacitracin 500 UNIT/GM ophthalmic ointment Place into the left eye nightly       acetaminophen (TYLENOL) 325 MG tablet Take 325 mg by mouth every 12 hours as needed for Pain       loratadine (CLARITIN) 10 MG tablet Take 10 mg by mouth nightly       baclofen (LIORESAL) 10 MG tablet Take 10 mg by mouth 3 times daily as needed       docusate sodium (COLACE) 100 MG capsule Take 200 mg by mouth 2 times daily        No current facility-administered medications on file prior to encounter. REVIEW OF SYSTEMS    A comprehensive review of systems was negative.     Objective:     PHYSICAL EXAM    General Appearance: alert and oriented to person, place and time, well developed and well- nourished, in no acute distress  Skin: warm and dry, no rash or erythema  Head: normocephalic and atraumatic  Eyes: pupils equal, round, and reactive to light, extraocular eye movements intact, conjunctivae normal  ENT: tympanic membrane, external ear and ear canal normal bilaterally, nose without deformity, nasal mucosa and turbinates normal without polyps  Neck: supple and non-tender without mass, no thyromegaly or thyroid nodules, no cervical lymphadenopathy  Pulmonary/Chest: clear to auscultation bilaterally- no wheezes, rales or rhonchi, normal air movement, no respiratory distress  Cardiovascular: normal rate, regular rhythm, normal S1 and S2, no murmurs, rubs, clicks, or gallops, distal pulses intact, no carotid bruits  Abdomen: soft, non-tender, non-distended, normal bowel sounds, no masses or organomegaly  Extremities: no cyanosis, clubbing or edema  Musculoskeletal: normal range of motion, no joint swelling, deformity or tenderness  Neurologic: reflexes normal and symmetric, no cranial nerve deficit, gait, coordination and speech normal    Assessment:      Problem List Items Addressed This Visit     * (Principal) Decubitus ulcer of sacral region, stage 4 (HCC) - Primary (Chronic)    Debilitated (Chronic)    Incontinence of feces (Chronic)    Malnutrition ([facility]          Performed by: Kayleigh Owens MD    Wound/Ulcer #: 1    Wound 10/12/17 Coccyx Wound 1, Pressure Stage 4, Coccyx (Active)   Wound Image   11/05/20 1309   Wound Etiology Pressure Stage  4 11/05/20 1309   Wound Cleansed Rinsed/Irrigated with saline 03/10/20 1712 Dressing/Treatment Alginate with Ag;ABD 20 1448   Offloading for Diabetic Foot Ulcers Yes (type) 10/22/20 1325   Wound Length (cm) 0.3 cm 10/15/20 1345   Wound Width (cm) 0.3 cm 10/15/20 1345   Wound Depth (cm) 0.2 cm 10/15/20 1345   Wound Surface Area (cm^2) 0.09 cm^2 10/15/20 1345   Change in Wound Size % (l*w) 98.42 10/15/20 1345   Wound Volume (cm^3) 0.02 cm^3 10/15/20 1345   Wound Healing % 100 10/15/20 1345   Post-Procedure Length (cm) 0.9 cm 03/10/20 1347   Post-Procedure Width (cm) 0.7 cm 03/10/20 1347   Post-Procedure Depth (cm) 0.9 cm 03/10/20 1347   Post-Procedure Surface Area (cm^2) 0.63 cm^2 03/10/20 1347   Post-Procedure Volume (cm^3) 0.57 cm^3 03/10/20 1347   Distance Tunneling (cm) 0 cm 03/10/20 1333   Tunneling Position ___ O'Clock 0 03/10/20 1333   Undermining Starts ___ O'Clock 0 03/10/20 1333   Undermining Ends___ O'Clock 0 03/10/20 1333   Undermining Maxium Distance (cm) 0 03/10/20 1333   Drainage Amount None 20 1309   Drainage Description Serous 10/22/20 1325   Odor None 20 1309   Sabine-wound Assessment Intact 20 1309   Margins Attached edges 20 1309   Wound Thickness Description not for Pressure Injury Full thickness 20 1309   Number of days: 1120          Diabetic/Pressure/Non Pressure Ulcers only:  Ulcer: Pressure ulcer, Stage 4       Plan:     Please see attached Discharge Instructions    Based upon this virtual visit it is not required to have an in-person visit of this time. Wound almost healed Cont current care. VV in 2 weeks or PRN    Written patient dismissal instructions available to Patient/BEREKET Lees AGE: 79 y.o. GENDER: female  : 1940 being evaluated by a Virtual Visit (video visit) encounter to address concerns as mentioned above. A caregiver was present when appropriate.  Due to this being a TeleHealth encounter (During [place] [address] public health emergency), evaluation of the following organ systems was limited:

## 2020-11-13 ENCOUNTER — TELEPHONE (OUTPATIENT)
Dept: CARDIOLOGY | Age: 80
End: 2020-11-13

## 2020-11-16 PROCEDURE — 93294 REM INTERROG EVL PM/LDLS PM: CPT | Performed by: NURSE PRACTITIONER

## 2020-11-16 PROCEDURE — 93296 REM INTERROG EVL PM/IDS: CPT | Performed by: NURSE PRACTITIONER

## 2020-11-19 ENCOUNTER — HOSPITAL ENCOUNTER (OUTPATIENT)
Dept: WOUND CARE | Age: 80
Discharge: HOME | End: 2020-11-19
Payer: MEDICARE

## 2020-11-19 PROCEDURE — 99211 OFF/OP EST MAY X REQ PHY/QHP: CPT

## 2020-11-19 PROCEDURE — 99212 OFFICE O/P EST SF 10 MIN: CPT | Performed by: SURGERY

## 2020-11-19 NOTE — PLAN OF CARE
Problem: Wound:  Goal: Will show signs of wound healing; wound closure and no evidence of infection  Description: Will show signs of wound healing; wound closure and no evidence of infection   Outcome: Met This Shift     Problem: Pressure Ulcer:  Goal: Signs of wound healing will improve  Description: Signs of wound healing will improve   Outcome: Met This Shift     Problem: Pressure Ulcer:  Goal: Absence of new pressure ulcer  Description: Absence of new pressure ulcer   Outcome: Met This Shift     Problem: Pressure Ulcer:  Goal: Will show no infection signs and symptoms  Description: Will show no infection signs and symptoms   Outcome: Met This Shift

## 2020-11-19 NOTE — PROGRESS NOTES
Virtual Visit Wound Care  Clinic Level of Care   NAME:  Rebecca Mcguire  YOB: 1940 GENDER: female  MEDICAL RECORD NUMBER:  [id number]   DATE:  11/19/2020     Patient Type Points   No documentation completed by nursing staff. []   0   Nursing staff documented in the navigator for an ESTABLISHED patient including Episode, Patient ID, Chief Complaint, Travel Screen, Allergies, Latex Allergy, Home Medication, History, Psychosocial Screen, C-SSRS Screen, Fall Risk, Nutritional Screen, Advanced Directive, Education and Plan of Care, and Discharge Instructions. The Functional Screening tab is only required if the patient's status changes. [x]   1   Nursing staff documented in the navigator for a NEW patient including Patient ID, Chief Complaint, Travel Screen, Allergies, Latex Allergy, Home Medication, History, Psychosocial Screen, C-SSRS Screen, Fall Risk, Nutritional Screen, Advanced Directive, Functional Screen, Education and Plan of Care, and Discharge Instructions. []   2   Nursing staff documented in the navigator for a CONSULT patient including Episode, Patient ID, Chief Complaint, Travel Screen, Allergies, Latex Allergy, Home Medication, History, Psychosocial Screen, C-SSRS Screen, Fall Risk, Nutritional Screen, Advanced Directive, Functional Screen, Education and Plan of Care, and Discharge Instructions. []   2     Wound Description Points   Unable to obtain image of Wound. For example, patient/caregiver is instructed not to remove dressing, is unable to correctly position smart phone, no smart phone is available, patient is unable to maintain connectivity or the patient's wound is healed. []   0   1-3 wound images annotated. Images of the wound(s) is obtained and annotated along with completed description in [address] [place] [address]. [x]   1   4-5 wound images annotated. Images of the wound(s) is obtained and annotated along with completed description in [address] [place] [address]. []   2   Greater than 6 wound images annotated.  Images of the wound(s) is obtained and annotated along with completed description in [address] [place] [address]. []   3     Education Points   No Education completed by nursing staff.    []   0   Patient/caregiver is educated on 1-4 topics. Nursing staff identifies learner, confirms understanding of information (verbal, demonstration, written) and documents details. May include Discharge Instructions/AVS, available documents in My Chart, or Web-based learning. [x]   1   Patient/caregiver is educated on 5-9 topics. Nursing staff identifies learner, confirms understanding of information (verbal, demonstration, written) and documents details. May include Discharge Instructions/AVS, available documents in My Chart, or Web-based learning. []   2   Patient/caregiver is educated on 10 or more topics. Nursing staff identifies learner, confirms understanding of information (verbal, demonstration, written) and documents details. May include Discharge Instructions/AVS, available documents in My Chart, or Web-based learning. []   3     Follow-up Virtual Visit Points   No contact with outside resources made. [x]   0   Nursing staff contacts 1-2 outside resource. For example, telephone call made to home health, primary care provider, pharmacy, or DME. May include filling out forms and writing letters, arranging transportation, communication with insurance , vendors, etc.  Discharge, instructions and/or After Visit Summary given to patient/caregiver and instructions completed. []   1   Nursing staff contacts 3-4 outside resource. For example, telephone calls made to home health, primary care provider, pharmacy, or DME. May include filling out forms and writing letters, arranging transportation, communication with insurance , vendors, etc.  Discharge, instructions and/or After Visit Summary given to patient/caregiver and instructions completed. []   2   Nursing contacts 5 or more outside resource.  For example, telephone calls made to home health, primary care providers, pharmacy, or DME. May include filling out forms and writing letters, arranging transportation, communication with insurance , vendors, etc.  Discharge, instructions and/or After Visit Summary given to patient/caregiver and instructions completed.    []   3     Is this the Patient's First Visit to the [address] [place] [address] Road  No    Is this Patient Established @ [facility]  Yes             Virtual Visit Clinical Level of Care Wound Care      Points  1-3  Level 1 [x]     Points  4-5  Level 2 []     Points  6-7  Level 3 []     Points  8-9  Level 4 []     Points  10-11  Level 5 []       Electronically signed by Ynes Kim RN on 11/19/2020 at @NO

## 2020-11-19 NOTE — PROGRESS NOTES
Virtual Visit [facility] [employer]   Progress Note and Procedure Note    Arnaud Lucas  MEDICAL RECORD NUMBER:  [id number]  AGE: 79 y.o. GENDER: female  : 1940  EPISODE DATE:  2020    Subjective:     Chief Complaint   Patient presents with    Wound Check     Wound Check        Consent obtained to communicate via Virtual Visit:  Yes     HISTORY of PRESENT ILLNESS HPI     Arnaud Lucas is a 79 y.o. female who presents today via Virtual Visit wound/ulcer evaluation.    History of Wound Context: Pt with coccyx wound being seen by virtual visit  Wound/Ulcer Pain Timing/Severity: none  Quality of pain: N/A  Severity:  0 / 10   Modifying Factors: None  Associated Signs/Symptoms: none    Ulcer Identification:  Ulcer Type: pressure    Contributing Factors: chronic pressure and shear force    Acute Wound: N/A not an acute wound    PAST MEDICAL HISTORY        Diagnosis Date    CAD (coronary artery disease)     Depressive disorder     GERD (gastroesophageal reflux disease)     bleeding ulcer    Hemorrhage of gastrointestinal tract     Hyperlipidemia     Hypertension     Osteoarthrosis     Pacemaker     Retention of fluid     Sick sinus syndrome (HCC)     Sinus node dysfunction (HCC)        PAST SURGICAL HISTORY    Past Surgical History:   Procedure Laterality Date    APPENDECTOMY      BACK SURGERY      CHOLECYSTECTOMY      COLONOSCOPY      DILATATION, ESOPHAGUS      EYE SURGERY      HYSTERECTOMY      PACEMAKER PLACEMENT  2010    [employer]       FAMILY HISTORY    Family History   Problem Relation Age of Onset    Heart Disease Mother     Heart Disease Father        SOCIAL HISTORY    Social History     Tobacco Use    Smoking status: Former Smoker     Types: Cigarettes    Smokeless tobacco: Never Used   Substance Use Topics    Alcohol use: No    Drug use: No       ALLERGIES    Allergies   Allergen Reactions    Morphine Sulfate [Morphine] Anaphylaxis     Pt states she quit breathing      Ciprocinonide [Fluocinolone]     Codeine Phosphate [Codeine] Nausea Only    Quinolones        MEDICATIONS    Current Outpatient Medications on File Prior to Encounter   Medication Sig Dispense Refill    atorvastatin (LIPITOR) 20 MG tablet Take 20 mg by mouth daily       melatonin 5 MG TABS tablet Take 10 mg by mouth nightly      bisacodyl (DULCOLAX) 5 MG EC tablet Take 5 mg by mouth daily as needed for Constipation      guaiFENesin (MUCINEX) 600 MG extended release tablet Take 1,200 mg by mouth 2 times daily      vitamin D 1000 units CAPS Take 1 capsule by mouth daily      traZODone (DESYREL) 50 MG tablet Take 50 mg by mouth daily      menthol-zinc oxide (CALMOSEPTINE) 0.44-20.625 % OINT ointment Apply topically daily Max 30 ml per day.  omeprazole (PRILOSEC) 20 MG delayed release capsule Take 20 mg by mouth daily      magnesium citrate solution Take 296 mLs by mouth once as needed for Constipation      tolnaftate (TINACTIN) 1 % cream Apply topically nightly       traMADol (ULTRAM ER) 300 MG extended releae tablet Take 300 mg by mouth daily.       rOPINIRole (REQUIP) 0.5 MG tablet Take 0.5 mg by mouth nightly       Multiple Vitamins-Minerals (THERAPEUTIC MULTIVITAMIN-MINERALS) tablet Take 1 tablet by mouth daily      furosemide (LASIX) 40 MG tablet Take 40 mg by mouth 2 times daily       citalopram (CELEXA) 40 MG tablet Take 40 mg by mouth daily       traMADol (ULTRAM) 50 MG tablet Take 50 mg by mouth nightly       ipratropium (ATROVENT) 0.03 % nasal spray 2 sprays by Nasal route 3 times daily as needed       bacitracin 500 UNIT/GM ophthalmic ointment Place into the left eye nightly       acetaminophen (TYLENOL) 325 MG tablet Take 325 mg by mouth every 12 hours as needed for Pain       loratadine (CLARITIN) 10 MG tablet Take 10 mg by mouth nightly       baclofen (LIORESAL) 10 MG tablet Take 10 mg by mouth 3 times daily as needed       docusate sodium (COLACE) 100 MG capsule Take 200 mg by mouth 2 times daily        No current facility-administered medications on file prior to encounter. REVIEW OF SYSTEMS    A comprehensive review of systems was negative.     Objective:     PHYSICAL EXAM    General Appearance: alert and oriented to person, place and time, well developed and well- nourished, in no acute distress  Skin: warm and dry, no rash or erythema  Head: normocephalic and atraumatic  Eyes: pupils equal, round, and reactive to light, extraocular eye movements intact, conjunctivae normal  ENT: tympanic membrane, external ear and ear canal normal bilaterally, nose without deformity, nasal mucosa and turbinates normal without polyps  Neck: supple and non-tender without mass, no thyromegaly or thyroid nodules, no cervical lymphadenopathy  Pulmonary/Chest: clear to auscultation bilaterally- no wheezes, rales or rhonchi, normal air movement, no respiratory distress  Cardiovascular: normal rate, regular rhythm, normal S1 and S2, no murmurs, rubs, clicks, or gallops, distal pulses intact, no carotid bruits  Abdomen: soft, non-tender, non-distended, normal bowel sounds, no masses or organomegaly  Extremities: no cyanosis, clubbing or edema  Musculoskeletal: normal range of motion, no joint swelling, deformity or tenderness  Neurologic: reflexes normal and symmetric, no cranial nerve deficit, gait, coordination and speech normal    Assessment:      Problem List Items Addressed This Visit     * (Principal) Decubitus ulcer of sacral region, stage 4 (HCC) (Chronic)    Incontinence of feces (Chronic)    Malnutrition ([address]          Performed by: Ruslan Roblero MD    Wound/Ulcer #: 1    Wound 10/12/17 Coccyx Wound 1, Pressure Stage 4, Coccyx (Active)   Wound Image   11/19/20 1353   Wound Etiology Pressure Stage  4 11/19/20 1353   Wound Cleansed Rinsed/Irrigated with saline 03/10/20 1333   Dressing/Treatment Alginate with Ag;ABD 02/11/20 1448   Offloading for Diabetic Foot Ulcers Yes (type) 10/22/20 1325   Wound Length (cm) 0 cm 11/19/20 1353   Wound Width (cm) 0 cm 11/19/20 1353   Wound Depth (cm) 0 cm 11/19/20 1353   Wound Surface Area (cm^2) 0 cm^2 11/19/20 1353   Change in Wound Size % (l*w) 100 11/19/20 1353   Wound Volume (cm^3) 0 cm^3 11/19/20 1353   Wound Healing % 100 11/19/20 1353   Post-Procedure Length (cm) 0.9 cm 03/10/20 1347   Post-Procedure Width (cm) 0.7 cm 03/10/20 1347   Post-Procedure Depth (cm) 0.9 cm 03/10/20 1347   Post-Procedure Surface Area (cm^2) 0.63 cm^2 03/10/20 1347   Post-Procedure Volume (cm^3) 0.57 cm^3 03/10/20 1347   Distance Tunneling (cm) 0 cm 03/10/20 1333   Tunneling Position ___ O'Clock 0 03/10/20 1333   Undermining Starts ___ O'Clock 0 03/10/20 1333   Undermining Ends___ O'Clock 0 03/10/20 1333   Undermining Maxium Distance (cm) 0 03/10/20 1333   Drainage Amount None 11/19/20 1353   Drainage Description Serous 10/22/20 1325   Odor None 11/19/20 1353   Sabine-wound Assessment Intact 11/05/20 1309   Margins Attached edges 11/05/20 1309   Wound Thickness Description not for Pressure Injury Full thickness 11/05/20 1309   Number of days: 1134          Diabetic/Pressure/Non Pressure Ulcers only:  Ulcer: Pressure ulcer, Stage 4       Plan:     Please see attached Discharge Instructions    Based upon this virtual visit it is not required to have an in-person visit of this time.     PT WOUND HEALED TODAY!!            Written patient dismissal instructions available to Patient/POA       Discharge Instructions         [address] [facility]   Physician Orders and Discharge Instructions  [address]  Telephone: [phone]    NAME:  Courtney Junior OF BIRTH:  1940  MEDICAL RECORD NUMBER:  [id number]  DATE:  11/19/2020    Discharge condition: Stable    Discharge to: Home    Left via:Private automobile    Accompanied by: Nurse    ECF/HHA: [facility]-    Dressing Orders: Coccyx:  Healed, Moisturize and Protect  Avoid Pressure to wound site. Turn frequently (turn at least every two hours when in bed)  Off-load heels by applying heel-lift boots or \"float\" heels by placing pillows under calves so that heels do not touch  mattress. Continue Protein rich diet (unless restricted by your physician). Please continue Protein supplements. Take Multivitamin daily  Please use low air loss bed  Please use ROHO Cushion in wheelchair    [facility] follow up visit ____________PRN_________________  (Please note your next appointment above and if you are unable to keep, kindly give a 24 hour notice. Thank you.)    If you experience any of the following, please call the [facility] during business hours:    * Increase in Pain  * Temperature over 101  * Increase in drainage from your wound  * Drainage with a foul odor  * Bleeding  * Increase in swelling  * Need for compression bandage changes due to slippage, breakthrough drainage. If you need medical attention outside of the business hours of the [facility] please contact your PCP or go to the nearest emergency room. Cora Leal AGE: 79 y.o. GENDER: female  : 1940 being evaluated by a Virtual Visit (video visit) encounter to address concerns as mentioned above. A caregiver was present when appropriate. Due to this being a TeleHealth encounter (During DOLJN-75 public health emergency), evaluation of the following organ systems was limited: Vitals/Constitutional/EENT/Resp/CV/GI//MS/Neuro/Skin/Heme-Lymph-Imm. Pursuant to the emergency declaration under the [facility], [address] [place] [address] authority and the [employer] General Act, this Virtual Visit was conducted with patient's (and/or legal guardian's) consent, to reduce the patient's risk of exposure to COVID-19 and provide necessary medical care.   The patient (and/or legal guardian) has also been advised to contact this office for worsening conditions or problems, and seek emergency medical treatment and/or call 911 if deemed necessary. Services were provided through a video synchronous discussion virtually to substitute for in-person clinic visit. Patient was located at their individual homes. Provider was located in [place] [address].     Electronically signed by Jayden Coleman MD on 11/19/2020 at 1:56 PM

## 2020-11-27 ENCOUNTER — HOSPITAL ENCOUNTER (EMERGENCY)
Age: 80
Discharge: HOME | End: 2020-11-27
Attending: EMERGENCY MEDICINE
Payer: MEDICARE

## 2020-11-27 VITALS
BODY MASS INDEX: 27.28 KG/M2 | WEIGHT: 180 LBS | OXYGEN SATURATION: 97 % | HEART RATE: 75 BPM | HEIGHT: 68 IN | DIASTOLIC BLOOD PRESSURE: 56 MMHG | RESPIRATION RATE: 15 BRPM | TEMPERATURE: 97.8 F | SYSTOLIC BLOOD PRESSURE: 94 MMHG

## 2020-11-27 LAB
ALBUMIN SERPL-MCNC: 3.6 G/DL (ref 3.5–5.2)
ALP BLD-CCNC: 79 U/L (ref 35–104)
ALT SERPL-CCNC: 31 U/L (ref 5–33)
ANION GAP SERPL CALCULATED.3IONS-SCNC: 12 MMOL/L (ref 7–19)
AST SERPL-CCNC: 43 U/L (ref 5–32)
BASOPHILS ABSOLUTE: 0 K/UL (ref 0–0.2)
BASOPHILS RELATIVE PERCENT: 0.4 % (ref 0–1)
BILIRUB SERPL-MCNC: 0.5 MG/DL (ref 0.2–1.2)
BUN BLDV-MCNC: 17 MG/DL (ref 8–23)
CALCIUM SERPL-MCNC: 8.9 MG/DL (ref 8.8–10.2)
CHLORIDE BLD-SCNC: 95 MMOL/L (ref 98–111)
CO2: 31 MMOL/L (ref 22–29)
CREAT SERPL-MCNC: 0.5 MG/DL (ref 0.5–0.9)
EOSINOPHILS ABSOLUTE: 0 K/UL (ref 0–0.6)
EOSINOPHILS RELATIVE PERCENT: 0.6 % (ref 0–5)
GFR AFRICAN AMERICAN: >59
GFR NON-AFRICAN AMERICAN: >60
GLUCOSE BLD-MCNC: 89 MG/DL (ref 74–109)
HCT VFR BLD CALC: 43.9 % (ref 37–47)
HEMOGLOBIN: 13.4 G/DL (ref 12–16)
IMMATURE GRANULOCYTES #: 0.1 K/UL
LYMPHOCYTES ABSOLUTE: 0.8 K/UL (ref 1.1–4.5)
LYMPHOCYTES RELATIVE PERCENT: 14 % (ref 20–40)
MAGNESIUM: 2.1 MG/DL (ref 1.6–2.4)
MCH RBC QN AUTO: 28.1 PG (ref 27–31)
MCHC RBC AUTO-ENTMCNC: 30.5 G/DL (ref 33–37)
MCV RBC AUTO: 92 FL (ref 81–99)
MONOCYTES ABSOLUTE: 0.5 K/UL (ref 0–0.9)
MONOCYTES RELATIVE PERCENT: 9.5 % (ref 0–10)
NEUTROPHILS ABSOLUTE: 4 K/UL (ref 1.5–7.5)
NEUTROPHILS RELATIVE PERCENT: 73.8 % (ref 50–65)
PDW BLD-RTO: 15.2 % (ref 11.5–14.5)
PLATELET # BLD: 137 K/UL (ref 130–400)
PMV BLD AUTO: 12.2 FL (ref 9.4–12.3)
POTASSIUM REFLEX MAGNESIUM: 3.4 MMOL/L (ref 3.5–5)
RBC # BLD: 4.77 M/UL (ref 4.2–5.4)
SODIUM BLD-SCNC: 138 MMOL/L (ref 136–145)
TOTAL PROTEIN: 7 G/DL (ref 6.6–8.7)
WBC # BLD: 5.4 K/UL (ref 4.8–10.8)

## 2020-11-27 PROCEDURE — 85025 COMPLETE CBC W/AUTO DIFF WBC: CPT

## 2020-11-27 PROCEDURE — 36415 COLL VENOUS BLD VENIPUNCTURE: CPT

## 2020-11-27 PROCEDURE — 99999 PR OFFICE/OUTPT VISIT,PROCEDURE ONLY: CPT | Performed by: EMERGENCY MEDICINE

## 2020-11-27 PROCEDURE — 99284 EMERGENCY DEPT VISIT MOD MDM: CPT

## 2020-11-27 PROCEDURE — 83735 ASSAY OF MAGNESIUM: CPT

## 2020-11-27 PROCEDURE — 80053 COMPREHEN METABOLIC PANEL: CPT

## 2020-11-27 ASSESSMENT — ENCOUNTER SYMPTOMS
SHORTNESS OF BREATH: 0
ABDOMINAL PAIN: 0
EYE PAIN: 0
DIARRHEA: 0
VOICE CHANGE: 0
COUGH: 0
VOMITING: 0
EYE REDNESS: 0
RHINORRHEA: 0

## 2020-11-27 NOTE — ED NOTES
Bed: 02-A  Expected date:   Expected time:   Means of arrival:   Comments:  EM[facility] [place] resp issue     Christine Camp RN  11/27/20 0761 INR not at goal. Medications, chart, and patient findings reviewed. See calendar for adjustments to dose and follow up plan.     New report of unknown compliance. This has not been an issue in the past but things may have changed. Follow closely

## 2020-11-28 NOTE — ED PROVIDER NOTES
[facility] EMERGENCY DEPT  EMERGENCY DEPARTMENT ENCOUNTER      Pt Name: Reba Ram  MRN: [id number]  [place] 1940  Date of evaluation: 11/27/2020  Provider: MD Emmanuel Vega       Chief Complaint   Patient presents with    Bradycardia     in the 46s at [address] [facility], pacemaker         HISTORY OF PRESENT ILLNESS   (Location/Symptom, Timing/Onset,Context/Setting, Quality, Duration, Modifying Factors, Severity)  Note limiting factors. Reba Ram is a 79 y.o. female who presents to the emergency department for evaluation of bradycardia. Patient has a history of SA node dysfunction and has a pacemaker and. States her heart rate was in the 50s and she believes was occasionally even lower than that. Patient states she had no symptoms during that time such as shortness of breath, dizziness, lightheadedness, or anything else but was sent here strictly because of her heart rate and concerned may be her pacemaker was not working properly. HPI    NursingNotes were reviewed. REVIEW OF SYSTEMS    (2-9 systems for level 4, 10 or more for level 5)     Review of Systems   Constitutional: Negative for fatigue and fever. HENT: Negative for congestion, rhinorrhea and voice change. Eyes: Negative for pain and redness. Respiratory: Negative for cough and shortness of breath. Cardiovascular: Negative for chest pain. Gastrointestinal: Negative for abdominal pain, diarrhea and vomiting. Endocrine: Negative. Genitourinary: Negative. Musculoskeletal: Negative for arthralgias and gait problem. Skin: Negative for rash and wound. Neurological: Negative for weakness and headaches. Hematological: Negative. Psychiatric/Behavioral: Negative. All other systems reviewed and are negative. A complete review of systems was performed and is negative except as noted above in the HPI.        PAST MEDICAL HISTORY     Past Medical History:   Diagnosis Date    CAD (coronary artery disease)  Depressive disorder     GERD (gastroesophageal reflux disease)     bleeding ulcer    Hemorrhage of gastrointestinal tract     Hyperlipidemia     Hypertension     Osteoarthrosis     Pacemaker     Retention of fluid     Sick sinus syndrome ([facility] [address]     Sinus node dysfunction (HCC)          SURGICAL HISTORY       Past Surgical History:   Procedure Laterality Date    APPENDECTOMY      BACK SURGERY      CHOLECYSTECTOMY      COLONOSCOPY      DILATATION, ESOPHAGUS      EYE SURGERY      HYSTERECTOMY      PACEMAKER PLACEMENT  11/27/2010    [employer]         CURRENT MEDICATIONS       Discharge Medication List as of 11/27/2020  7:39 PM      CONTINUE these medications which have NOT CHANGED    Details   atorvastatin (LIPITOR) 20 MG tablet Take 20 mg by mouth daily Historical Med      melatonin 5 MG TABS tablet Take 10 mg by mouth nightlyHistorical Med      bisacodyl (DULCOLAX) 5 MG EC tablet Take 5 mg by mouth daily as needed for ConstipationHistorical Med      guaiFENesin (MUCINEX) 600 MG extended release tablet Take 1,200 mg by mouth 2 times dailyHistorical Med      vitamin D 1000 units CAPS Take 1 capsule by mouth dailyHistorical Med      traZODone (DESYREL) 50 MG tablet Take 50 mg by mouth dailyHistorical Med      menthol-zinc oxide (CALMOSEPTINE) 0.44-20.625 % OINT ointment Apply topically daily Max 30 ml per day., Topical, DAILY, Historical Med      omeprazole (PRILOSEC) 20 MG delayed release capsule Take 20 mg by mouth dailyHistorical Med      magnesium citrate solution Take 296 mLs by mouth once as needed for ConstipationHistorical Med      tolnaftate (TINACTIN) 1 % cream Apply topically nightly , Topical, NIGHTLY, Historical Med      traMADol (ULTRAM ER) 300 MG extended releae tablet Take 300 mg by mouth daily. Historical Med      rOPINIRole (REQUIP) 0.5 MG tablet Take 0.5 mg by mouth nightly Historical Med      Multiple Vitamins-Minerals (THERAPEUTIC MULTIVITAMIN-MINERALS) tablet Take 1 tablet by Attends Holiness service: None     Active member of club or organization: None     Attends meetings of clubs or organizations: None     Relationship status: None    Intimate partner violence     Fear of current or ex partner: None     Emotionally abused: None     Physically abused: None     Forced sexual activity: None   Other Topics Concern    None   Social History Narrative    None       SCREENINGS    Herman Coma Scale  Eye Opening: Spontaneous  Best Verbal Response: Oriented  Best Motor Response: Obeys commands  Herman Coma Scale Score: 15        PHYSICAL EXAM    (up to 7 for level 4, 8 or more for level 5)     ED Triage Vitals [11/27/20 1641]   BP Temp Temp src Pulse Resp SpO2 Height Weight   101/72 97.8 °F (36.6 °C) -- 82 16 (!) 88 % 5' 8\" (1.727 m) 180 lb (81.6 kg)       Physical Exam  Vitals signs and nursing note reviewed. Constitutional:       General: She is not in acute distress. Appearance: She is well-developed. She is not diaphoretic. HENT:      Head: Normocephalic and atraumatic. Eyes:      General: No scleral icterus. Neck:      Vascular: No JVD. Cardiovascular:      Rate and Rhythm: Normal rate and regular rhythm. Pulses:           Radial pulses are 2+ on the right side and 2+ on the left side. Dorsalis pedis pulses are 2+ on the right side and 2+ on the left side. Heart sounds: Normal heart sounds. No murmur. No friction rub. No gallop. Pulmonary:      Effort: Pulmonary effort is normal. No accessory muscle usage or respiratory distress. Breath sounds: Normal breath sounds. No stridor. No decreased breath sounds, wheezing, rhonchi or rales. Chest:      Chest wall: No tenderness. Abdominal:      General: There is no distension. Palpations: Abdomen is soft. Tenderness: There is no abdominal tenderness. There is no guarding or rebound. Musculoskeletal: Normal range of motion. General: No deformity. Right lower leg: No edema. Left lower leg: No edema. Skin:     General: Skin is warm and dry. Coloration: Skin is not pale. Findings: No erythema. Neurological:      Mental Status: She is alert and oriented to person, place, and time. GCS: GCS eye subscore is 4. GCS verbal subscore is 5. GCS motor subscore is 6. Cranial Nerves: No cranial nerve deficit. Motor: No abnormal muscle tone. Coordination: Coordination normal.   Psychiatric:         Behavior: Behavior normal.         Judgment: Judgment normal.         DIAGNOSTIC RESULTS     EKG: All EKG's are interpreted by the Emergency Department Physician who either signs or Co-signs this chart in the absence of a cardiologist.        RADIOLOGY:   Non-plain film images such as CT, Ultrasound and MRI are read by the radiologist. Princess Speed images are visualized and preliminarily interpreted by the emergency physician with the below findings:        Interpretation per the Radiologist below, if available at the time of this note:    No orders to display         ED BEDSIDE ULTRASOUND:   Performed by ED Physician - none    LABS:  Labs Reviewed   CBC WITH AUTO DIFFERENTIAL - Abnormal; Notable for the following components:       Result Value    MCHC 30.5 (*)     RDW 15.2 (*)     Neutrophils % 73.8 (*)     Lymphocytes % 14.0 (*)     Lymphocytes Absolute 0.8 (*)     All other components within normal limits   COMPREHENSIVE METABOLIC PANEL W/ REFLEX TO MG FOR LOW K - Abnormal; Notable for the following components:    Potassium reflex Magnesium 3.4 (*)     Chloride 95 (*)     CO2 31 (*)     AST 43 (*)     All other components within normal limits   MAGNESIUM       All other labs were within normal range or not returned as of this dictation.     Medications - No data to display    [address] [place] [address] and DIFFERENTIALDIAGNOSIS/MDM:   Vitals:    Vitals:    11/27/20 1833 11/27/20 1903 11/27/20 1933 11/27/20 2003   BP: (!) 103/43 (!) 108/41 (!) 104/44 (!) 94/56 Pulse: 81 80 74 75   Resp: 15 16 14 15   Temp:       SpO2: 97% 96% 97% 97%   Weight:       Height:           MDM     EKG shows an atrial sensed ventricular paced rhythm there is otherwise unremarkable. Occasional PVCs. Laboratory evaluation here is unremarkable. Patient was monitored in the emergency department for several hours and had no bradycardic episodes or any significant arrhythmias. Device was interrogated which showed no malfunction and no triggered events. Does not seem to be any complication of the pacemaker or any other issues here. Patient is hemodynamically stable. Oxygen saturation was initially in the upper 80s but improved after patient was at rest in bed in which she had no complaints of shortness of breath. Evaluation and work-up here revealed no signs of emergent or life-threatening pathology that would necessitate admission for further work-up or management at this time. Patient is felt to be stable for discharge home with return precautions for worsening of the condition or development of new concerning symptoms. Patient was encouraged to follow-up with their primary care doctor in the appropriate timeframe. Necessary prescriptions and information have been provided for treatment at home. Patient voices understanding and agreement with the plan. CONSULTS:  None    PROCEDURES:  Unless otherwise notedbelow, none     Procedures      FINAL IMPRESSION     1. Artificial cardiac pacemaker    2.  SA node dysfunction [facility])          DISPOSITION/PLAN   DISPOSITION Decision To Discharge 11/27/2020 07:14:02 PM      PATIENT REFERRED TO:  Ariane Monzon EMERGENCY DEPT  [facility]  [phone]    If symptoms worsen    [facility] EMERGENCY DEPT  [facility]  [phone]    If symptoms worsen    [address]  [phone]      As needed      DISCHARGE MEDICATIONS:  Discharge Medication List as of 11/27/2020  7:39 PM             (Please note that portions of this note were completed with a voice recognition program.  Efforts were made to edit the dictations butoccasionally words are mis-transcribed.)    Ye Fairbanks MD (electronically signed)  AttendingEmerSiloam Springs Regional Hospitalcy Physician          Ye Fairbanks., MD  11/27/20 2174

## 2020-12-01 LAB
EKG P AXIS: NORMAL DEGREES
EKG P-R INTERVAL: NORMAL MS
EKG Q-T INTERVAL: 416 MS
EKG QRS DURATION: 130 MS
EKG QTC CALCULATION (BAZETT): 458 MS
EKG T AXIS: -21 DEGREES

## 2021-02-24 ENCOUNTER — TELEPHONE (OUTPATIENT)
Dept: CARDIOLOGY CLINIC | Age: 81
End: 2021-02-24

## 2021-02-25 DIAGNOSIS — I49.5 SSS (SICK SINUS SYNDROME) (HCC): Primary | ICD-10-CM

## 2021-02-25 DIAGNOSIS — Z95.0 PACEMAKER: ICD-10-CM

## 2021-02-25 PROCEDURE — 93294 REM INTERROG EVL PM/LDLS PM: CPT | Performed by: NURSE PRACTITIONER

## 2021-02-25 PROCEDURE — 93296 REM INTERROG EVL PM/IDS: CPT | Performed by: NURSE PRACTITIONER

## 2021-07-12 ENCOUNTER — OFFICE VISIT (OUTPATIENT)
Dept: CARDIOLOGY CLINIC | Age: 81
End: 2021-07-12
Payer: MEDICARE

## 2021-07-12 VITALS
WEIGHT: 175 LBS | BODY MASS INDEX: 26.52 KG/M2 | HEIGHT: 68 IN | DIASTOLIC BLOOD PRESSURE: 58 MMHG | SYSTOLIC BLOOD PRESSURE: 96 MMHG | HEART RATE: 66 BPM

## 2021-07-12 DIAGNOSIS — Z95.0 PACEMAKER: ICD-10-CM

## 2021-07-12 DIAGNOSIS — I49.5 SA NODE DYSFUNCTION (HCC): ICD-10-CM

## 2021-07-12 DIAGNOSIS — E78.2 MIXED HYPERLIPIDEMIA: Primary | ICD-10-CM

## 2021-07-12 PROCEDURE — 1090F PRES/ABSN URINE INCON ASSESS: CPT | Performed by: INTERNAL MEDICINE

## 2021-07-12 PROCEDURE — G8427 DOCREV CUR MEDS BY ELIG CLIN: HCPCS | Performed by: INTERNAL MEDICINE

## 2021-07-12 PROCEDURE — 93280 PM DEVICE PROGR EVAL DUAL: CPT | Performed by: INTERNAL MEDICINE

## 2021-07-12 PROCEDURE — 1036F TOBACCO NON-USER: CPT | Performed by: INTERNAL MEDICINE

## 2021-07-12 PROCEDURE — 4040F PNEUMOC VAC/ADMIN/RCVD: CPT | Performed by: INTERNAL MEDICINE

## 2021-07-12 PROCEDURE — 99213 OFFICE O/P EST LOW 20 MIN: CPT | Performed by: INTERNAL MEDICINE

## 2021-07-12 PROCEDURE — G8417 CALC BMI ABV UP PARAM F/U: HCPCS | Performed by: INTERNAL MEDICINE

## 2021-07-12 PROCEDURE — 1123F ACP DISCUSS/DSCN MKR DOCD: CPT | Performed by: INTERNAL MEDICINE

## 2021-07-12 PROCEDURE — G8400 PT W/DXA NO RESULTS DOC: HCPCS | Performed by: INTERNAL MEDICINE

## 2021-07-12 RX ORDER — GABAPENTIN 100 MG/1
100 CAPSULE ORAL 3 TIMES DAILY
COMMUNITY

## 2021-07-12 RX ORDER — BENZONATATE 100 MG/1
100 CAPSULE ORAL 3 TIMES DAILY PRN
COMMUNITY

## 2021-07-12 ASSESSMENT — ENCOUNTER SYMPTOMS
COUGH: 0
SHORTNESS OF BREATH: 1
ANAL BLEEDING: 0
BLOOD IN STOOL: 0

## 2021-07-12 NOTE — PROGRESS NOTES
Office Visit  Kathy Birmingham is a 79 y.o. female; who present today for Establish Cardiologist      HPI  I am seeing this 58-year-old white female in routine follow-up but is the first time I am seeing her as a new cardiologist.  This patient has a history of sick sinus syndrome and permanent pacemaker which was interrogated today, demonstrating normal function with no arrhythmias noted. She is wheelchair-bound because of back issues and is on oxygen 24 hours a day, apparently because of COPD according to her granddaughter who brought her to the office. The patient does not experience chest pain and her breathing is baseline. She denies episodes of palpitations and there has been no presyncope or syncope. Current Outpatient Medications   Medication Sig Dispense Refill    benzonatate (TESSALON) 100 MG capsule Take 100 mg by mouth 3 times daily as needed for Cough      gabapentin (NEURONTIN) 100 MG capsule Take 100 mg by mouth 3 times daily.  atorvastatin (LIPITOR) 20 MG tablet Take 20 mg by mouth daily       melatonin 5 MG TABS tablet Take 10 mg by mouth nightly      bisacodyl (DULCOLAX) 5 MG EC tablet Take 5 mg by mouth daily as needed for Constipation      guaiFENesin (MUCINEX) 600 MG extended release tablet Take 1,200 mg by mouth 2 times daily      vitamin D 1000 units CAPS Take 1 capsule by mouth daily      traZODone (DESYREL) 50 MG tablet Take 50 mg by mouth daily      menthol-zinc oxide (CALMOSEPTINE) 0.44-20.625 % OINT ointment Apply topically daily Max 30 ml per day.  omeprazole (PRILOSEC) 20 MG delayed release capsule Take 20 mg by mouth daily      magnesium citrate solution Take 296 mLs by mouth once as needed for Constipation      tolnaftate (TINACTIN) 1 % cream Apply topically nightly       traMADol (ULTRAM ER) 300 MG extended releae tablet Take 300 mg by mouth daily.       rOPINIRole (REQUIP) 0.5 MG tablet Take 0.5 mg by mouth nightly       Multiple Vitamins-Minerals (THERAPEUTIC MULTIVITAMIN-MINERALS) tablet Take 1 tablet by mouth daily      furosemide (LASIX) 40 MG tablet Take 40 mg by mouth 2 times daily       citalopram (CELEXA) 40 MG tablet Take 40 mg by mouth daily       traMADol (ULTRAM) 50 MG tablet Take 50 mg by mouth nightly       ipratropium (ATROVENT) 0.03 % nasal spray 2 sprays by Nasal route 3 times daily as needed       bacitracin 500 UNIT/GM ophthalmic ointment Place into the left eye nightly       acetaminophen (TYLENOL) 325 MG tablet Take 325 mg by mouth every 12 hours as needed for Pain       baclofen (LIORESAL) 10 MG tablet Take 10 mg by mouth 3 times daily as needed       docusate sodium (COLACE) 100 MG capsule Take 200 mg by mouth 2 times daily        No current facility-administered medications for this visit.       Medications Discontinued During This Encounter   Medication Reason    loratadine (CLARITIN) 10 MG tablet DISCONTINUED BY ANOTHER CLINICIAN        Allergies   Allergen Reactions    Morphine Sulfate [Morphine] Anaphylaxis     Pt states she quit breathing      Ciprocinonide [Fluocinolone]     Codeine Phosphate [Codeine] Nausea Only    Quinolones        Past Medical History:   Diagnosis Date    CAD (coronary artery disease)     Depressive disorder     GERD (gastroesophageal reflux disease)     bleeding ulcer    Hemorrhage of gastrointestinal tract     Hyperlipidemia     Osteoarthrosis     Pacemaker     Retention of fluid     Sick sinus syndrome (HCC)     Sinus node dysfunction (HCC)      Negative - Past Medical History for  Past Medical History Pertinent Negatives:   Diagnosis Date Noted    Cancer ([facility] [address] 10/12/2017    Cerebral artery occlusion with cerebral infarction ([facility] [address] 10/12/2017    Diabetes mellitus ([facility] [address] 10/12/2017    Other disorders of kidney and ureter in diseases classified elsewhere 10/12/2017       Past Surgical History:   Procedure Laterality Date    APPENDECTOMY      BACK SURGERY      CHOLECYSTECTOMY      COLONOSCOPY      DILATATION, ESOPHAGUS      EYE SURGERY      HYSTERECTOMY      PACEMAKER PLACEMENT  11/27/2010    [employer]     Social History     Occupational History    Not on file   Tobacco Use    Smoking status: Former Smoker     Types: Cigarettes    Smokeless tobacco: Never Used   Vaping Use    Vaping Use: Never used   Substance and Sexual Activity    Alcohol use: No    Drug use: No    Sexual activity: Not on file        Family History   Problem Relation Age of Onset    Heart Disease Mother     Heart Disease Father        Review of Systems  Review of Systems   Constitutional: Positive for fatigue. Negative for fever. Respiratory: Positive for shortness of breath. Negative for cough. Cardiovascular: Negative for chest pain, palpitations and leg swelling. Gastrointestinal: Negative for anal bleeding and blood in stool. Neurological: Negative for syncope, facial asymmetry and speech difficulty. Physical Exam  BP (!) 96/58   Pulse 66   Ht 5' 8\" (1.727 m)   Wt 175 lb (79.4 kg)   BMI 26.61 kg/m²    Physical Exam  Constitutional:       Appearance: Normal appearance. She is overweight. Cardiovascular:      Rate and Rhythm: Normal rate and regular rhythm. Heart sounds: Normal heart sounds. No gallop. Pulmonary:      Effort: Pulmonary effort is normal.      Comments: Lung aeration fair, diffuse rales, both lung fields, anterior and posterior  Abdominal:      General: Abdomen is flat. Bowel sounds are normal.      Palpations: Abdomen is soft. Musculoskeletal:         General: No swelling. Skin:     General: Skin is warm and dry. Neurological:      Mental Status: She is alert. Assessment/Plan    EKG Findings:  Not performed today, pacemaker was interrogated    Problem List Items Addressed This Visit        Cardiology Problems    SA node dysfunction (HCC)    Mixed hyperlipidemia - Primary       Other    Pacemaker           Diagnosis Orders   1.  Mixed hyperlipidemia 2. SA node dysfunction ([facility] [address]      Dual-chamber permanent pacemaker   3. Pacemaker         Recommendations:   Diet: Low-fat, calorie reduced  Activity: Wheelchair activities restricted  Medication Changes: No medication change    No orders of the defined types were placed in this encounter. No orders of the defined types were placed in this encounter. Return in about 6 months (around 1/12/2022) for APRN, routine.

## 2021-10-13 ENCOUNTER — TELEPHONE (OUTPATIENT)
Dept: CARDIOLOGY CLINIC | Age: 81
End: 2021-10-13

## 2021-11-23 ENCOUNTER — TELEPHONE (OUTPATIENT)
Dept: CARDIOLOGY CLINIC | Age: 81
End: 2021-11-23

## 2021-11-23 NOTE — TELEPHONE ENCOUNTER
Called Clovis Bruno at [facility] and reminded  to send [employer] remote pacemaker interrogation on patient. Tiffany Valadez

## 2021-11-29 DIAGNOSIS — Z95.0 PACEMAKER: Primary | ICD-10-CM

## 2021-11-29 DIAGNOSIS — I49.5 SA NODE DYSFUNCTION (HCC): ICD-10-CM

## 2021-11-29 PROCEDURE — 93294 REM INTERROG EVL PM/LDLS PM: CPT | Performed by: NURSE PRACTITIONER

## 2021-11-29 PROCEDURE — 93296 REM INTERROG EVL PM/IDS: CPT | Performed by: NURSE PRACTITIONER

## 2022-01-17 ENCOUNTER — TELEPHONE (OUTPATIENT)
Dept: CARDIOLOGY CLINIC | Age: 82
End: 2022-01-17

## 2022-01-18 ENCOUNTER — OFFICE VISIT (OUTPATIENT)
Dept: CARDIOLOGY CLINIC | Age: 82
End: 2022-01-18
Payer: MEDICARE

## 2022-01-18 VITALS
SYSTOLIC BLOOD PRESSURE: 130 MMHG | BODY MASS INDEX: 25.01 KG/M2 | HEART RATE: 60 BPM | WEIGHT: 165 LBS | HEIGHT: 68 IN | DIASTOLIC BLOOD PRESSURE: 64 MMHG

## 2022-01-18 DIAGNOSIS — I49.5 SA NODE DYSFUNCTION (HCC): Primary | ICD-10-CM

## 2022-01-18 DIAGNOSIS — Z95.0 PACEMAKER: ICD-10-CM

## 2022-01-18 DIAGNOSIS — E78.2 MIXED HYPERLIPIDEMIA: ICD-10-CM

## 2022-01-18 PROCEDURE — G8400 PT W/DXA NO RESULTS DOC: HCPCS | Performed by: CLINICAL NURSE SPECIALIST

## 2022-01-18 PROCEDURE — 93280 PM DEVICE PROGR EVAL DUAL: CPT | Performed by: CLINICAL NURSE SPECIALIST

## 2022-01-18 PROCEDURE — 4040F PNEUMOC VAC/ADMIN/RCVD: CPT | Performed by: CLINICAL NURSE SPECIALIST

## 2022-01-18 PROCEDURE — 1036F TOBACCO NON-USER: CPT | Performed by: CLINICAL NURSE SPECIALIST

## 2022-01-18 PROCEDURE — 1123F ACP DISCUSS/DSCN MKR DOCD: CPT | Performed by: CLINICAL NURSE SPECIALIST

## 2022-01-18 PROCEDURE — 1090F PRES/ABSN URINE INCON ASSESS: CPT | Performed by: CLINICAL NURSE SPECIALIST

## 2022-01-18 PROCEDURE — G8484 FLU IMMUNIZE NO ADMIN: HCPCS | Performed by: CLINICAL NURSE SPECIALIST

## 2022-01-18 PROCEDURE — G8427 DOCREV CUR MEDS BY ELIG CLIN: HCPCS | Performed by: CLINICAL NURSE SPECIALIST

## 2022-01-18 PROCEDURE — 99213 OFFICE O/P EST LOW 20 MIN: CPT | Performed by: CLINICAL NURSE SPECIALIST

## 2022-01-18 PROCEDURE — G8417 CALC BMI ABV UP PARAM F/U: HCPCS | Performed by: CLINICAL NURSE SPECIALIST

## 2022-01-18 NOTE — PATIENT INSTRUCTIONS
[facility]   Follow up in 6 mos With pacemaker check   Call with any questions or concerns  Follow up with Sonu Proctor MD for non cardiac problems  Report any new problems  Cardiovascular Fitness-Exercise as tolerated. Cardiac / Healthy Diet  Continue current medications as directed  Continue plan of treatment  It is always recommended that you bring your medications bottles with you to each visit - this is for your safety!

## 2022-01-18 NOTE — PROGRESS NOTES
[id number] [facility] Cardiology  [facility] [address]  [address]  Phone: [phone]  Fax: [phone]    OFFICE VISIT:  2022    Belinda Saldivar - : 1940    Reason For Visit:  Delma Santoro is a 79 y.o. female who is here for 6 Month Follow-Up, Irregular Heart Beat (pacemaker), and Hyperlipidemia  Pacemaker placed in  for sick sinus syndrome. Chronically ill with COPD in wheelchair residing at [facility]  Here today for routine follow-up and pacemaker interrogation brought by her family member. Denies any cardiac symptoms. States she wears her oxygen all the time even though it bothers her. She is not sure why she has to wear it all the time. She states they checked her oxygen level twice a day and it is always good    Subjective  Delma Santoro denies exertional chest pain, shortness of breath, orthopnea, paroxysmal nocturnal dyspnea, syncope, presyncope, arrhythmia, edema and fatigue. The patient denies numbness or weakness to suggest cerebrovascular accident or transient ischemic attack. Chema Benoit MD is PCP and follows labs .   Belinda Saldivar has the following history as recorded in [facility]:    Patient Active Problem List    Diagnosis Date Noted    Mixed hyperlipidemia 2021    Scalp cyst 2019    Other osteomyelitis, other site ([address] 2018    Severe protein-calorie malnutrition ([address]     Decubitus ulcer of sacral region, stage 4 ([address] 10/12/2017    Debilitated 10/12/2017    Malnutrition ([address] 10/12/2017    H/O back injury 10/12/2017    Incontinence of feces 10/12/2017    SA node dysfunction ([address] 2017    Pacemaker     Diastolic dysfunction      Past Medical History:   Diagnosis Date    CAD (coronary artery disease)     Depressive disorder     GERD (gastroesophageal reflux disease)     bleeding ulcer    Hemorrhage of gastrointestinal tract     Hyperlipidemia     Osteoarthrosis     Pacemaker     Retention of fluid  Sick sinus syndrome (HCC)     Sinus node dysfunction (HCC)      Past Surgical History:   Procedure Laterality Date    APPENDECTOMY      BACK SURGERY      CHOLECYSTECTOMY      COLONOSCOPY      DILATATION, ESOPHAGUS      EYE SURGERY      HYSTERECTOMY      PACEMAKER PLACEMENT  11/27/2010    [employer]     Family History   Problem Relation Age of Onset    Heart Disease Mother     Heart Disease Father      Social History     Tobacco Use    Smoking status: Former Smoker     Types: Cigarettes    Smokeless tobacco: Never Used   Substance Use Topics    Alcohol use: No      Current Outpatient Medications   Medication Sig Dispense Refill    benzonatate (TESSALON) 100 MG capsule Take 100 mg by mouth 3 times daily as needed for Cough      gabapentin (NEURONTIN) 100 MG capsule Take 100 mg by mouth 3 times daily.  atorvastatin (LIPITOR) 20 MG tablet Take 20 mg by mouth daily       melatonin 5 MG TABS tablet Take 10 mg by mouth nightly      bisacodyl (DULCOLAX) 5 MG EC tablet Take 5 mg by mouth daily as needed for Constipation      guaiFENesin (MUCINEX) 600 MG extended release tablet Take 1,200 mg by mouth 2 times daily      vitamin D 1000 units CAPS Take 1 capsule by mouth daily      traZODone (DESYREL) 50 MG tablet Take 50 mg by mouth daily      menthol-zinc oxide (CALMOSEPTINE) 0.44-20.625 % OINT ointment Apply topically daily Max 30 ml per day.  omeprazole (PRILOSEC) 20 MG delayed release capsule Take 20 mg by mouth daily      tolnaftate (TINACTIN) 1 % cream Apply topically nightly       traMADol (ULTRAM ER) 300 MG extended releae tablet Take 300 mg by mouth daily.       rOPINIRole (REQUIP) 0.5 MG tablet Take 0.5 mg by mouth nightly       Multiple Vitamins-Minerals (THERAPEUTIC MULTIVITAMIN-MINERALS) tablet Take 1 tablet by mouth daily      furosemide (LASIX) 40 MG tablet Take 40 mg by mouth 2 times daily       citalopram (CELEXA) 40 MG tablet Take 40 mg by mouth daily       traMADol (ULTRAM) 50 MG tablet Take 50 mg by mouth nightly       ipratropium (ATROVENT) 0.03 % nasal spray 2 sprays by Nasal route 3 times daily as needed       bacitracin 500 UNIT/GM ophthalmic ointment Place into the left eye nightly       acetaminophen (TYLENOL) 325 MG tablet Take 325 mg by mouth every 12 hours as needed for Pain       baclofen (LIORESAL) 10 MG tablet Take 10 mg by mouth 3 times daily as needed       docusate sodium (COLACE) 100 MG capsule Take 200 mg by mouth 2 times daily       magnesium citrate solution Take 296 mLs by mouth once as needed for Constipation (Patient not taking: Reported on 1/18/2022)       No current facility-administered medications for this visit. Allergies: Morphine sulfate [morphine], Ciprocinonide [fluocinolone], Codeine phosphate [codeine], and Quinolones    Review of Systems  Constitutional - no significant activity change, appetite change, or unexpected weight change. No fever, chills or diaphoresis. No fatigue. HEENT - no significant rhinorrhea or epistaxis. No tinnitus or significant hearing loss. Eyes - no sudden vision change or amaurosis. Respiratory - no significant wheezing, stridor, apnea or cough. No dyspnea on exertion or shortness of breath. Cardiovascular - no exertional chest pain, orthopnea or PND. No sensation of arrhythmia or slow heart rate. No claudication or leg edema. Gastrointestinal - no abdominal swelling or pain. No blood in stool. No severe constipation, diarrhea, nausea, or vomiting. Genitourinary - no difficulty urinating, dysuria, frequency, or urgency. No flank pain or hematuria. Musculoskeletal - + does not walk in wheelchair or bed. Skin - no color change or rash. No pallor. No new surgical incision. Neurologic - no speech difficulty, facial asymmetry or lateralizing weakness. No seizures, presyncope, syncope, or significant dizziness. Hematologic - no easy bruising or excessive bleeding.    Psychiatric - no severe anxiety or insomnia. No confusion. All other review of systems are negative. Objective  Vital Signs - /64   Pulse 60   Ht 5' 8\" (1.727 m)   Wt 165 lb (74.8 kg)   BMI 25.09 kg/m²   General - Mariela Scales is alert, cooperative, and pleasant. Well groomed. No acute distress. Body habitus is overweight. HEENT - The head is normocephalic. No circumoral cyanosis. Dentition is normal.   EYES -  No Xanthelasma, no arcus senilis, no conjunctival hemorrhages or discharge. Neck - Supple, without increased jugular venous pressures. No carotid bruits. No mass. Respiratory - Lungs are clear bilaterally. No wheezes or rales. Normal effort without use of accessory muscles. Cardiovascular - Heart has regular rhythm and rate. No murmurs, rubs or gallops. + pedal pulses and no varicosities. Abdominal -  Soft, nontender, nondistended. Bowel sounds are intact. Extremities - No clubbing, cyanosis, or  edema. Musculoskeletal -  No clubbing . No Osler's nodes. + In wheelchair. No kyphosis or scoliosis. Skin -  no statis ulcers or dermatitis. Neurological - No focal signs are identified. Oriented to person, place and time. Psychiatric -  Appropriate affect and mood. Assessment:     Diagnosis Orders   1. SA node dysfunction ([address]     2. Pacemaker     3. Mixed hyperlipidemia       Data:  BP Readings from Last 3 Encounters:   01/18/22 130/64   07/12/21 (!) 96/58   11/27/20 (!) 94/56    Pulse Readings from Last 3 Encounters:   01/18/22 60   07/12/21 66   11/27/20 75        Wt Readings from Last 3 Encounters:   01/18/22 165 lb (74.8 kg)   07/12/21 175 lb (79.4 kg)   11/27/20 180 lb (81.6 kg)   Pacemaker check showed adequate battery status- approximately 29 months   and  appropriate diagnostics without any sustained arrhythmias.    Mode AAIR-DDDR at 60  AS-VS 45.4%  AP- VS 53.6%  AS- 0.7%  AP-  0.3%  Next check in 3 months via [employer] home monitoring system      Blood pressure and heart rate controlled. Medical management includes diuretic and statin. Takes no antihypertensives  PCP manages labs  Suggest she talk with her primary care need for continue oxygen-on 1 L per nasal cannula     States taking medications as prescribed  Stable cardiovascular status. No evidence of overt heart failure, angina or dysrhythmia. 20minutes were spent preparing, reviewing and seeing patient. All questions answered    Plan  Send [facility] home pacemaker reading on 4-20-22   Follow up in 6 mos With pacemaker check   Call with any questions or concerns  Follow up with Kaden Hoffman MD for non cardiac problems  Report any new problems  Cardiovascular Fitness-Exercise as tolerated. Cardiac / Healthy Diet  Continue current medications as directed  Continue plan of treatment  It is always recommended that you bring your medications bottles with you to each visit - this is for your safety! CAMILLE Pérez    EMR dragon/transcription disclaimer: Much of this encounter note is electronic transcription/translation of spoken language to printed tach. Electronic translation of spoken language may be erroneous, or at times, nonsensical words or phrases may be inadvertently transcribed.  Although, I have reviewed the note for such errors, some may still exist.

## 2022-04-21 ENCOUNTER — TELEPHONE (OUTPATIENT)
Dept: CARDIOLOGY CLINIC | Age: 82
End: 2022-04-21

## 2022-06-08 ENCOUNTER — TELEPHONE (OUTPATIENT)
Dept: CARDIOLOGY CLINIC | Age: 82
End: 2022-06-08

## 2022-07-07 ENCOUNTER — TELEPHONE (OUTPATIENT)
Dept: CARDIOLOGY CLINIC | Age: 82
End: 2022-07-07

## 2022-07-18 ENCOUNTER — TELEPHONE (OUTPATIENT)
Dept: CARDIOLOGY CLINIC | Age: 82
End: 2022-07-18

## 2022-07-18 NOTE — TELEPHONE ENCOUNTER
Called and was unable to leave VM with patient to confirm 7/19 @ 1:45 appt due to phone being disconnected, BW 7/18

## 2022-08-03 ENCOUNTER — TELEPHONE (OUTPATIENT)
Dept: CARDIOLOGY CLINIC | Age: 82
End: 2022-08-03

## 2022-08-03 NOTE — TELEPHONE ENCOUNTER
I spoke with [employer] to advise it was time to send in a carelink from patient's pacemaker. She stated she would let the nurse know.

## 2022-08-16 ENCOUNTER — OFFICE VISIT (OUTPATIENT)
Dept: CARDIOLOGY CLINIC | Age: 82
End: 2022-08-16
Payer: COMMERCIAL

## 2022-08-16 VITALS
BODY MASS INDEX: 25.31 KG/M2 | HEIGHT: 68 IN | HEART RATE: 64 BPM | WEIGHT: 167 LBS | SYSTOLIC BLOOD PRESSURE: 120 MMHG | DIASTOLIC BLOOD PRESSURE: 74 MMHG

## 2022-08-16 DIAGNOSIS — I49.5 SA NODE DYSFUNCTION (HCC): Primary | ICD-10-CM

## 2022-08-16 DIAGNOSIS — I49.5 SSS (SICK SINUS SYNDROME) (HCC): ICD-10-CM

## 2022-08-16 DIAGNOSIS — E78.2 MIXED HYPERLIPIDEMIA: ICD-10-CM

## 2022-08-16 DIAGNOSIS — Z95.0 PACEMAKER: ICD-10-CM

## 2022-08-16 PROCEDURE — G8427 DOCREV CUR MEDS BY ELIG CLIN: HCPCS | Performed by: CLINICAL NURSE SPECIALIST

## 2022-08-16 PROCEDURE — 1036F TOBACCO NON-USER: CPT | Performed by: CLINICAL NURSE SPECIALIST

## 2022-08-16 PROCEDURE — 93280 PM DEVICE PROGR EVAL DUAL: CPT | Performed by: CLINICAL NURSE SPECIALIST

## 2022-08-16 PROCEDURE — 99213 OFFICE O/P EST LOW 20 MIN: CPT | Performed by: CLINICAL NURSE SPECIALIST

## 2022-08-16 PROCEDURE — G8400 PT W/DXA NO RESULTS DOC: HCPCS | Performed by: CLINICAL NURSE SPECIALIST

## 2022-08-16 PROCEDURE — 1123F ACP DISCUSS/DSCN MKR DOCD: CPT | Performed by: CLINICAL NURSE SPECIALIST

## 2022-08-16 PROCEDURE — G8417 CALC BMI ABV UP PARAM F/U: HCPCS | Performed by: CLINICAL NURSE SPECIALIST

## 2022-08-16 PROCEDURE — 1090F PRES/ABSN URINE INCON ASSESS: CPT | Performed by: CLINICAL NURSE SPECIALIST

## 2022-08-16 NOTE — PATIENT INSTRUCTIONS
Send [facility] home pacemaker reading on 11-16-22     Follow up in 6 mos  With pacemaker   Call with any questions or concerns  Follow up with Paz Ha MD for non cardiac problems  Report any new problems  Cardiovascular Fitness-Exercise as tolerated. Cardiac / Healthy Diet  Continue current medications as directed  Continue plan of treatment  It is always recommended that you bring your medications bottles with you to each visit - this is for your safety!

## 2022-08-16 NOTE — PROGRESS NOTES
[facility]. Cardiology  [facility] [address]  [address]  Phone: [phone]  Fax: [phone]    OFFICE VISIT:  2022    Willy Sage - : 1940    Reason For Visit:  Ralph Rai is a 79 y.o. female who is here for 6 Month Follow-Up (Pacemaker no cardiac symptoms) and Hyperlipidemia  Pacemaker placed in  for sick sinus syndrome. Chronically ill with COPD in wheelchair [facility]  Here today for routine follow-up and pacemaker interrogation. She is accompanied by a nurse from the nursing home where she lives, [facility]    She states she does not walk and stays in her wheelchair most of the time. She has not noticed any cardiac symptoms. Wears her oxygen all the time but states she does not really have any shortness of breath    Subjective  Ralph Rai denies exertional chest pain, shortness of breath, orthopnea, paroxysmal nocturnal dyspnea, syncope, presyncope, arrhythmia, edema and fatigue. The patient denies numbness or weakness to suggest cerebrovascular accident or transient ischemic attack.     Erika Solomon MD is PCP and follows labs   Willy Sage has the following history as recorded in [facility]:    Patient Active Problem List    Diagnosis Date Noted    Mixed hyperlipidemia 2021    Scalp cyst 2019    Other osteomyelitis, other site ([address] 2018    Severe protein-calorie malnutrition ([address]     Decubitus ulcer of sacral region, stage 4 ([address] 10/12/2017    Debilitated 10/12/2017    Malnutrition ([address] 10/12/2017    H/O back injury 10/12/2017    Incontinence of feces 10/12/2017    SA node dysfunction ([address] 2017    Pacemaker     Diastolic dysfunction      Past Medical History:   Diagnosis Date    CAD (coronary artery disease)     Depressive disorder     GERD (gastroesophageal reflux disease)     bleeding ulcer    Hemorrhage of gastrointestinal tract     Hyperlipidemia     Osteoarthrosis Pacemaker     Retention of fluid     Sick sinus syndrome [address]     Sinus node dysfunction (HCC)      Past Surgical History:   Procedure Laterality Date    APPENDECTOMY      BACK SURGERY      CHOLECYSTECTOMY      COLONOSCOPY      DILATATION, ESOPHAGUS      EYE SURGERY      HYSTERECTOMY (CERVIX STATUS UNKNOWN)      PACEMAKER PLACEMENT  11/27/2010    [employer]     Family History   Problem Relation Age of Onset    Heart Disease Mother     Heart Disease Father      Social History     Tobacco Use    Smoking status: Former     Types: Cigarettes    Smokeless tobacco: Never   Substance Use Topics    Alcohol use: No      Current Outpatient Medications   Medication Sig Dispense Refill    benzonatate (TESSALON) 100 MG capsule Take 100 mg by mouth 3 times daily as needed for Cough      gabapentin (NEURONTIN) 100 MG capsule Take 100 mg by mouth 3 times daily. atorvastatin (LIPITOR) 20 MG tablet Take 20 mg by mouth daily       bisacodyl (DULCOLAX) 5 MG EC tablet Take 5 mg by mouth daily as needed for Constipation      guaiFENesin (MUCINEX) 600 MG extended release tablet Take 1,200 mg by mouth 2 times daily      vitamin D 1000 units CAPS Take 1 capsule by mouth daily      traZODone (DESYREL) 50 MG tablet Take 50 mg by mouth daily      menthol-zinc oxide (CALMOSEPTINE) 0.44-20.625 % OINT ointment Apply topically daily Max 30 ml per day. omeprazole (PRILOSEC) 20 MG delayed release capsule Take 20 mg by mouth daily      tolnaftate (TINACTIN) 1 % cream Apply topically nightly       traMADol (ULTRAM ER) 300 MG extended releae tablet Take 300 mg by mouth daily.       rOPINIRole (REQUIP) 0.5 MG tablet Take 0.5 mg by mouth nightly       Multiple Vitamins-Minerals (THERAPEUTIC MULTIVITAMIN-MINERALS) tablet Take 1 tablet by mouth daily      furosemide (LASIX) 40 MG tablet Take 40 mg by mouth 2 times daily       citalopram (CELEXA) 40 MG tablet Take 40 mg by mouth daily       traMADol (ULTRAM) 50 MG tablet Take 50 mg by mouth nightly ipratropium (ATROVENT) 0.03 % nasal spray 2 sprays by Nasal route 3 times daily as needed       bacitracin 500 UNIT/GM ophthalmic ointment Place into the left eye nightly       acetaminophen (TYLENOL) 325 MG tablet Take 325 mg by mouth every 12 hours as needed for Pain       baclofen (LIORESAL) 10 MG tablet Take 10 mg by mouth 3 times daily as needed       docusate sodium (COLACE) 100 MG capsule Take 200 mg by mouth 2 times daily       melatonin 5 MG TABS tablet Take 10 mg by mouth nightly (Patient not taking: Reported on 8/16/2022)      magnesium citrate solution Take 296 mLs by mouth once as needed for Constipation (Patient not taking: Reported on 8/16/2022)       No current facility-administered medications for this visit. Allergies: Morphine sulfate [morphine], Ciprocinonide [fluocinolone], Codeine phosphate [codeine], and Quinolones    Review of Systems  Constitutional - no significant activity change, appetite change, or unexpected weight change. No fever, chills or diaphoresis. No fatigue. HEENT - no significant rhinorrhea or epistaxis. No tinnitus or significant hearing loss. Eyes - no sudden vision change or amaurosis. Respiratory - no significant wheezing, stridor, apnea or cough. No dyspnea on exertion or shortness of breath. Cardiovascular - no exertional chest pain, orthopnea or PND. No sensation of arrhythmia or slow heart rate. No claudication or leg edema. Gastrointestinal - no abdominal swelling or pain. No blood in stool. No severe constipation, diarrhea, nausea, or vomiting. Genitourinary - no difficulty urinating, dysuria, frequency, or urgency. No flank pain or hematuria. Musculoskeletal -unable to walk, uses wheelchair. Skin - no color change or rash. No pallor. No new surgical incision. Reports previous decubitus have healed  Neurologic - no speech difficulty, facial asymmetry or lateralizing weakness.   No seizures, presyncope, syncope, or significant dizziness. Hematologic - no easy bruising or excessive bleeding. Psychiatric - no severe anxiety or insomnia. No confusion. All other review of systems are negative. Objective  Vital Signs - /74   Pulse 64   Ht 5' 8\" (1.727 m)   Wt 167 lb (75.8 kg)   BMI 25.39 kg/m²   General - Velvet Smiling is alert, cooperative, and pleasant. Well groomed. No acute distress. Body habitus is abdominal obesity. HEENT - The head is normocephalic. No circumoral cyanosis. Dentition is normal.   EYES -  No Xanthelasma, no arcus senilis, no conjunctival hemorrhages or discharge. Neck - Supple, without increased jugular venous pressures. No carotid bruits. No mass. Respiratory - Lungs are clear bilaterally. No wheezes or rales. Normal effort without use of accessory muscles. Cardiovascular - Heart has regular rhythm and rate. No murmurs, rubs or gallops. + pedal pulses and no varicosities. Abdominal -  Soft, nontender, nondistended. Bowel sounds are intact. Extremities - No clubbing, cyanosis, or  edema. Musculoskeletal -  No clubbing . No Osler's nodes. Gait-in wheelchair. No kyphosis or scoliosis. Skin -  no statis ulcers or dermatitis. Neurological - No focal signs are identified. Oriented to person, place and time. Psychiatric -  Appropriate affect and mood. Assessment:     Diagnosis Orders   1. SA node dysfunction ([address]        2. Pacemaker        3. SSS (sick sinus syndrome) ([address]        4. Mixed hyperlipidemia          Data:  BP Readings from Last 3 Encounters:   08/16/22 120/74   01/18/22 130/64   07/12/21 (!) 96/58    Pulse Readings from Last 3 Encounters:   08/16/22 64   01/18/22 60   07/12/21 66        Wt Readings from Last 3 Encounters:   08/16/22 167 lb (75.8 kg)   01/18/22 165 lb (74.8 kg)   07/12/21 175 lb (79.4 kg)   Pacemaker check showed adequate battery status- approximately 23 months   and  appropriate diagnostics without any sustained arrhythmias.    Frequent PVCs  Mode AAIR-DDDR at 60  AS-VS 36.8%  AP- VS 62.7%  AS- 0.3%  AP-  0.2%  Next check in 3 months via [facility] home monitoring system      Blood pressure and heart rate well controlled. Medical management includes multiple pain medications. She is on statin but no antihypertensives    PCP managing labs   States taking medications as prescribed  Stable cardiovascular status. No evidence of overt heart failure, angina or dysrhythmia. 20 minutes were spent preparing, reviewing and seeing patient. All questions answered    Plan    Send [facility] home pacemaker reading on 11-16-22     Follow up in 6 mos  With pacemaker   Call with any questions or concerns  Follow up with Jhon Winters MD for non cardiac problems  Report any new problems  Cardiovascular Fitness-Exercise as tolerated. Cardiac / Healthy Diet  Continue current medications as directed  Continue plan of treatment  It is always recommended that you bring your medications bottles with you to each visit - this is for your safety! CAMILLE Massey    EMR dragon/transcription disclaimer: Much of this encounter note is electronic transcription/translation of spoken language to printed tach. Electronic translation of spoken language may be erroneous, or at times, nonsensical words or phrases may be inadvertently transcribed.  Although, I have reviewed the note for such errors, some may still exist.

## 2022-11-17 ENCOUNTER — TELEPHONE (OUTPATIENT)
Dept: CARDIOLOGY CLINIC | Age: 82
End: 2022-11-17

## 2022-11-17 PROCEDURE — 93294 REM INTERROG EVL PM/LDLS PM: CPT | Performed by: CLINICAL NURSE SPECIALIST

## 2022-11-17 PROCEDURE — 93296 REM INTERROG EVL PM/IDS: CPT | Performed by: CLINICAL NURSE SPECIALIST

## 2022-11-18 DIAGNOSIS — Z95.0 PACEMAKER: Primary | ICD-10-CM

## 2022-11-18 DIAGNOSIS — I49.5 SA NODE DYSFUNCTION (HCC): ICD-10-CM

## 2023-02-22 ENCOUNTER — OFFICE VISIT (OUTPATIENT)
Dept: CARDIOLOGY CLINIC | Age: 83
End: 2023-02-22
Payer: COMMERCIAL

## 2023-02-22 VITALS
HEIGHT: 68 IN | DIASTOLIC BLOOD PRESSURE: 68 MMHG | WEIGHT: 165 LBS | HEART RATE: 74 BPM | BODY MASS INDEX: 25.01 KG/M2 | SYSTOLIC BLOOD PRESSURE: 124 MMHG

## 2023-02-22 DIAGNOSIS — I49.5 SSS (SICK SINUS SYNDROME) (HCC): ICD-10-CM

## 2023-02-22 DIAGNOSIS — I49.5 SA NODE DYSFUNCTION (HCC): Primary | ICD-10-CM

## 2023-02-22 DIAGNOSIS — Z95.0 PACEMAKER: ICD-10-CM

## 2023-02-22 PROCEDURE — 99213 OFFICE O/P EST LOW 20 MIN: CPT | Performed by: CLINICAL NURSE SPECIALIST

## 2023-02-22 PROCEDURE — 93280 PM DEVICE PROGR EVAL DUAL: CPT | Performed by: CLINICAL NURSE SPECIALIST

## 2023-02-22 PROCEDURE — 1123F ACP DISCUSS/DSCN MKR DOCD: CPT | Performed by: CLINICAL NURSE SPECIALIST

## 2023-02-22 RX ORDER — FLUOXETINE 10 MG/1
10 TABLET, FILM COATED ORAL DAILY
COMMUNITY

## 2023-02-22 NOTE — PATIENT INSTRUCTIONS
Home pacemaker reading in 3 months-we will contact nursing home  5/26/2023      Follow up in 6 months pacemaker interrogation  Call with any questions or concerns  Follow up with Lee Ann Malik MD for non cardiac problems  Report any new problems  Cardiovascular Fitness-Exercise as tolerated. Cardiac / Healthy Diet- Avoid processed high fat foods, maintain low sodium/salt   Continue current medications as directed  Continue plan of treatment  It is always recommended that you bring your medications bottles with you to each visit - this is for your safety!

## 2023-02-22 NOTE — PROGRESS NOTES
[address] [place], [address]  [address]  Phone: [phone]  Fax: [phone]    OFFICE VISIT:  2023    Robert Jennings - : 1940    Reason For Visit:  Keith Velásquez is a 79 y.o. female who is here for 6 Month Follow-Up (No cardiac symptoms), Irregular Heart Beat (Pacemaker  ), and Hyperlipidemia  Pacemaker placed in  for sick sinus syndrome. Chronically ill with COPD in wheelchair residing at [facility]  Here today for routine follow-up and pacemaker interrogation. States overall she is doing fairly well. She still continues to do a lot of crocheting and is making a baby blanket for her newest great granddaughter. She states she does not walk and stays in her wheelchair most of the time. She has not noticed any cardiac symptoms. Wears her oxygen all the time but states she does not really have any shortness of breath    Subjective  Keith Velásquez denies exertional chest pain, shortness of breath, orthopnea, paroxysmal nocturnal dyspnea, syncope, presyncope, arrhythmia, edema and fatigue. The patient denies numbness or weakness to suggest cerebrovascular accident or transient ischemic attack. Rodrigue Aguilar MD is PCP and follows labs .   Robert Jennings has the following history as recorded in [facility]:    Patient Active Problem List    Diagnosis Date Noted    Mixed hyperlipidemia 2021    Scalp cyst 2019    Other osteomyelitis, other site ([address] 2018    Severe protein-calorie malnutrition ([address]     Decubitus ulcer of sacral region, stage 4 ([address] 10/12/2017    Debilitated 10/12/2017    Malnutrition ([address] 10/12/2017    H/O back injury 10/12/2017    Incontinence of feces 10/12/2017    SA node dysfunction ([address] 2017    Pacemaker     Diastolic dysfunction      Past Medical History:   Diagnosis Date    CAD (coronary artery disease)     Depressive disorder     GERD (gastroesophageal reflux disease)     bleeding ulcer Hemorrhage of gastrointestinal tract     Hyperlipidemia     Osteoarthrosis     Pacemaker     Retention of fluid     Sick sinus syndrome (HCC)     Sinus node dysfunction (HCC)      Past Surgical History:   Procedure Laterality Date    APPENDECTOMY      BACK SURGERY      CHOLECYSTECTOMY      COLONOSCOPY      DILATATION, ESOPHAGUS      EYE SURGERY      HYSTERECTOMY (CERVIX STATUS UNKNOWN)      PACEMAKER PLACEMENT  11/27/2010    [employer]     Family History   Problem Relation Age of Onset    Heart Disease Mother     Heart Disease Father      Social History     Tobacco Use    Smoking status: Former     Types: Cigarettes    Smokeless tobacco: Never   Substance Use Topics    Alcohol use: No      Current Outpatient Medications   Medication Sig Dispense Refill    FLUoxetine (PROZAC) 10 MG tablet Take 10 mg by mouth daily      benzonatate (TESSALON) 100 MG capsule Take 100 mg by mouth 3 times daily as needed for Cough      gabapentin (NEURONTIN) 100 MG capsule Take 100 mg by mouth 3 times daily. atorvastatin (LIPITOR) 20 MG tablet Take 20 mg by mouth daily       bisacodyl (DULCOLAX) 5 MG EC tablet Take 5 mg by mouth daily as needed for Constipation      guaiFENesin (MUCINEX) 600 MG extended release tablet Take 1,200 mg by mouth 2 times daily      vitamin D 1000 units CAPS Take 1 capsule by mouth daily      traZODone (DESYREL) 50 MG tablet Take 50 mg by mouth daily      menthol-zinc oxide (CALMOSEPTINE) 0.44-20.625 % OINT ointment Apply topically daily Max 30 ml per day. omeprazole (PRILOSEC) 20 MG delayed release capsule Take 20 mg by mouth daily      tolnaftate (TINACTIN) 1 % cream Apply topically nightly       traMADol (ULTRAM ER) 300 MG extended releae tablet Take 300 mg by mouth daily.       rOPINIRole (REQUIP) 0.5 MG tablet Take 0.5 mg by mouth nightly       Multiple Vitamins-Minerals (THERAPEUTIC MULTIVITAMIN-MINERALS) tablet Take 1 tablet by mouth daily      furosemide (LASIX) 40 MG tablet Take 40 mg by mouth 2 times daily       citalopram (CELEXA) 40 MG tablet Take 40 mg by mouth daily       ipratropium (ATROVENT) 0.03 % nasal spray 2 sprays by Nasal route 3 times daily as needed       bacitracin 500 UNIT/GM ophthalmic ointment Place into the left eye nightly       acetaminophen (TYLENOL) 325 MG tablet Take 325 mg by mouth every 12 hours as needed for Pain       baclofen (LIORESAL) 10 MG tablet Take 10 mg by mouth 3 times daily as needed       docusate sodium (COLACE) 100 MG capsule Take 200 mg by mouth 2 times daily       traMADol (ULTRAM) 50 MG tablet Take 50 mg by mouth nightly  (Patient not taking: Reported on 2/22/2023)       No current facility-administered medications for this visit. Allergies: Morphine sulfate [morphine], Ciprocinonide [fluocinolone], Codeine phosphate [codeine], and Quinolones    Review of Systems  Constitutional - no significant activity change, appetite change, or unexpected weight change. No fever, chills or diaphoresis. No fatigue. HEENT - no significant rhinorrhea or epistaxis. No tinnitus or significant hearing loss. Eyes - no sudden vision change or amaurosis. Respiratory - no significant wheezing, stridor, apnea or cough. No dyspnea on exertion or shortness of breath. Cardiovascular - no exertional chest pain, orthopnea or PND. No sensation of arrhythmia or slow heart rate. No claudication or leg edema. Gastrointestinal - no abdominal swelling or pain. No blood in stool. No severe constipation, diarrhea, nausea, or vomiting. Genitourinary - no difficulty urinating, dysuria, frequency, or urgency. No flank pain or hematuria. Musculoskeletal - no back pain, gait -does not walk-- in wheelchair  Skin - no color change or rash. No pallor. No new surgical incision. Neurologic - no speech difficulty, facial asymmetry or lateralizing weakness. No seizures, presyncope, syncope, or significant dizziness. Hematologic - no easy bruising or excessive bleeding. Psychiatric - no severe anxiety or insomnia. No confusion. All other review of systems are negative. Objective  Vital Signs - /68   Pulse 74   Ht 5' 8\" (1.727 m)   Wt 165 lb (74.8 kg)   BMI 25.09 kg/m²   General - Marilu Conde is alert, cooperative, and pleasant. Well groomed. No acute distress. Body habitus is overweight. HEENT - The head is normocephalic. No circumoral cyanosis. Dentition is normal.   EYES -  No Xanthelasma, no arcus senilis, no conjunctival hemorrhages or discharge. Neck - Supple, without increased jugular venous pressures. No carotid bruits. No mass. Respiratory - Lungs are clear bilaterally. No wheezes or rales. Normal effort without use of accessory muscles. Cardiovascular - Heart has regular rhythm and rate. No murmurs, rubs or gallops. + pedal pulses and no varicosities. Abdominal -  Soft, nontender, nondistended. Bowel sounds are intact. Extremities - No clubbing, cyanosis, or  edema. Musculoskeletal -  No clubbing . No Osler's nodes. Gait-in wheelchair. No kyphosis or scoliosis. Skin -  no statis ulcers or dermatitis. Neurological - No focal signs are identified. Oriented to person, place and time. Psychiatric -  Appropriate affect and mood. Assessment:     Diagnosis Orders   1. SA node dysfunction ([address]        2. Pacemaker        3. SSS (sick sinus syndrome) ([facility])          Data:  BP Readings from Last 3 Encounters:   02/22/23 124/68   08/16/22 120/74   01/18/22 130/64    Pulse Readings from Last 3 Encounters:   02/22/23 74   08/16/22 64   01/18/22 60        Wt Readings from Last 3 Encounters:   02/22/23 165 lb (74.8 kg)   08/16/22 167 lb (75.8 kg)   01/18/22 165 lb (74.8 kg)   Pacemaker check showed adequate battery status- approximately 19 months   and  appropriate diagnostics without any sustained arrhythmias.    Mode AAIR-DDDR at 60  AS-VS 49.9%  AP- VS 47.2%  AS- 2.5%  AP-  0.3%  Next check in 3 months [facility] monitoring system      Blood pressure heart rate controlled. Medical management includes no antihypertensives. PCP manages labs. On statin         States taking medications as prescribed  Stable cardiovascular status. No evidence of overt heart failure, angina or dysrhythmia. 20 minutes were spent preparing, reviewing and seeing patient. All questions answered    Plan  Home pacemaker reading in 3 months-we will contact nursing home  5/26/2023      Follow up in 6 months pacemaker interrogation  Call with any questions or concerns  Follow up with Zara Gerber MD for non cardiac problems  Report any new problems  Cardiovascular Fitness-Exercise as tolerated. Cardiac / Healthy Diet- Avoid processed high fat foods, maintain low sodium/salt   Continue current medications as directed  Continue plan of treatment  It is always recommended that you bring your medications bottles with you to each visit - this is for your safety! CAMILLE Harrington    EMR dragon/transcription disclaimer: Much of this encounter note is electronic transcription/translation of spoken language to printed tach. Electronic translation of spoken language may be erroneous, or at times, nonsensical words or phrases may be inadvertently transcribed.  Although, I have reviewed the note for such errors, some may still exist.

## 2023-05-26 PROCEDURE — 93296 REM INTERROG EVL PM/IDS: CPT | Performed by: CLINICAL NURSE SPECIALIST

## 2023-05-26 PROCEDURE — 93294 REM INTERROG EVL PM/LDLS PM: CPT | Performed by: CLINICAL NURSE SPECIALIST

## 2023-05-30 DIAGNOSIS — Z95.0 PACEMAKER: Primary | ICD-10-CM

## 2023-05-30 DIAGNOSIS — I49.5 SA NODE DYSFUNCTION (HCC): ICD-10-CM

## 2023-07-03 ENCOUNTER — HOSPITAL ENCOUNTER (INPATIENT)
Age: 83
LOS: 1 days | Discharge: NURSING, REHAB OR LONG-TERM CARE | DRG: 641 | End: 2023-07-05
Attending: FAMILY MEDICINE | Admitting: FAMILY MEDICINE
Payer: MEDICARE

## 2023-07-03 DIAGNOSIS — E87.6 HYPOKALEMIA: Primary | ICD-10-CM

## 2023-07-03 LAB
ALBUMIN SERPL-MCNC: 2.4 G/DL (ref 3.5–5.2)
ALP SERPL-CCNC: 107 U/L (ref 35–104)
ALT SERPL-CCNC: 36 U/L (ref 5–33)
ANION GAP SERPL CALCULATED.3IONS-SCNC: 15 MMOL/L (ref 7–19)
AST SERPL-CCNC: 39 U/L (ref 5–32)
BASOPHILS # BLD: 0 K/UL (ref 0–0.2)
BASOPHILS NFR BLD: 0.3 % (ref 0–1)
BILIRUB SERPL-MCNC: 0.5 MG/DL (ref 0.2–1.2)
BUN SERPL-MCNC: 23 MG/DL (ref 8–23)
CALCIUM SERPL-MCNC: 8.1 MG/DL (ref 8.8–10.2)
CHLORIDE SERPL-SCNC: 83 MMOL/L (ref 98–111)
CO2 SERPL-SCNC: 35 MMOL/L (ref 22–29)
CREAT SERPL-MCNC: 0.7 MG/DL (ref 0.5–0.9)
EOSINOPHIL # BLD: 0 K/UL (ref 0–0.6)
EOSINOPHIL NFR BLD: 0 % (ref 0–5)
ERYTHROCYTE [DISTWIDTH] IN BLOOD BY AUTOMATED COUNT: 12.9 % (ref 11.5–14.5)
GLUCOSE SERPL-MCNC: 90 MG/DL (ref 74–109)
HCT VFR BLD AUTO: 42.1 % (ref 37–47)
HGB BLD-MCNC: 13.6 G/DL (ref 12–16)
IMM GRANULOCYTES # BLD: 0 K/UL
LYMPHOCYTES # BLD: 1 K/UL (ref 1.1–4.5)
LYMPHOCYTES NFR BLD: 9.6 % (ref 20–40)
MCH RBC QN AUTO: 32.7 PG (ref 27–31)
MCHC RBC AUTO-ENTMCNC: 32.3 G/DL (ref 33–37)
MCV RBC AUTO: 101.2 FL (ref 81–99)
MONOCYTES # BLD: 0.8 K/UL (ref 0–0.9)
MONOCYTES NFR BLD: 7.7 % (ref 0–10)
NEUTROPHILS # BLD: 8.3 K/UL (ref 1.5–7.5)
NEUTS SEG NFR BLD: 82 % (ref 50–65)
PLATELET # BLD AUTO: 196 K/UL (ref 130–400)
PMV BLD AUTO: 11.2 FL (ref 9.4–12.3)
POTASSIUM SERPL-SCNC: 1.9 MMOL/L (ref 3.5–5)
PROT SERPL-MCNC: 5.3 G/DL (ref 6.6–8.7)
RBC # BLD AUTO: 4.16 M/UL (ref 4.2–5.4)
REASON FOR REJECTION: NORMAL
REJECTED TEST: NORMAL
SODIUM SERPL-SCNC: 133 MMOL/L (ref 136–145)
WBC # BLD AUTO: 10.1 K/UL (ref 4.8–10.8)

## 2023-07-03 PROCEDURE — 99285 EMERGENCY DEPT VISIT HI MDM: CPT

## 2023-07-03 PROCEDURE — 85025 COMPLETE CBC W/AUTO DIFF WBC: CPT

## 2023-07-03 PROCEDURE — 36415 COLL VENOUS BLD VENIPUNCTURE: CPT

## 2023-07-03 PROCEDURE — 6370000000 HC RX 637 (ALT 250 FOR IP): Performed by: NURSE PRACTITIONER

## 2023-07-03 PROCEDURE — G0378 HOSPITAL OBSERVATION PER HR: HCPCS

## 2023-07-03 PROCEDURE — 51702 INSERT TEMP BLADDER CATH: CPT

## 2023-07-03 PROCEDURE — 93005 ELECTROCARDIOGRAM TRACING: CPT | Performed by: NURSE PRACTITIONER

## 2023-07-03 PROCEDURE — 80053 COMPREHEN METABOLIC PANEL: CPT

## 2023-07-03 PROCEDURE — 6360000002 HC RX W HCPCS: Performed by: NURSE PRACTITIONER

## 2023-07-03 RX ORDER — POTASSIUM CHLORIDE 7.45 MG/ML
10 INJECTION INTRAVENOUS ONCE
Status: COMPLETED | OUTPATIENT
Start: 2023-07-03 | End: 2023-07-03

## 2023-07-03 RX ORDER — SODIUM CHLORIDE 0.9 % (FLUSH) 0.9 %
5-40 SYRINGE (ML) INJECTION EVERY 12 HOURS SCHEDULED
Status: DISCONTINUED | OUTPATIENT
Start: 2023-07-03 | End: 2023-07-04 | Stop reason: SDUPTHER

## 2023-07-03 RX ORDER — ONDANSETRON 2 MG/ML
4 INJECTION INTRAMUSCULAR; INTRAVENOUS ONCE
Status: DISCONTINUED | OUTPATIENT
Start: 2023-07-03 | End: 2023-07-05 | Stop reason: HOSPADM

## 2023-07-03 RX ORDER — ACETAMINOPHEN 325 MG/1
650 TABLET ORAL EVERY 4 HOURS PRN
Status: DISCONTINUED | OUTPATIENT
Start: 2023-07-03 | End: 2023-07-04

## 2023-07-03 RX ORDER — ENOXAPARIN SODIUM 100 MG/ML
40 INJECTION SUBCUTANEOUS DAILY
Status: DISCONTINUED | OUTPATIENT
Start: 2023-07-04 | End: 2023-07-04

## 2023-07-03 RX ORDER — SODIUM CHLORIDE 9 MG/ML
INJECTION, SOLUTION INTRAVENOUS PRN
Status: DISCONTINUED | OUTPATIENT
Start: 2023-07-03 | End: 2023-07-04 | Stop reason: SDUPTHER

## 2023-07-03 RX ORDER — ONDANSETRON 2 MG/ML
4 INJECTION INTRAMUSCULAR; INTRAVENOUS EVERY 6 HOURS PRN
Status: DISCONTINUED | OUTPATIENT
Start: 2023-07-03 | End: 2023-07-04

## 2023-07-03 RX ORDER — ONDANSETRON 4 MG/1
4 TABLET, ORALLY DISINTEGRATING ORAL EVERY 8 HOURS PRN
Status: DISCONTINUED | OUTPATIENT
Start: 2023-07-03 | End: 2023-07-04

## 2023-07-03 RX ORDER — MIRTAZAPINE 15 MG/1
7.5 TABLET, FILM COATED ORAL NIGHTLY
COMMUNITY

## 2023-07-03 RX ORDER — SODIUM CHLORIDE 0.9 % (FLUSH) 0.9 %
5-40 SYRINGE (ML) INJECTION PRN
Status: DISCONTINUED | OUTPATIENT
Start: 2023-07-03 | End: 2023-07-04 | Stop reason: SDUPTHER

## 2023-07-03 RX ADMIN — POTASSIUM BICARBONATE 40 MEQ: 782 TABLET, EFFERVESCENT ORAL at 19:48

## 2023-07-03 RX ADMIN — POTASSIUM CHLORIDE 10 MEQ: 7.46 INJECTION, SOLUTION INTRAVENOUS at 22:39

## 2023-07-04 LAB
ANION GAP SERPL CALCULATED.3IONS-SCNC: 9 MMOL/L (ref 7–19)
BASOPHILS # BLD: 0 K/UL (ref 0–0.2)
BASOPHILS NFR BLD: 0.2 % (ref 0–1)
BUN SERPL-MCNC: 22 MG/DL (ref 8–23)
CALCIUM SERPL-MCNC: 8.8 MG/DL (ref 8.8–10.2)
CHLORIDE SERPL-SCNC: 85 MMOL/L (ref 98–111)
CO2 SERPL-SCNC: 41 MMOL/L (ref 22–29)
CREAT SERPL-MCNC: 0.5 MG/DL (ref 0.5–0.9)
EOSINOPHIL # BLD: 0 K/UL (ref 0–0.6)
EOSINOPHIL NFR BLD: 0.2 % (ref 0–5)
ERYTHROCYTE [DISTWIDTH] IN BLOOD BY AUTOMATED COUNT: 12.5 % (ref 11.5–14.5)
GLUCOSE SERPL-MCNC: 102 MG/DL (ref 74–109)
HCT VFR BLD AUTO: 36.3 % (ref 37–47)
HGB BLD-MCNC: 11.9 G/DL (ref 12–16)
IMM GRANULOCYTES # BLD: 0 K/UL
LYMPHOCYTES # BLD: 1 K/UL (ref 1.1–4.5)
LYMPHOCYTES NFR BLD: 12 % (ref 20–40)
MAGNESIUM SERPL-MCNC: 2.2 MG/DL (ref 1.6–2.4)
MCH RBC QN AUTO: 32.6 PG (ref 27–31)
MCHC RBC AUTO-ENTMCNC: 32.8 G/DL (ref 33–37)
MCV RBC AUTO: 99.5 FL (ref 81–99)
MONOCYTES # BLD: 1 K/UL (ref 0–0.9)
MONOCYTES NFR BLD: 11.6 % (ref 0–10)
NEUTROPHILS # BLD: 6.2 K/UL (ref 1.5–7.5)
NEUTS SEG NFR BLD: 75.6 % (ref 50–65)
PLATELET # BLD AUTO: 179 K/UL (ref 130–400)
PMV BLD AUTO: 11.2 FL (ref 9.4–12.3)
POTASSIUM SERPL-SCNC: 2.4 MMOL/L (ref 3.5–5)
POTASSIUM SERPL-SCNC: 3.2 MMOL/L (ref 3.5–5)
RBC # BLD AUTO: 3.65 M/UL (ref 4.2–5.4)
SODIUM SERPL-SCNC: 135 MMOL/L (ref 136–145)
WBC # BLD AUTO: 8.3 K/UL (ref 4.8–10.8)

## 2023-07-04 PROCEDURE — 36415 COLL VENOUS BLD VENIPUNCTURE: CPT

## 2023-07-04 PROCEDURE — 6360000002 HC RX W HCPCS: Performed by: FAMILY MEDICINE

## 2023-07-04 PROCEDURE — 2580000003 HC RX 258: Performed by: FAMILY MEDICINE

## 2023-07-04 PROCEDURE — 85025 COMPLETE CBC W/AUTO DIFF WBC: CPT

## 2023-07-04 PROCEDURE — 6370000000 HC RX 637 (ALT 250 FOR IP): Performed by: FAMILY MEDICINE

## 2023-07-04 PROCEDURE — 96372 THER/PROPH/DIAG INJ SC/IM: CPT

## 2023-07-04 PROCEDURE — 6370000000 HC RX 637 (ALT 250 FOR IP): Performed by: PHYSICIAN ASSISTANT

## 2023-07-04 PROCEDURE — 83735 ASSAY OF MAGNESIUM: CPT

## 2023-07-04 PROCEDURE — 1210000000 HC MED SURG R&B

## 2023-07-04 PROCEDURE — 84132 ASSAY OF SERUM POTASSIUM: CPT

## 2023-07-04 PROCEDURE — 80048 BASIC METABOLIC PNL TOTAL CA: CPT

## 2023-07-04 RX ORDER — SODIUM CHLORIDE 0.9 % (FLUSH) 0.9 %
5-40 SYRINGE (ML) INJECTION PRN
Status: DISCONTINUED | OUTPATIENT
Start: 2023-07-04 | End: 2023-07-05 | Stop reason: HOSPADM

## 2023-07-04 RX ORDER — POTASSIUM CHLORIDE 20 MEQ/1
40 TABLET, EXTENDED RELEASE ORAL PRN
Status: DISCONTINUED | OUTPATIENT
Start: 2023-07-04 | End: 2023-07-05 | Stop reason: HOSPADM

## 2023-07-04 RX ORDER — ACETAMINOPHEN 500 MG
500 TABLET ORAL EVERY 6 HOURS PRN
Status: DISCONTINUED | OUTPATIENT
Start: 2023-07-04 | End: 2023-07-04 | Stop reason: SDUPTHER

## 2023-07-04 RX ORDER — FUROSEMIDE 20 MG/1
20 TABLET ORAL 2 TIMES DAILY
Status: DISCONTINUED | OUTPATIENT
Start: 2023-07-04 | End: 2023-07-04

## 2023-07-04 RX ORDER — ONDANSETRON 4 MG/1
4 TABLET, ORALLY DISINTEGRATING ORAL EVERY 8 HOURS PRN
Status: DISCONTINUED | OUTPATIENT
Start: 2023-07-04 | End: 2023-07-05 | Stop reason: HOSPADM

## 2023-07-04 RX ORDER — MIRTAZAPINE 7.5 MG/1
7.5 TABLET, FILM COATED ORAL NIGHTLY
Status: DISCONTINUED | OUTPATIENT
Start: 2023-07-04 | End: 2023-07-04

## 2023-07-04 RX ORDER — ACETAMINOPHEN 500 MG
500 TABLET ORAL EVERY 6 HOURS PRN
Status: ON HOLD | COMMUNITY
End: 2023-07-05 | Stop reason: HOSPADM

## 2023-07-04 RX ORDER — ONDANSETRON 2 MG/ML
4 INJECTION INTRAMUSCULAR; INTRAVENOUS EVERY 6 HOURS PRN
Status: DISCONTINUED | OUTPATIENT
Start: 2023-07-04 | End: 2023-07-05 | Stop reason: HOSPADM

## 2023-07-04 RX ORDER — MAGNESIUM SULFATE IN WATER 40 MG/ML
2000 INJECTION, SOLUTION INTRAVENOUS PRN
Status: DISCONTINUED | OUTPATIENT
Start: 2023-07-04 | End: 2023-07-05 | Stop reason: HOSPADM

## 2023-07-04 RX ORDER — POTASSIUM CHLORIDE 7.45 MG/ML
10 INJECTION INTRAVENOUS PRN
Status: DISCONTINUED | OUTPATIENT
Start: 2023-07-04 | End: 2023-07-05 | Stop reason: HOSPADM

## 2023-07-04 RX ORDER — POTASSIUM CHLORIDE 20 MEQ/1
40 TABLET, EXTENDED RELEASE ORAL ONCE
Status: COMPLETED | OUTPATIENT
Start: 2023-07-04 | End: 2023-07-04

## 2023-07-04 RX ORDER — ENOXAPARIN SODIUM 100 MG/ML
40 INJECTION SUBCUTANEOUS DAILY
Status: DISCONTINUED | OUTPATIENT
Start: 2023-07-05 | End: 2023-07-05 | Stop reason: HOSPADM

## 2023-07-04 RX ORDER — POLYETHYLENE GLYCOL 3350 17 G/17G
17 POWDER, FOR SOLUTION ORAL DAILY PRN
Status: DISCONTINUED | OUTPATIENT
Start: 2023-07-04 | End: 2023-07-05 | Stop reason: HOSPADM

## 2023-07-04 RX ORDER — SODIUM CHLORIDE 9 MG/ML
INJECTION, SOLUTION INTRAVENOUS PRN
Status: DISCONTINUED | OUTPATIENT
Start: 2023-07-04 | End: 2023-07-05 | Stop reason: HOSPADM

## 2023-07-04 RX ORDER — ATORVASTATIN CALCIUM 40 MG/1
40 TABLET, FILM COATED ORAL DAILY
Status: DISCONTINUED | OUTPATIENT
Start: 2023-07-04 | End: 2023-07-05 | Stop reason: HOSPADM

## 2023-07-04 RX ORDER — ACETAMINOPHEN 650 MG/1
650 SUPPOSITORY RECTAL EVERY 6 HOURS PRN
Status: DISCONTINUED | OUTPATIENT
Start: 2023-07-04 | End: 2023-07-04 | Stop reason: SDUPTHER

## 2023-07-04 RX ORDER — GABAPENTIN 100 MG/1
200 CAPSULE ORAL 2 TIMES DAILY
Status: DISCONTINUED | OUTPATIENT
Start: 2023-07-04 | End: 2023-07-05 | Stop reason: HOSPADM

## 2023-07-04 RX ORDER — SODIUM CHLORIDE 0.9 % (FLUSH) 0.9 %
5-40 SYRINGE (ML) INJECTION EVERY 12 HOURS SCHEDULED
Status: DISCONTINUED | OUTPATIENT
Start: 2023-07-04 | End: 2023-07-05 | Stop reason: HOSPADM

## 2023-07-04 RX ORDER — ACETAMINOPHEN 325 MG/1
650 TABLET ORAL EVERY 6 HOURS PRN
Status: DISCONTINUED | OUTPATIENT
Start: 2023-07-04 | End: 2023-07-04 | Stop reason: SDUPTHER

## 2023-07-04 RX ORDER — PANTOPRAZOLE SODIUM 40 MG/1
40 TABLET, DELAYED RELEASE ORAL
Status: DISCONTINUED | OUTPATIENT
Start: 2023-07-04 | End: 2023-07-05 | Stop reason: HOSPADM

## 2023-07-04 RX ORDER — VALACYCLOVIR HYDROCHLORIDE 500 MG/1
500 TABLET, FILM COATED ORAL 2 TIMES DAILY
Status: ON HOLD | COMMUNITY
End: 2023-07-05 | Stop reason: HOSPADM

## 2023-07-04 RX ORDER — DOCUSATE SODIUM 100 MG/1
200 CAPSULE, LIQUID FILLED ORAL 2 TIMES DAILY
Status: DISCONTINUED | OUTPATIENT
Start: 2023-07-04 | End: 2023-07-05 | Stop reason: HOSPADM

## 2023-07-04 RX ORDER — ONDANSETRON HYDROCHLORIDE 8 MG/1
8 TABLET, FILM COATED ORAL EVERY 8 HOURS PRN
Status: ON HOLD | COMMUNITY
End: 2023-07-05 | Stop reason: HOSPADM

## 2023-07-04 RX ORDER — ROPINIROLE 0.5 MG/1
0.5 TABLET, FILM COATED ORAL NIGHTLY
Status: DISCONTINUED | OUTPATIENT
Start: 2023-07-04 | End: 2023-07-05 | Stop reason: HOSPADM

## 2023-07-04 RX ORDER — TRAZODONE HYDROCHLORIDE 50 MG/1
50 TABLET ORAL NIGHTLY
Status: DISCONTINUED | OUTPATIENT
Start: 2023-07-04 | End: 2023-07-05 | Stop reason: HOSPADM

## 2023-07-04 RX ORDER — IPRATROPIUM BROMIDE 21 UG/1
2 SPRAY, METERED NASAL 3 TIMES DAILY PRN
Status: DISCONTINUED | OUTPATIENT
Start: 2023-07-04 | End: 2023-07-04

## 2023-07-04 RX ORDER — ACETAMINOPHEN 325 MG/1
325 TABLET ORAL EVERY 4 HOURS PRN
Status: DISCONTINUED | OUTPATIENT
Start: 2023-07-04 | End: 2023-07-05 | Stop reason: HOSPADM

## 2023-07-04 RX ORDER — IPRATROPIUM BROMIDE 21 UG/1
2 SPRAY, METERED NASAL 3 TIMES DAILY PRN
Status: DISCONTINUED | OUTPATIENT
Start: 2023-07-04 | End: 2023-07-05 | Stop reason: HOSPADM

## 2023-07-04 RX ORDER — POTASSIUM CHLORIDE 20 MEQ/1
20 TABLET, EXTENDED RELEASE ORAL 2 TIMES DAILY
Status: DISCONTINUED | OUTPATIENT
Start: 2023-07-04 | End: 2023-07-05 | Stop reason: HOSPADM

## 2023-07-04 RX ORDER — MAGNESIUM CITRATE
150 SOLUTION, ORAL ORAL PRN
Status: ON HOLD | COMMUNITY
End: 2023-07-05 | Stop reason: HOSPADM

## 2023-07-04 RX ORDER — GUAIFENESIN 600 MG/1
1200 TABLET, EXTENDED RELEASE ORAL 2 TIMES DAILY
Status: DISCONTINUED | OUTPATIENT
Start: 2023-07-04 | End: 2023-07-05 | Stop reason: HOSPADM

## 2023-07-04 RX ADMIN — TRAZODONE HYDROCHLORIDE 50 MG: 50 TABLET ORAL at 20:00

## 2023-07-04 RX ADMIN — SODIUM CHLORIDE, PRESERVATIVE FREE 10 ML: 5 INJECTION INTRAVENOUS at 01:26

## 2023-07-04 RX ADMIN — POTASSIUM CHLORIDE 20 MEQ: 1500 TABLET, EXTENDED RELEASE ORAL at 20:00

## 2023-07-04 RX ADMIN — GABAPENTIN 200 MG: 100 CAPSULE ORAL at 09:08

## 2023-07-04 RX ADMIN — GABAPENTIN 200 MG: 100 CAPSULE ORAL at 20:00

## 2023-07-04 RX ADMIN — POTASSIUM CHLORIDE 40 MEQ: 1500 TABLET, EXTENDED RELEASE ORAL at 17:24

## 2023-07-04 RX ADMIN — SODIUM CHLORIDE, PRESERVATIVE FREE 10 ML: 5 INJECTION INTRAVENOUS at 20:00

## 2023-07-04 RX ADMIN — POTASSIUM CHLORIDE 40 MEQ: 1500 TABLET, EXTENDED RELEASE ORAL at 12:08

## 2023-07-04 RX ADMIN — GUAIFENESIN 1200 MG: 600 TABLET ORAL at 20:00

## 2023-07-04 RX ADMIN — ROPINIROLE HYDROCHLORIDE 0.5 MG: 0.5 TABLET, FILM COATED ORAL at 20:00

## 2023-07-04 RX ADMIN — PANTOPRAZOLE SODIUM 40 MG: 40 TABLET, DELAYED RELEASE ORAL at 09:08

## 2023-07-04 RX ADMIN — GUAIFENESIN 1200 MG: 600 TABLET ORAL at 09:08

## 2023-07-04 RX ADMIN — ATORVASTATIN CALCIUM 40 MG: 40 TABLET, FILM COATED ORAL at 10:58

## 2023-07-04 RX ADMIN — SODIUM CHLORIDE, PRESERVATIVE FREE 10 ML: 5 INJECTION INTRAVENOUS at 10:58

## 2023-07-04 RX ADMIN — ENOXAPARIN SODIUM 40 MG: 100 INJECTION SUBCUTANEOUS at 09:08

## 2023-07-04 RX ADMIN — DOCUSATE SODIUM 200 MG: 100 CAPSULE, LIQUID FILLED ORAL at 09:08

## 2023-07-04 RX ADMIN — DOCUSATE SODIUM 200 MG: 100 CAPSULE, LIQUID FILLED ORAL at 20:00

## 2023-07-04 RX ADMIN — SODIUM CHLORIDE, PRESERVATIVE FREE 10 ML: 5 INJECTION INTRAVENOUS at 09:09

## 2023-07-04 RX ADMIN — POTASSIUM CHLORIDE 20 MEQ: 1500 TABLET, EXTENDED RELEASE ORAL at 09:08

## 2023-07-04 RX ADMIN — FUROSEMIDE 20 MG: 20 TABLET ORAL at 09:08

## 2023-07-04 NOTE — ED PROVIDER NOTES
Axillary   SpO2: 100% 96% 97% 98%   Weight:       Height:               MDM  Number of Diagnoses or Management Options  Hypokalemia: new and requires workup  Diagnosis management comments: Spoke to Dr Sharon Augustin for admission       Amount and/or Complexity of Data Reviewed  Clinical lab tests: ordered and reviewed               CONSULTS:  None    PROCEDURES:  Unless otherwise noted below, none     Procedures    FINAL IMPRESSION      1. Hypokalemia        DISPOSITION/PLAN   DISPOSITION Decision To Admit 07/03/2023 09:30:50 PM      PATIENT REFERRED TO:  No follow-up provider specified.     DISCHARGE MEDICATIONS:  Current Discharge Medication List             (Please note that portions of this note were completed with a voice recognitionprogram.  Efforts were made to edit the dictations but occasionally words are mis-transcribed.)    CAMILLE Moore (electronically signed)          CAMILLE Moore  07/04/23 0030

## 2023-07-04 NOTE — PLAN OF CARE
Problem: Discharge Planning  Goal: Discharge to home or other facility with appropriate resources  7/4/2023 1317 by Shaye Woodall RN  Outcome: Progressing  7/4/2023 0645 by Nicholas Hernandez RN  Outcome: Progressing  7/4/2023 0643 by Nicholas Hernandez RN  Outcome: Progressing  Flowsheets  Taken 7/4/2023 0031  Discharge to home or other facility with appropriate resources: Identify barriers to discharge with patient and caregiver  Taken 7/3/2023 2300  Discharge to home or other facility with appropriate resources: Identify barriers to discharge with patient and caregiver     Problem: Safety - Adult  Goal: Free from fall injury  7/4/2023 1317 by Shaye Woodall RN  Outcome: Progressing  7/4/2023 0645 by Nicholas Hernandez RN  Outcome: Progressing  7/4/2023 0643 by Nicholas Hernandez RN  Outcome: Progressing     Problem: ABCDS Injury Assessment  Goal: Absence of physical injury  7/4/2023 1317 by Shaye Woodall RN  Outcome: Progressing  7/4/2023 0645 by Nicholas Hernandez RN  Outcome: Progressing  7/4/2023 0643 by Nicholas Hernandez RN  Outcome: Progressing     Problem: Skin/Tissue Integrity  Goal: Absence of new skin breakdown  Description: 1. Monitor for areas of redness and/or skin breakdown  2. Assess vascular access sites hourly  3. Every 4-6 hours minimum:  Change oxygen saturation probe site  4. Every 4-6 hours:  If on nasal continuous positive airway pressure, respiratory therapy assess nares and determine need for appliance change or resting period.   7/4/2023 1317 by Shaye Woodall RN  Outcome: Progressing  7/4/2023 0645 by Nicholas Hernandez RN  Outcome: Progressing  7/4/2023 0643 by Nicholas Hernandez RN  Outcome: Progressing

## 2023-07-04 NOTE — PROGRESS NOTES
4 Eyes Skin Assessment    Clari Trinidad is being assessed upon: Admission    I agree that Katya Pollock RN, along with Abi Ramos RN (either 2 RN's or 1 LPN and 1 RN) have performed a thorough Head to Toe Skin Assessment on the patient. ALL assessment sites listed below have been assessed. Areas assessed by both nurses:     [x]   Head, Face, and Ears   [x]   Shoulders, Back, and Chest  [x]   Arms, Elbows, and Hands   [x]   Coccyx, Sacrum, and Ischium  [x]   Legs, Feet, and Heels    Does the Patient have Skin Breakdown? No    Patient has small tears scattered all over body, does not appear to be pressure related.       Hardy Prevention initiated: Yes  Wound Care Orders initiated: No    WOC nurse consulted for Pressure Injury (Stage 3,4, Unstageable, DTI, NWPT, and Complex wounds) and New or Established Ostomies: No        Primary Nurse eSignature: Roselyn Daigle RN on 7/4/2023 at 5:57 AM      Co-Signer eSignature: {Esignature:[id number]}

## 2023-07-04 NOTE — PLAN OF CARE
Problem: Discharge Planning  Goal: Discharge to home or other facility with appropriate resources  7/4/2023 0645 by Cathie Felton RN  Outcome: Progressing  7/4/2023 0643 by Cathie Felton RN  Outcome: Progressing  Flowsheets  Taken 7/4/2023 0031  Discharge to home or other facility with appropriate resources: Identify barriers to discharge with patient and caregiver  Taken 7/3/2023 2300  Discharge to home or other facility with appropriate resources: Identify barriers to discharge with patient and caregiver     Problem: Safety - Adult  Goal: Free from fall injury  7/4/2023 0645 by Cathie Felton RN  Outcome: Progressing  7/4/2023 0643 by Cathie Felton RN  Outcome: Progressing     Problem: ABCDS Injury Assessment  Goal: Absence of physical injury  7/4/2023 0645 by Cathie Felton RN  Outcome: Progressing  7/4/2023 0643 by Cathie Felton RN  Outcome: Progressing     Problem: Skin/Tissue Integrity  Goal: Absence of new skin breakdown  Description: 1. Monitor for areas of redness and/or skin breakdown  2. Assess vascular access sites hourly  3. Every 4-6 hours minimum:  Change oxygen saturation probe site  4. Every 4-6 hours:  If on nasal continuous positive airway pressure, respiratory therapy assess nares and determine need for appliance change or resting period.   7/4/2023 0645 by Cathie Felton RN  Outcome: Progressing  7/4/2023 0643 by Cathie Felton RN  Outcome: Progressing

## 2023-07-04 NOTE — PLAN OF CARE
Problem: Discharge Planning  Goal: Discharge to home or other facility with appropriate resources  Outcome: Progressing  Flowsheets  Taken 7/4/2023 0031  Discharge to home or other facility with appropriate resources: Identify barriers to discharge with patient and caregiver  Taken 7/3/2023 2300  Discharge to home or other facility with appropriate resources: Identify barriers to discharge with patient and caregiver     Problem: Safety - Adult  Goal: Free from fall injury  Outcome: Progressing     Problem: ABCDS Injury Assessment  Goal: Absence of physical injury  Outcome: Progressing     Problem: Skin/Tissue Integrity  Goal: Absence of new skin breakdown  Description: 1. Monitor for areas of redness and/or skin breakdown  2. Assess vascular access sites hourly  3. Every 4-6 hours minimum:  Change oxygen saturation probe site  4. Every 4-6 hours:  If on nasal continuous positive airway pressure, respiratory therapy assess nares and determine need for appliance change or resting period.   Outcome: Progressing

## 2023-07-04 NOTE — ED NOTES
[id number] Called Dr. Mane Seay answering service she is to call us back.      Sebastien Granger  07/03/23 2147

## 2023-07-05 VITALS
BODY MASS INDEX: 25.01 KG/M2 | SYSTOLIC BLOOD PRESSURE: 101 MMHG | HEIGHT: 68 IN | OXYGEN SATURATION: 93 % | RESPIRATION RATE: 16 BRPM | WEIGHT: 165 LBS | DIASTOLIC BLOOD PRESSURE: 54 MMHG | TEMPERATURE: 97.5 F | HEART RATE: 90 BPM

## 2023-07-05 LAB
ALBUMIN SERPL-MCNC: 2.1 G/DL (ref 3.5–5.2)
ALP SERPL-CCNC: 96 U/L (ref 35–104)
ALT SERPL-CCNC: 34 U/L (ref 5–33)
ANION GAP SERPL CALCULATED.3IONS-SCNC: 7 MMOL/L (ref 7–19)
AST SERPL-CCNC: 39 U/L (ref 5–32)
BILIRUB SERPL-MCNC: 0.3 MG/DL (ref 0.2–1.2)
BUN SERPL-MCNC: 16 MG/DL (ref 8–23)
CALCIUM SERPL-MCNC: 8.5 MG/DL (ref 8.8–10.2)
CHLORIDE SERPL-SCNC: 87 MMOL/L (ref 98–111)
CO2 SERPL-SCNC: 38 MMOL/L (ref 22–29)
CREAT SERPL-MCNC: 0.4 MG/DL (ref 0.5–0.9)
EKG P AXIS: NORMAL DEGREES
EKG P-R INTERVAL: NORMAL MS
EKG Q-T INTERVAL: 396 MS
EKG QRS DURATION: 136 MS
EKG QTC CALCULATION (BAZETT): 436 MS
EKG T AXIS: 112 DEGREES
GLUCOSE SERPL-MCNC: 91 MG/DL (ref 74–109)
POTASSIUM SERPL-SCNC: 4.3 MMOL/L (ref 3.5–5)
POTASSIUM SERPL-SCNC: 4.3 MMOL/L (ref 3.5–5)
PROT SERPL-MCNC: 5.7 G/DL (ref 6.6–8.7)
SODIUM SERPL-SCNC: 132 MMOL/L (ref 136–145)

## 2023-07-05 PROCEDURE — 6360000002 HC RX W HCPCS: Performed by: FAMILY MEDICINE

## 2023-07-05 PROCEDURE — 6370000000 HC RX 637 (ALT 250 FOR IP): Performed by: FAMILY MEDICINE

## 2023-07-05 PROCEDURE — 80053 COMPREHEN METABOLIC PANEL: CPT

## 2023-07-05 PROCEDURE — 51702 INSERT TEMP BLADDER CATH: CPT

## 2023-07-05 PROCEDURE — 2580000003 HC RX 258: Performed by: FAMILY MEDICINE

## 2023-07-05 PROCEDURE — 36415 COLL VENOUS BLD VENIPUNCTURE: CPT

## 2023-07-05 PROCEDURE — 93010 ELECTROCARDIOGRAM REPORT: CPT | Performed by: INTERNAL MEDICINE

## 2023-07-05 RX ORDER — POLYETHYLENE GLYCOL 3350 17 G/17G
17 POWDER, FOR SOLUTION ORAL DAILY PRN
Qty: 30 EACH | Refills: 0 | Status: SHIPPED | OUTPATIENT
Start: 2023-07-05 | End: 2023-08-04

## 2023-07-05 RX ORDER — FUROSEMIDE 20 MG/1
20 TABLET ORAL 2 TIMES DAILY
Qty: 60 TABLET | Refills: 3 | Status: SHIPPED | OUTPATIENT
Start: 2023-07-05

## 2023-07-05 RX ORDER — POTASSIUM CHLORIDE 20 MEQ/1
20 TABLET, EXTENDED RELEASE ORAL DAILY
Qty: 60 TABLET | Refills: 3 | Status: SHIPPED | OUTPATIENT
Start: 2023-07-05

## 2023-07-05 RX ADMIN — SODIUM CHLORIDE, PRESERVATIVE FREE 10 ML: 5 INJECTION INTRAVENOUS at 09:18

## 2023-07-05 RX ADMIN — ATORVASTATIN CALCIUM 40 MG: 40 TABLET, FILM COATED ORAL at 09:18

## 2023-07-05 RX ADMIN — GUAIFENESIN 1200 MG: 600 TABLET ORAL at 09:18

## 2023-07-05 RX ADMIN — GABAPENTIN 200 MG: 100 CAPSULE ORAL at 09:18

## 2023-07-05 RX ADMIN — PANTOPRAZOLE SODIUM 40 MG: 40 TABLET, DELAYED RELEASE ORAL at 05:56

## 2023-07-05 RX ADMIN — POTASSIUM CHLORIDE 20 MEQ: 1500 TABLET, EXTENDED RELEASE ORAL at 09:20

## 2023-07-05 RX ADMIN — ENOXAPARIN SODIUM 40 MG: 100 INJECTION SUBCUTANEOUS at 09:18

## 2023-07-05 NOTE — CARE COORDINATION
Has a [place] of [facility]. Can return when ready.   [address]  Electronically signed by Heron Gonzalez RN on 7/5/2023 at 11:39 AM

## 2023-07-05 NOTE — DISCHARGE SUMMARY
Hospital Discharge Summary    Rikki Mart  :  1940  MRN:  [id number]    Admit date:  7/3/2023  Discharge date:      Admitting Physician:    No admitting provider for patient encounter. Discharge Diagnoses:    Principal Problem:    Hypokalemia  Resolved Problems:    * No resolved hospital problems. [facility] Course:   She was admitted with hypokalemia. She has been replaced and her d/c labs are stable. She will need regular monitoring of K + after d/c until I can be sure labs will remain stable. Her VS are stable on d/c and she is eating. Discharge Medications:         Medication List        START taking these medications      polyethylene glycol 17 g packet  Commonly known as: GLYCOLAX  Take 17 g by mouth daily as needed for Constipation     potassium chloride 20 MEQ extended release tablet  Commonly known as: KLOR-CON M  Take 1 tablet by mouth daily            CHANGE how you take these medications      acetaminophen 325 MG tablet  Commonly known as: TYLENOL  What changed: Another medication with the same name was removed. Continue taking this medication, and follow the directions you see here.      furosemide 20 MG tablet  Commonly known as: Lasix  Take 1 tablet by mouth 2 times daily  What changed:   medication strength  how much to take            CONTINUE taking these medications      baclofen 10 MG tablet  Commonly known as: LIORESAL     benzonatate 100 MG capsule  Commonly known as: TESSALON     bisacodyl 5 MG EC tablet  Commonly known as: DULCOLAX     docusate sodium 100 MG capsule  Commonly known as: COLACE     gabapentin 100 MG capsule  Commonly known as: NEURONTIN     guaiFENesin 600 MG extended release tablet  Commonly known as: MUCINEX     ipratropium 0.03 % nasal spray  Commonly known as: ATROVENT     magnesium hydroxide 400 MG/5ML suspension  Commonly known as: MILK OF MAGNESIA     menthol-zinc oxide 0.44-20.625 % Oint ointment  Commonly known as: CALMOSEPTINE     mirtazapine 15 MG

## 2023-07-05 NOTE — CONSULTS
Comprehensive Nutrition Assessment    Type and Reason for Visit:  Initial, Consult    Nutrition Recommendations/Plan:   Follow for po intake, acceptance of ONS     Malnutrition Assessment:  Malnutrition Status:  No malnutrition (07/05/23 1154)    Context:  Acute Illness     Findings of the 6 clinical characteristics of malnutrition:  Energy Intake:  Mild decrease in energy intake (Comment)  Weight Loss:  No significant weight loss     Body Fat Loss:  No significant body fat loss     Muscle Mass Loss:  No significant muscle mass loss    Fluid Accumulation:  No significant fluid accumulation Extremities   Strength:  Not Performed    Nutrition Assessment:    Received consult for hypokalemia and malnutrition. Pt's K+ level has returned to normal range-- 4.3. Pt stated she hadn't been eating much because she wasn't hungry and didnot really like what she had been getting recently. Had used protein drink previously but ran out and hadn't had anyoone get her more. PO intake this a.m. was good with 50-75% of breakfast being consumed. Starting Ensure HI Protein at night and will recommend that continues once discharged back to NH. Nutrition Related Findings:    appears adequately nourished Wound Type: Skin Tears       Current Nutrition Intake & Therapies:    Average Meal Intake: 51-75%  Average Supplements Intake: None Ordered  ADULT DIET; Regular  ADULT ORAL NUTRITION SUPPLEMENT; Dinner; Low Calorie/High Protein Oral Supplement    Anthropometric Measures:  Height: 5' 8\" (172.7 cm)  Ideal Body Weight (IBW): 140 lbs (64 kg)    Admission Body Weight: 165 lb (74.8 kg)  Current Body Weight: 165 lb (74.8 kg), 117.9 % IBW. Current BMI (kg/m2): 25.1  Usual Body Weight: 165 lb (74.8 kg) (2/2023)  % Weight Change (Calculated): 0  BMI Categories: Overweight (BMI 25.0-29. 9)    Nutrition Diagnosis:   Inadequate oral intake, Altered nutrition-related lab values related to inadequate protein-energy intake as evidenced by

## 2023-08-31 ENCOUNTER — TELEPHONE (OUTPATIENT)
Dept: CARDIOLOGY CLINIC | Age: 83
End: 2023-08-31

## 2023-09-12 ENCOUNTER — OFFICE VISIT (OUTPATIENT)
Dept: CARDIOLOGY CLINIC | Age: 83
End: 2023-09-12
Payer: MEDICARE

## 2023-09-12 VITALS
SYSTOLIC BLOOD PRESSURE: 130 MMHG | DIASTOLIC BLOOD PRESSURE: 84 MMHG | HEART RATE: 70 BPM | HEIGHT: 68 IN | BODY MASS INDEX: 26.52 KG/M2 | WEIGHT: 175 LBS

## 2023-09-12 DIAGNOSIS — I49.5 SSS (SICK SINUS SYNDROME) (HCC): ICD-10-CM

## 2023-09-12 DIAGNOSIS — Z95.0 PACEMAKER: ICD-10-CM

## 2023-09-12 DIAGNOSIS — I49.5 SA NODE DYSFUNCTION (HCC): Primary | ICD-10-CM

## 2023-09-12 PROCEDURE — 1036F TOBACCO NON-USER: CPT | Performed by: CLINICAL NURSE SPECIALIST

## 2023-09-12 PROCEDURE — 99213 OFFICE O/P EST LOW 20 MIN: CPT | Performed by: CLINICAL NURSE SPECIALIST

## 2023-09-12 PROCEDURE — G8427 DOCREV CUR MEDS BY ELIG CLIN: HCPCS | Performed by: CLINICAL NURSE SPECIALIST

## 2023-09-12 PROCEDURE — 1090F PRES/ABSN URINE INCON ASSESS: CPT | Performed by: CLINICAL NURSE SPECIALIST

## 2023-09-12 PROCEDURE — G8400 PT W/DXA NO RESULTS DOC: HCPCS | Performed by: CLINICAL NURSE SPECIALIST

## 2023-09-12 PROCEDURE — 1123F ACP DISCUSS/DSCN MKR DOCD: CPT | Performed by: CLINICAL NURSE SPECIALIST

## 2023-09-12 PROCEDURE — 93280 PM DEVICE PROGR EVAL DUAL: CPT | Performed by: CLINICAL NURSE SPECIALIST

## 2023-09-12 PROCEDURE — G8417 CALC BMI ABV UP PARAM F/U: HCPCS | Performed by: CLINICAL NURSE SPECIALIST

## 2023-09-12 NOTE — PROGRESS NOTES
pressure heart rate controlled. Medical management includes no antihypertensives. PCP manages labs. On statin    Can take extra Lasix for couple days for worsening swelling. We will further discuss with Dr. Constantine Delcid who sees her at the nursing home         States taking medications as prescribed  Stable cardiovascular status. No evidence of overt heart failure, angina or dysrhythmia. 20 minutes were spent preparing, reviewing and seeing patient. All questions answered    Plan    Home pacemaker reading in 3 months-we will contact [facility]  12-12-23      Follow up in 6 months pacemaker interrogation  Call with any questions or concerns  Follow up with Susan Giron MD for non cardiac problems  Report any new problems  Cardiovascular Fitness-Exercise as tolerated. Cardiac / Healthy Diet- Avoid processed high fat foods, maintain low sodium/salt   Continue current medications as directed  Continue plan of treatment  It is always recommended that you bring your medications bottles with you to each visit - this is for your safety! CAMILLE Major    EMR dragon/transcription disclaimer: Much of this encounter note is electronic transcription/translation of spoken language to printed tach. Electronic translation of spoken language may be erroneous, or at times, nonsensical words or phrases may be inadvertently transcribed.  Although, I have reviewed the note for such errors, some may still exist.

## 2023-09-12 NOTE — PATIENT INSTRUCTIONS
Home pacemaker reading in 3 months-we will contact long term  12-12-23      Follow up in 6 months pacemaker interrogation  Call with any questions or concerns  Follow up with Glo Hyde MD for non cardiac problems  Report any new problems  Cardiovascular Fitness-Exercise as tolerated. Cardiac / Healthy Diet- Avoid processed high fat foods, maintain low sodium/salt   Continue current medications as directed  Continue plan of treatment  It is always recommended that you bring your medications bottles with you to each visit - this is for your safety!

## 2023-12-12 PROCEDURE — 93294 REM INTERROG EVL PM/LDLS PM: CPT | Performed by: CLINICAL NURSE SPECIALIST

## 2023-12-12 PROCEDURE — 93296 REM INTERROG EVL PM/IDS: CPT | Performed by: CLINICAL NURSE SPECIALIST

## 2023-12-13 DIAGNOSIS — I49.5 SA NODE DYSFUNCTION (HCC): ICD-10-CM

## 2023-12-13 DIAGNOSIS — Z95.0 PACEMAKER: Primary | ICD-10-CM

## 2024-01-14 ENCOUNTER — APPOINTMENT (OUTPATIENT)
Dept: GENERAL RADIOLOGY | Age: 84
End: 2024-01-14
Payer: MEDICARE

## 2024-01-14 ENCOUNTER — HOSPITAL ENCOUNTER (EMERGENCY)
Age: 84
Discharge: HOME | End: 2024-01-15
Attending: EMERGENCY MEDICINE
Payer: MEDICARE

## 2024-01-14 DIAGNOSIS — R60.0 BILATERAL LOWER EXTREMITY EDEMA: Primary | ICD-10-CM

## 2024-01-14 LAB
ALBUMIN SERPL-MCNC: 2.4 G/DL (ref 3.5–5.2)
ALP SERPL-CCNC: 293 U/L (ref 35–104)
ALT SERPL-CCNC: 60 U/L (ref 5–33)
ANION GAP SERPL CALCULATED.3IONS-SCNC: 4 MMOL/L (ref 7–19)
AST SERPL-CCNC: 62 U/L (ref 5–32)
BASOPHILS # BLD: 0 K/UL (ref 0–0.2)
BASOPHILS NFR BLD: 0.4 % (ref 0–1)
BILIRUB SERPL-MCNC: 0.3 MG/DL (ref 0.2–1.2)
BNP BLD-MCNC: 1610 PG/ML (ref 0–449)
BUN SERPL-MCNC: 10 MG/DL (ref 8–23)
CALCIUM SERPL-MCNC: 8.2 MG/DL (ref 8.8–10.2)
CHLORIDE SERPL-SCNC: 89 MMOL/L (ref 98–111)
CO2 SERPL-SCNC: 35 MMOL/L (ref 22–29)
CREAT SERPL-MCNC: 0.4 MG/DL (ref 0.5–0.9)
EOSINOPHIL # BLD: 0 K/UL (ref 0–0.6)
EOSINOPHIL NFR BLD: 0.4 % (ref 0–5)
ERYTHROCYTE [DISTWIDTH] IN BLOOD BY AUTOMATED COUNT: 15.5 % (ref 11.5–14.5)
GLUCOSE SERPL-MCNC: 97 MG/DL (ref 74–109)
HCT VFR BLD AUTO: 42.1 % (ref 37–47)
HGB BLD-MCNC: 13.1 G/DL (ref 12–16)
IMM GRANULOCYTES # BLD: 0 K/UL
LYMPHOCYTES # BLD: 1.3 K/UL (ref 1.1–4.5)
LYMPHOCYTES NFR BLD: 17.2 % (ref 20–40)
MCH RBC QN AUTO: 28.5 PG (ref 27–31)
MCHC RBC AUTO-ENTMCNC: 31.1 G/DL (ref 33–37)
MCV RBC AUTO: 91.7 FL (ref 81–99)
MONOCYTES # BLD: 0.9 K/UL (ref 0–0.9)
MONOCYTES NFR BLD: 12.1 % (ref 0–10)
NEUTROPHILS # BLD: 5.3 K/UL (ref 1.5–7.5)
NEUTS SEG NFR BLD: 69.4 % (ref 50–65)
PLATELET # BLD AUTO: 197 K/UL (ref 130–400)
PMV BLD AUTO: 11.9 FL (ref 9.4–12.3)
POTASSIUM SERPL-SCNC: 4.6 MMOL/L (ref 3.5–5)
PROT SERPL-MCNC: 5.5 G/DL (ref 6.6–8.7)
RBC # BLD AUTO: 4.59 M/UL (ref 4.2–5.4)
SODIUM SERPL-SCNC: 128 MMOL/L (ref 136–145)
WBC # BLD AUTO: 7.6 K/UL (ref 4.8–10.8)

## 2024-01-14 PROCEDURE — 71045 X-RAY EXAM CHEST 1 VIEW: CPT

## 2024-01-14 PROCEDURE — 93005 ELECTROCARDIOGRAM TRACING: CPT | Performed by: EMERGENCY MEDICINE

## 2024-01-14 PROCEDURE — 99285 EMERGENCY DEPT VISIT HI MDM: CPT

## 2024-01-14 PROCEDURE — 6360000002 HC RX W HCPCS: Performed by: EMERGENCY MEDICINE

## 2024-01-14 PROCEDURE — 96374 THER/PROPH/DIAG INJ IV PUSH: CPT

## 2024-01-14 RX ORDER — FUROSEMIDE 10 MG/ML
40 INJECTION INTRAMUSCULAR; INTRAVENOUS ONCE
Status: COMPLETED | OUTPATIENT
Start: 2024-01-14 | End: 2024-01-14

## 2024-01-14 RX ORDER — POLYETHYLENE GLYCOL 3350 17 G/17G
17 POWDER, FOR SOLUTION ORAL DAILY PRN
COMMUNITY

## 2024-01-14 RX ADMIN — FUROSEMIDE 40 MG: 10 INJECTION, SOLUTION INTRAMUSCULAR; INTRAVENOUS at 23:46

## 2024-01-14 ASSESSMENT — PAIN - FUNCTIONAL ASSESSMENT: PAIN_FUNCTIONAL_ASSESSMENT: NONE - DENIES PAIN

## 2024-01-15 ENCOUNTER — TELEPHONE (OUTPATIENT)
Dept: CARDIOLOGY CLINIC | Age: 84
End: 2024-01-15

## 2024-01-15 VITALS
RESPIRATION RATE: 18 BRPM | SYSTOLIC BLOOD PRESSURE: 113 MMHG | DIASTOLIC BLOOD PRESSURE: 52 MMHG | WEIGHT: 162 LBS | TEMPERATURE: 96.7 F | OXYGEN SATURATION: 100 % | BODY MASS INDEX: 24.63 KG/M2 | HEART RATE: 74 BPM

## 2024-01-15 LAB
EKG P AXIS: 48 DEGREES
EKG P-R INTERVAL: 110 MS
EKG Q-T INTERVAL: 354 MS
EKG QRS DURATION: 144 MS
EKG QTC CALCULATION (BAZETT): 404 MS
EKG T AXIS: -78 DEGREES

## 2024-01-15 PROCEDURE — 85025 COMPLETE CBC W/AUTO DIFF WBC: CPT

## 2024-01-15 PROCEDURE — 80053 COMPREHEN METABOLIC PANEL: CPT

## 2024-01-15 PROCEDURE — 83880 ASSAY OF NATRIURETIC PEPTIDE: CPT

## 2024-01-15 PROCEDURE — 36415 COLL VENOUS BLD VENIPUNCTURE: CPT

## 2024-01-15 PROCEDURE — 93010 ELECTROCARDIOGRAM REPORT: CPT | Performed by: INTERNAL MEDICINE

## 2024-01-15 NOTE — ED PROVIDER NOTES
[facility] EMERGENCY DEPT  eMERGENCY dEPARTMENT eNCOUnter      Pt Name: Ellen F Cryer  MRN: [id number]  Birthdate 1940  Date of evaluation: 1/14/2024  Provider: VIDA CALIX MD    CHIEF COMPLAINT       Chief Complaint   Patient presents with    Abnormal Lab     Patient arrive by [facility] from [facility].  Per the nursing home patient has an elevated BNP           HISTORY OF PRESENT ILLNESS   (Location/Symptom, Timing/Onset,Context/Setting, Quality, Duration, Modifying Factors, Severity)  Note limiting factors.   Ellen F Cryer is a 83 y.o. female who presents to the emergency department for abnormal labs.  Patient had blood work drawn and was sent here for elevated BNP.  Does admit she has had increased bilateral lower extremity edema over the past 1 month.  She wears 2 to 3 L of oxygen at baseline and denies any shortness of breath.  No chest pain.  Patient has a history of fluid retention and diastolic dysfunction listed and does appear she takes diuretics however unable to see any prior echocardiograms on chart review.  Patient does not ambulate.    The history is provided by the patient and the EMS personnel.       NursingNotes were reviewed.    REVIEW OF SYSTEMS    (2-9 systems for level 4, 10 or more for level 5)     Review of Systems   Constitutional:  Negative for chills and fever.   HENT:  Negative for rhinorrhea and sore throat.    Respiratory:  Negative for cough and shortness of breath.    Cardiovascular:  Positive for leg swelling. Negative for chest pain.   Gastrointestinal:  Negative for abdominal pain, diarrhea, nausea and vomiting.   Genitourinary:  Negative for dysuria, frequency and urgency.   Musculoskeletal:  Negative for back pain and neck pain.   Neurological:  Negative for dizziness and headaches.   All other systems reviewed and are negative.           PAST MEDICALHISTORY     Past Medical History:   Diagnosis Date    CAD (coronary artery disease)     Depressive disorder     GERD (gastroesophageal

## 2024-01-15 NOTE — DISCHARGE INSTRUCTIONS
Needs follow up with PCP or cardiology to discuss possible echo and if want to adjust diuretics.    Increase lasix to 40mg BID for next 3 days.

## 2024-01-15 NOTE — TELEPHONE ENCOUNTER
Called and left message with Jil for patient letting them know it is time to send in their carelink reading.   If they have any questions they can call the office at [phone].

## 2024-01-15 NOTE — ED NOTES
Pt's nurse called to inform us that pt has a \"lump in her pubic area\" that she is concerned about and wanted looked at. Dr. Diaz and this RN went to bedside to examine and no lump could be seen or palpated at that time.

## 2024-01-15 NOTE — ED NOTES
Patient placed on cardiac monitor, continuous pulse oximeter, and NIBP monitor. Monitor alarms on.

## 2024-01-16 DIAGNOSIS — I49.5 SA NODE DYSFUNCTION (HCC): ICD-10-CM

## 2024-01-16 DIAGNOSIS — Z95.0 PACEMAKER: Primary | ICD-10-CM

## 2024-01-24 ENCOUNTER — OFFICE VISIT (OUTPATIENT)
Dept: CARDIOLOGY CLINIC | Age: 84
End: 2024-01-24
Payer: MEDICARE

## 2024-01-24 VITALS
SYSTOLIC BLOOD PRESSURE: 122 MMHG | BODY MASS INDEX: 25.76 KG/M2 | HEIGHT: 68 IN | DIASTOLIC BLOOD PRESSURE: 66 MMHG | WEIGHT: 170 LBS | HEART RATE: 85 BPM

## 2024-01-24 DIAGNOSIS — I51.89 DIASTOLIC DYSFUNCTION: ICD-10-CM

## 2024-01-24 DIAGNOSIS — E78.2 MIXED HYPERLIPIDEMIA: ICD-10-CM

## 2024-01-24 DIAGNOSIS — I50.9 CHRONIC CONGESTIVE HEART FAILURE, UNSPECIFIED HEART FAILURE TYPE (HCC): ICD-10-CM

## 2024-01-24 DIAGNOSIS — Z95.0 PACEMAKER: ICD-10-CM

## 2024-01-24 DIAGNOSIS — I49.5 SA NODE DYSFUNCTION (HCC): Primary | ICD-10-CM

## 2024-01-24 LAB
ANION GAP SERPL CALCULATED.3IONS-SCNC: 4 MMOL/L (ref 7–19)
BNP BLD-MCNC: 640 PG/ML (ref 0–449)
BUN SERPL-MCNC: 9 MG/DL (ref 8–23)
CALCIUM SERPL-MCNC: 8.6 MG/DL (ref 8.8–10.2)
CHLORIDE SERPL-SCNC: 92 MMOL/L (ref 98–111)
CO2 SERPL-SCNC: 39 MMOL/L (ref 22–29)
CREAT SERPL-MCNC: 0.5 MG/DL (ref 0.5–0.9)
GLUCOSE SERPL-MCNC: 67 MG/DL (ref 74–109)
POTASSIUM SERPL-SCNC: 4.3 MMOL/L (ref 3.5–5)
SODIUM SERPL-SCNC: 135 MMOL/L (ref 136–145)

## 2024-01-24 PROCEDURE — G8427 DOCREV CUR MEDS BY ELIG CLIN: HCPCS | Performed by: CLINICAL NURSE SPECIALIST

## 2024-01-24 PROCEDURE — 1090F PRES/ABSN URINE INCON ASSESS: CPT | Performed by: CLINICAL NURSE SPECIALIST

## 2024-01-24 PROCEDURE — 93280 PM DEVICE PROGR EVAL DUAL: CPT | Performed by: CLINICAL NURSE SPECIALIST

## 2024-01-24 PROCEDURE — 1036F TOBACCO NON-USER: CPT | Performed by: CLINICAL NURSE SPECIALIST

## 2024-01-24 PROCEDURE — G8400 PT W/DXA NO RESULTS DOC: HCPCS | Performed by: CLINICAL NURSE SPECIALIST

## 2024-01-24 PROCEDURE — 1123F ACP DISCUSS/DSCN MKR DOCD: CPT | Performed by: CLINICAL NURSE SPECIALIST

## 2024-01-24 PROCEDURE — G8484 FLU IMMUNIZE NO ADMIN: HCPCS | Performed by: CLINICAL NURSE SPECIALIST

## 2024-01-24 PROCEDURE — G8417 CALC BMI ABV UP PARAM F/U: HCPCS | Performed by: CLINICAL NURSE SPECIALIST

## 2024-01-24 PROCEDURE — 99214 OFFICE O/P EST MOD 30 MIN: CPT | Performed by: CLINICAL NURSE SPECIALIST

## 2024-01-24 NOTE — PROGRESS NOTES
[facility]  [address] [address]  Phone: [phone]  Fax: [phone]    OFFICE VISIT:  2024    Ellen F Cryer - : 1940    Reason For Visit:  Beatriz is a 83 y.o. female who is here for Follow-up (Edema  lower extremity)  Pacemaker placed in  for sick sinus syndrome.  Chronically ill with COPD in wheelchair residing at [facility]  Here today for routine follow-up and pacemaker interrogation.    Patient was evaluated in the emergency room 2024 due to elevated BNP and worsening peripheral edema.    She was given some IV Lasix and increased her p.o. Lasix for 3 days.  She returns today for follow-up.  She was brought to here today by EMS.  She is on a stretcher.  She states she is not been able to set up an her wheelchair much at the nursing home.  She does sit up in the bed some.  Does not think the swelling in her legs has gotten any better.         Subjective  Beatriz denies exertional chest pain, shortness of breath, orthopnea, paroxysmal nocturnal dyspnea, syncope, presyncope, arrhythmia, .  The patient denies numbness or weakness to suggest cerebrovascular accident or transient ischemic attack.    Jessica Carlson MD is PCP and follows labs .  Ellen F Cryer has the following history as recorded in [facility]:    Patient Active Problem List    Diagnosis Date Noted    Hypokalemia 2023    Mixed hyperlipidemia 2021    Scalp cyst 2019    Other osteomyelitis, other site (HCC) 2018    Severe protein-calorie malnutrition (HCC)     Decubitus ulcer of sacral region, stage 4 (HCC) 10/12/2017    Debilitated 10/12/2017    Malnutrition (HCC) 10/12/2017    H/O back injury 10/12/2017    Incontinence of feces 10/12/2017    SA node dysfunction (HCC) 2017    Pacemaker 2017    Diastolic dysfunction 2017     Past Medical History:   Diagnosis Date    CAD (coronary artery disease)     Depressive disorder     GERD

## 2024-02-08 ENCOUNTER — TELEPHONE (OUTPATIENT)
Dept: CARDIOLOGY CLINIC | Age: 84
End: 2024-02-08

## 2024-02-08 DIAGNOSIS — Z95.0 PACEMAKER: Primary | ICD-10-CM

## 2024-02-08 DIAGNOSIS — Z45.010 PACEMAKER BATTERY DEPLETION: ICD-10-CM

## 2024-02-08 DIAGNOSIS — I49.5 SA NODE DYSFUNCTION (HCC): ICD-10-CM

## 2024-02-08 NOTE — TELEPHONE ENCOUNTER
----- Message from CAMILLE Costa sent at 2/8/2024  9:05 AM CST -----  Old Dr. Lin patient.  Pacemaker has reached JANNA as of 1/26/2024  In a nursing home.  Needs set up for GEN change please.

## 2024-02-08 NOTE — TELEPHONE ENCOUNTER
Called and spoke with SNF to scheduled gen change. SNF is discussing with the Pt, and will call back and let us know.

## 2024-02-09 NOTE — TELEPHONE ENCOUNTER
[place] at the [facility] [place] [facility] located on the first floor [facility].   Enter through hospital main entrance and turn immediately to your left.     Procedure Date: 2/14/2024  Procedure Arrival Time: 10:00AM   Procedure Start Time: 12:00PM   ( Times are subject to change)     Please have your lab work ( CBC and BMP) done before your procedure Date.     Procedure Instructions:   1) You will need to not have anything to eat or drink the morning before the procedure.   2) You may stay overnight for observation.  3) You can still take all of your morning medication with a sip of water   4) You will need a  for the procedure.      I discussed the above instructions with SNF, who voiced her understanding and will inform the PT.

## 2024-02-14 ENCOUNTER — HOSPITAL ENCOUNTER (OUTPATIENT)
Dept: CARDIAC CATH/INVASIVE PROCEDURES | Age: 84
Discharge: HOME | End: 2024-02-14
Attending: INTERNAL MEDICINE | Admitting: INTERNAL MEDICINE
Payer: MEDICARE

## 2024-02-14 ENCOUNTER — TELEPHONE (OUTPATIENT)
Dept: CARDIOLOGY CLINIC | Age: 84
End: 2024-02-14

## 2024-02-14 VITALS
BODY MASS INDEX: 25.76 KG/M2 | RESPIRATION RATE: 14 BRPM | TEMPERATURE: 98 F | SYSTOLIC BLOOD PRESSURE: 95 MMHG | HEART RATE: 78 BPM | WEIGHT: 170 LBS | OXYGEN SATURATION: 95 % | DIASTOLIC BLOOD PRESSURE: 63 MMHG | HEIGHT: 68 IN

## 2024-02-14 PROBLEM — Z45.018 PACEMAKER END OF LIFE: Status: ACTIVE | Noted: 2024-02-14

## 2024-02-14 LAB
ANION GAP SERPL CALCULATED.3IONS-SCNC: 5 MMOL/L (ref 7–19)
BASOPHILS # BLD: 0 K/UL (ref 0–0.2)
BASOPHILS NFR BLD: 0.8 % (ref 0–1)
BUN SERPL-MCNC: 7 MG/DL (ref 8–23)
CALCIUM SERPL-MCNC: 8.3 MG/DL (ref 8.8–10.2)
CHLORIDE SERPL-SCNC: 93 MMOL/L (ref 98–111)
CO2 SERPL-SCNC: 40 MMOL/L (ref 22–29)
CREAT SERPL-MCNC: 0.3 MG/DL (ref 0.5–0.9)
EOSINOPHIL # BLD: 0 K/UL (ref 0–0.6)
EOSINOPHIL NFR BLD: 0.8 % (ref 0–5)
ERYTHROCYTE [DISTWIDTH] IN BLOOD BY AUTOMATED COUNT: 14.3 % (ref 11.5–14.5)
GLUCOSE SERPL-MCNC: 76 MG/DL (ref 74–109)
HCT VFR BLD AUTO: 40.7 % (ref 37–47)
HGB BLD-MCNC: 12.4 G/DL (ref 12–16)
IMM GRANULOCYTES # BLD: 0 K/UL
LYMPHOCYTES # BLD: 1.2 K/UL (ref 1.1–4.5)
LYMPHOCYTES NFR BLD: 23.7 % (ref 20–40)
MCH RBC QN AUTO: 28.6 PG (ref 27–31)
MCHC RBC AUTO-ENTMCNC: 30.5 G/DL (ref 33–37)
MCV RBC AUTO: 94 FL (ref 81–99)
MONOCYTES # BLD: 0.6 K/UL (ref 0–0.9)
MONOCYTES NFR BLD: 11.1 % (ref 0–10)
NEUTROPHILS # BLD: 3.3 K/UL (ref 1.5–7.5)
NEUTS SEG NFR BLD: 63.4 % (ref 50–65)
PLATELET # BLD AUTO: 188 K/UL (ref 130–400)
PMV BLD AUTO: 11.3 FL (ref 9.4–12.3)
POTASSIUM SERPL-SCNC: 3.6 MMOL/L (ref 3.5–5)
RBC # BLD AUTO: 4.33 M/UL (ref 4.2–5.4)
SODIUM SERPL-SCNC: 138 MMOL/L (ref 136–145)
WBC # BLD AUTO: 5.2 K/UL (ref 4.8–10.8)

## 2024-02-14 PROCEDURE — 85025 COMPLETE CBC W/AUTO DIFF WBC: CPT

## 2024-02-14 PROCEDURE — 6370000000 HC RX 637 (ALT 250 FOR IP): Performed by: INTERNAL MEDICINE

## 2024-02-14 PROCEDURE — 80048 BASIC METABOLIC PNL TOTAL CA: CPT

## 2024-02-14 PROCEDURE — 2709999900 HC NON-CHARGEABLE SUPPLY

## 2024-02-14 PROCEDURE — 36415 COLL VENOUS BLD VENIPUNCTURE: CPT

## 2024-02-14 PROCEDURE — 2580000003 HC RX 258: Performed by: INTERNAL MEDICINE

## 2024-02-14 PROCEDURE — 99238 HOSP IP/OBS DSCHRG MGMT 30/<: CPT | Performed by: INTERNAL MEDICINE

## 2024-02-14 PROCEDURE — 2720000010 HC SURG SUPPLY STERILE

## 2024-02-14 PROCEDURE — 33228 REMV&REPLC PM GEN DUAL LEAD: CPT | Performed by: INTERNAL MEDICINE

## 2024-02-14 PROCEDURE — C1785 PMKR, DUAL, RATE-RESP: HCPCS

## 2024-02-14 PROCEDURE — C1889 IMPLANT/INSERT DEVICE, NOC: HCPCS

## 2024-02-14 PROCEDURE — 2500000003 HC RX 250 WO HCPCS

## 2024-02-14 PROCEDURE — 6360000002 HC RX W HCPCS

## 2024-02-14 PROCEDURE — 99152 MOD SED SAME PHYS/QHP 5/>YRS: CPT

## 2024-02-14 PROCEDURE — 33228 REMV&REPLC PM GEN DUAL LEAD: CPT

## 2024-02-14 RX ORDER — SODIUM CHLORIDE 9 MG/ML
INJECTION, SOLUTION INTRAVENOUS CONTINUOUS
Status: DISCONTINUED | OUTPATIENT
Start: 2024-02-14 | End: 2024-02-14 | Stop reason: HOSPADM

## 2024-02-14 RX ORDER — FLUOXETINE 10 MG/1
10 TABLET, FILM COATED ORAL DAILY
COMMUNITY

## 2024-02-14 RX ORDER — FUROSEMIDE 40 MG/1
40 TABLET ORAL 2 TIMES DAILY
COMMUNITY

## 2024-02-14 RX ORDER — SODIUM CHLORIDE 0.9 % (FLUSH) 0.9 %
5-40 SYRINGE (ML) INJECTION EVERY 12 HOURS SCHEDULED
Status: DISCONTINUED | OUTPATIENT
Start: 2024-02-14 | End: 2024-02-14 | Stop reason: HOSPADM

## 2024-02-14 RX ORDER — CHLORHEXIDINE GLUCONATE 40 MG/ML
SOLUTION TOPICAL ONCE
Status: COMPLETED | OUTPATIENT
Start: 2024-02-14 | End: 2024-02-14

## 2024-02-14 RX ORDER — SODIUM CHLORIDE 9 MG/ML
INJECTION, SOLUTION INTRAVENOUS PRN
Status: DISCONTINUED | OUTPATIENT
Start: 2024-02-14 | End: 2024-02-14 | Stop reason: HOSPADM

## 2024-02-14 RX ORDER — SODIUM CHLORIDE 0.9 % (FLUSH) 0.9 %
5-40 SYRINGE (ML) INJECTION PRN
Status: DISCONTINUED | OUTPATIENT
Start: 2024-02-14 | End: 2024-02-14 | Stop reason: HOSPADM

## 2024-02-14 RX ADMIN — SODIUM CHLORIDE: 9 INJECTION, SOLUTION INTRAVENOUS at 11:12

## 2024-02-14 RX ADMIN — CHLORHEXIDINE GLUCONATE: 4 LIQUID TOPICAL at 11:30

## 2024-02-14 NOTE — PROCEDURES
After obtaining informed written consent, the patient was brought to the catheterization laboratory where the left chest area was prepared in the usual sterile fashion.  The patient was monitored continuously with ECG, pulse oximetry, blood pressure monitoring and direct observation.    The 's hands were scrubbed in a betadine solution for 5 minutes.  Utilizing local anesthesia and the plasma blade, the pacemaker pocket was entered.  The generator was removed from the pocket and the pocket was revised.  The leads were freed up from the floor of the pocket.  The thresholds of the leads were tested and found to be appropriate.      The pacemaker pocket was copiously irrigated with antibiotic solution.  The leads were attached to the generator, coiled in the pocket, wrapped in antibiotic pounch, and the subcutaneous and cutaneous tissues were approximated, and a sterile dressing applied.  She was then taken to her room in stable and satisfactory condition.  TOP 12:45-1:00  EBL < 5cc  MP 2 ASA 2  1mg versed 25mcg fentanyl    Complications:  none      Technical Information:           Pulse Width (ms) Voltage (V)  Impedance (Ohms) P / R Wave (mV)    Left Ventricle        Right Atrial Lead 0.5 0.5  475 1.5   Right Ventricular Lead 0.5 1.0  475 5.5         IMPRESSION:    1.  Successful pacemaker generator change      Electronically signed by ROSANGELA WALLACE MD on 2/14/24

## 2024-02-14 NOTE — TELEPHONE ENCOUNTER
Darcie with [facility] called and requested wound check be rescheduled to 2/20,2/21/ or 2/26 due to patient has to come by ambulance and that is the days available.  Since this is a wound check appointment and patient resides in a nursing facility, we can do wound check visit over the phone and staff will need to send us a wound photo at that time.  We can review instructions over the phone.  Instructions given to Darcie to set up patients carelink monitor at her bedside and send the set up transmission.

## 2024-02-14 NOTE — DISCHARGE INSTRUCTIONS
KEEP DRESSING DRY AND INTACT UNTIL YOUR SITE CHECK APPOINTMENT.    DO NOT LIFT ANYTHING HEAVIER THEAN 10 LBS FOR 2 DAYS.  WATCH FOR SIGNS AND SYMPTOMS OF INFECTION AS DISCUSSED SUCH AS GREEN/YELLOW DRAINAGE FROM THE SITE, WARM TO TOUCH, AREA RED, OR SPIKE A TEMP OVER 101.0    IF BLEEDING SHOULD OCCUR, SIT UPRIGHT AND HOLD DIRECT PRESSURE OVER SITE APPROXIMATELY 10 MINUTES.      [employer] BOOKLET AND PATIENT ID CARDS PLACED IN ENVELOPE FOR [facility].    PROCEDURE REPORT SENT TO [facility].

## 2024-02-14 NOTE — H&P
02/14/2024     Lab Results   Component Value Date     02/14/2024    K 3.6 02/14/2024    CL 93 (L) 02/14/2024    CO2 40 (H) 02/14/2024    BUN 7 (L) 02/14/2024    CREATININE 0.3 (L) 02/14/2024    GLUCOSE 76 02/14/2024    CALCIUM 8.3 (L) 02/14/2024    PROT 5.5 (L) 01/14/2024    LABALBU 2.4 (L) 01/14/2024    BILITOT 0.3 01/14/2024    ALKPHOS 293 (H) 01/14/2024    AST 62 (H) 01/14/2024    ALT 60 (H) 01/14/2024    LABGLOM >60 02/14/2024    GFRAA >59 11/27/2020     BNP 1610  Improved to 640 today         States taking medications as prescribed  Stable cardiovascular status. No evidence of overt heart failure, angina or dysrhythmia.   30 minutes were spent preparing, reviewing and seeing patient.  All questions answered    Plan    Home pacemaker reading in 1months-we will contact nursing home  Lasix 40 mg twice a day x 3 days  Will plan on 2D echo when she comes in for her generator replacement  Call with any questions or concerns  Follow up with Jessica Carlson MD for non cardiac problems  Report any new problems  Cardiovascular Fitness-Exercise as tolerated.    Cardiac / Healthy Diet- Avoid processed high fat foods, maintain low sodium/salt   Continue current medications as directed  Continue plan of treatment  It is always recommended that you bring your medications bottles with you to each visit - this is for your safety!       CAMILLE Diez    EMR dragon/transcription disclaimer: Much of this encounter note is electronic transcription/translation of spoken language to printed tach. Electronic translation of spoken language may be erroneous, or at times, nonsensical words or phrases may be inadvertently transcribed. Although, I have reviewed the note for such errors, some may still exist.    Addendum 2/14/24 : Pt seen and examined. Agree with plan. Echo was not performed for some reason.

## 2024-02-14 NOTE — PROGRESS NOTES
REPORT CALLED TO RICH KAUR AT [facility]. INFORMED HER OF SITE STATUS - DRESSING D/I. NO HEMATOMA. INFORMED HER OF SEDATION MEDS RECEIVED & ANCEF IV RECEIVED. AVS REVIEWED. [employer] BOOKLET & PT ID CARD EXPLAINED & WILL BE SENT WITH PT ALONG WITH PROCEDURE REPORT. VSS & GIVEN.

## 2024-02-23 ENCOUNTER — SCHEDULED TELEPHONE ENCOUNTER (OUTPATIENT)
Dept: CARDIOLOGY CLINIC | Age: 84
End: 2024-02-23

## 2024-02-23 DIAGNOSIS — Z51.89 VISIT FOR WOUND CHECK: Primary | ICD-10-CM

## 2024-02-23 NOTE — PROGRESS NOTES
Patient calling in for a wound check visit status post Pacemaker implant.  Incision dry and intact.  No redness, swelling, or drainage noted  Edges well approximated  Instructed patient to wash area with antibacterial soap and keep it clean and dry.  Reviewed discharged instructions and questions answered.  Reviewed [facility] home monitor and patient verbalized understanding  Follow up as scheduled

## 2024-02-27 ENCOUNTER — TELEPHONE (OUTPATIENT)
Dept: CARDIOLOGY CLINIC | Age: 84
End: 2024-02-27

## 2024-02-27 NOTE — TELEPHONE ENCOUNTER
Jil at [facility] called wanting to know if there was any new orders for pt after her wound check. Pls call Jil back at [phone]

## 2024-03-13 ENCOUNTER — OFFICE VISIT (OUTPATIENT)
Dept: CARDIOLOGY CLINIC | Age: 84
End: 2024-03-13
Payer: MEDICARE

## 2024-03-13 VITALS
BODY MASS INDEX: 21.52 KG/M2 | WEIGHT: 142 LBS | HEART RATE: 68 BPM | SYSTOLIC BLOOD PRESSURE: 128 MMHG | DIASTOLIC BLOOD PRESSURE: 54 MMHG | HEIGHT: 68 IN

## 2024-03-13 DIAGNOSIS — Z95.0 PACEMAKER: ICD-10-CM

## 2024-03-13 DIAGNOSIS — I49.5 SA NODE DYSFUNCTION (HCC): Primary | ICD-10-CM

## 2024-03-13 PROCEDURE — G8400 PT W/DXA NO RESULTS DOC: HCPCS | Performed by: CLINICAL NURSE SPECIALIST

## 2024-03-13 PROCEDURE — 99213 OFFICE O/P EST LOW 20 MIN: CPT | Performed by: CLINICAL NURSE SPECIALIST

## 2024-03-13 PROCEDURE — 1123F ACP DISCUSS/DSCN MKR DOCD: CPT | Performed by: CLINICAL NURSE SPECIALIST

## 2024-03-13 PROCEDURE — G8427 DOCREV CUR MEDS BY ELIG CLIN: HCPCS | Performed by: CLINICAL NURSE SPECIALIST

## 2024-03-13 PROCEDURE — 1090F PRES/ABSN URINE INCON ASSESS: CPT | Performed by: CLINICAL NURSE SPECIALIST

## 2024-03-13 PROCEDURE — 1036F TOBACCO NON-USER: CPT | Performed by: CLINICAL NURSE SPECIALIST

## 2024-03-13 PROCEDURE — G8484 FLU IMMUNIZE NO ADMIN: HCPCS | Performed by: CLINICAL NURSE SPECIALIST

## 2024-03-13 PROCEDURE — G8420 CALC BMI NORM PARAMETERS: HCPCS | Performed by: CLINICAL NURSE SPECIALIST

## 2024-03-13 PROCEDURE — 93280 PM DEVICE PROGR EVAL DUAL: CPT | Performed by: CLINICAL NURSE SPECIALIST

## 2024-03-13 RX ORDER — CETIRIZINE HYDROCHLORIDE 10 MG/1
10 TABLET ORAL DAILY
COMMUNITY

## 2024-03-13 NOTE — PATIENT INSTRUCTIONS
Plan  Return to see Dr. Herzog  in 12 months.  Continue same medications.  Call with any problems, questions or concerns.  Continue to see primary care provider for non cardiac concerns .  Send [facility]Link in 3 mos- education provided.

## 2024-03-13 NOTE — PROGRESS NOTES
[facility]  [address] [place] [address]  Phone: [phone]  Fax: [phone]    OFFICE VISIT:  3/13/24    Ellen F Cryer - : 1940    Reason For Visit:  Beatriz is a 83 y.o. female who is here for Follow-up and Irregular Heart Beat (Pacemaker   no symptoms)   from a recent hospitalization for pacemaker generator replacement.    She is a resident in nursing home.  She is here today on stretcher transported by EMS.  She reports she is doing well.  She has bed ridden.  Has urinary catheter.     Subjective  Beatriz denies exertional chest pain, shortness of breath, orthopnea, paroxysmal nocturnal dyspnea, syncope, presyncope, arrythmia, edema and fatigue.  The patient denies numbness or weakness to suggest cerebrovascular accident or transient ischemic attack.      Ellen F Cryer has the following history as recorded in [facility]:    Patient Active Problem List    Diagnosis Date Noted    Pacemaker end of life 2024    Hypokalemia 2023    Mixed hyperlipidemia 2021    Scalp cyst 2019    Other osteomyelitis, other site (HCC) 2018    Severe protein-calorie malnutrition (HCC)     Decubitus ulcer of sacral region, stage 4 (HCC) 10/12/2017    Debilitated 10/12/2017    Malnutrition (HCC) 10/12/2017    H/O back injury 10/12/2017    Incontinence of feces 10/12/2017    SA node dysfunction (HCC) 2017    Pacemaker 2017    Diastolic dysfunction 2017     Past Medical History:   Diagnosis Date    CAD (coronary artery disease)     Depressive disorder     GERD (gastroesophageal reflux disease)     bleeding ulcer    Hemorrhage of gastrointestinal tract     Hyperlipidemia     Osteoarthrosis     Pacemaker     Retention of fluid     Sick sinus syndrome (HCC)     Sinus node dysfunction (HCC)      Past Surgical History:   Procedure Laterality Date    APPENDECTOMY      BACK SURGERY      CHOLECYSTECTOMY      COLONOSCOPY      DILATATION, ESOPHAGUS

## 2024-06-17 ENCOUNTER — TELEPHONE (OUTPATIENT)
Dept: CARDIOLOGY CLINIC | Age: 84
End: 2024-06-17

## 2024-06-17 NOTE — TELEPHONE ENCOUNTER
I spoke with the patient's nurse regarding their Care[facility] report that is due to be sent in. They verbalized an understanding and will be sending a report in as soon as they are able.

## 2024-06-18 DIAGNOSIS — I49.5 SA NODE DYSFUNCTION (HCC): ICD-10-CM

## 2024-06-18 DIAGNOSIS — Z95.0 PACEMAKER: Primary | ICD-10-CM

## 2024-09-19 DIAGNOSIS — Z95.0 PACEMAKER: Primary | ICD-10-CM

## 2024-09-19 DIAGNOSIS — I49.5 SA NODE DYSFUNCTION (HCC): ICD-10-CM

## 2024-12-31 DIAGNOSIS — Z95.0 PACEMAKER: Primary | ICD-10-CM

## 2024-12-31 DIAGNOSIS — I49.5 SA NODE DYSFUNCTION (HCC): ICD-10-CM

## 2025-04-01 ENCOUNTER — RESULTS FOLLOW-UP (OUTPATIENT)
Dept: CARDIOLOGY CLINIC | Age: 85
End: 2025-04-01

## 2025-04-01 DIAGNOSIS — I49.5 SA NODE DYSFUNCTION (HCC): ICD-10-CM

## 2025-04-01 DIAGNOSIS — Z95.0 PACEMAKER: Primary | ICD-10-CM

## 2025-07-02 DIAGNOSIS — I49.5 SA NODE DYSFUNCTION (HCC): ICD-10-CM

## 2025-07-02 DIAGNOSIS — Z95.0 PACEMAKER: Primary | ICD-10-CM
